# Patient Record
Sex: FEMALE | Race: BLACK OR AFRICAN AMERICAN | Employment: STUDENT | URBAN - METROPOLITAN AREA
[De-identification: names, ages, dates, MRNs, and addresses within clinical notes are randomized per-mention and may not be internally consistent; named-entity substitution may affect disease eponyms.]

---

## 2018-12-06 ENCOUNTER — MEDICAL CORRESPONDENCE (OUTPATIENT)
Dept: HEALTH INFORMATION MANAGEMENT | Facility: CLINIC | Age: 23
End: 2018-12-06

## 2018-12-10 ENCOUNTER — TRANSFERRED RECORDS (OUTPATIENT)
Dept: HEALTH INFORMATION MANAGEMENT | Facility: CLINIC | Age: 23
End: 2018-12-10

## 2018-12-18 ENCOUNTER — TELEPHONE (OUTPATIENT)
Dept: RHEUMATOLOGY | Facility: CLINIC | Age: 23
End: 2018-12-18

## 2019-01-03 NOTE — TELEPHONE ENCOUNTER
[facility] Call Center    Phone Message    May a detailed message be left on voicemail: yes    Reason for Call: Other: Milka [place], [phone] , Pts PCP is calling to follow up on a referral for the Pt, she has lupus and needs to be seen ASAP, Milka  states she has been waiting since 12/06 to get a response back, please yue the Primary care doctor at [phone], as well as the Pt to discuss      Action Taken: Message routed to:  [facility] ([facility]): Rheum

## 2019-01-07 NOTE — TELEPHONE ENCOUNTER
Spoke with patient who stated that she has not seen a Rheumatologist in the past and the only records needed are from [place] which have been received. Patient was offered an appointment on 2/15/2019 at 7:30 am with Dr. Hawkins and patient accepted. I let her know that I can place her on my wait list in case we get any cancellations. Patient stated that her schedule is limited due to being in school 3 days a week every other week.  Mirna Gregory CMA  1/7/2019 2:32 PM

## 2019-02-09 ENCOUNTER — NURSE TRIAGE (OUTPATIENT)
Dept: NURSING | Facility: CLINIC | Age: 24
End: 2019-02-09

## 2019-02-09 NOTE — TELEPHONE ENCOUNTER
Yvonne is taking medications that is causing bad side effects.  Today Yvonne has a rash on eye and was told due to lupus.  Yvonne is having confusion.  Today Yvonne is calling with dosing on prednisone.  FNA advised to phone pharmacist as no prescription for prednisone is listed in chart.

## 2019-02-14 ENCOUNTER — OFFICE VISIT (OUTPATIENT)
Dept: RHEUMATOLOGY | Facility: CLINIC | Age: 24
End: 2019-02-14
Attending: INTERNAL MEDICINE
Payer: COMMERCIAL

## 2019-02-14 VITALS
WEIGHT: 136.4 LBS | OXYGEN SATURATION: 96 % | SYSTOLIC BLOOD PRESSURE: 94 MMHG | HEIGHT: 66 IN | DIASTOLIC BLOOD PRESSURE: 66 MMHG | BODY MASS INDEX: 21.92 KG/M2 | HEART RATE: 99 BPM | TEMPERATURE: 98.2 F

## 2019-02-14 DIAGNOSIS — M32.19 SYSTEMIC LUPUS ERYTHEMATOSUS WITH OTHER ORGAN INVOLVEMENT, UNSPECIFIED SLE TYPE (H): ICD-10-CM

## 2019-02-14 DIAGNOSIS — M32.19 SYSTEMIC LUPUS ERYTHEMATOSUS WITH OTHER ORGAN INVOLVEMENT, UNSPECIFIED SLE TYPE (H): Primary | ICD-10-CM

## 2019-02-14 LAB
ALBUMIN SERPL-MCNC: 3.4 G/DL (ref 3.4–5)
ALP SERPL-CCNC: 71 U/L (ref 40–150)
ALT SERPL W P-5'-P-CCNC: 16 U/L (ref 0–50)
ANION GAP SERPL CALCULATED.3IONS-SCNC: 6 MMOL/L (ref 3–14)
AST SERPL W P-5'-P-CCNC: 20 U/L (ref 0–45)
BASOPHILS # BLD AUTO: 0 10E9/L (ref 0–0.2)
BASOPHILS NFR BLD AUTO: 0 %
BILIRUB SERPL-MCNC: 0.3 MG/DL (ref 0.2–1.3)
BUN SERPL-MCNC: 6 MG/DL (ref 7–30)
BURR CELLS BLD QL SMEAR: ABNORMAL
CALCIUM SERPL-MCNC: 8.3 MG/DL (ref 8.5–10.1)
CHLORIDE SERPL-SCNC: 104 MMOL/L (ref 94–109)
CO2 SERPL-SCNC: 28 MMOL/L (ref 20–32)
CREAT SERPL-MCNC: 0.42 MG/DL (ref 0.52–1.04)
DIFFERENTIAL METHOD BLD: ABNORMAL
EOSINOPHIL # BLD AUTO: 0 10E9/L (ref 0–0.7)
EOSINOPHIL NFR BLD AUTO: 0 %
ERYTHROCYTE [DISTWIDTH] IN BLOOD BY AUTOMATED COUNT: 14.6 % (ref 10–15)
GFR SERPL CREATININE-BSD FRML MDRD: >90 ML/MIN/{1.73_M2}
GLUCOSE SERPL-MCNC: 84 MG/DL (ref 70–99)
HCT VFR BLD AUTO: 36.2 % (ref 35–47)
HGB BLD-MCNC: 11.5 G/DL (ref 11.7–15.7)
IMM GRANULOCYTES # BLD: 0 10E9/L (ref 0–0.4)
IMM GRANULOCYTES NFR BLD: 0 %
LYMPHOCYTES # BLD AUTO: 0.6 10E9/L (ref 0.8–5.3)
LYMPHOCYTES NFR BLD AUTO: 28.7 %
MCH RBC QN AUTO: 27.9 PG (ref 26.5–33)
MCHC RBC AUTO-ENTMCNC: 31.8 G/DL (ref 31.5–36.5)
MCV RBC AUTO: 88 FL (ref 78–100)
MONOCYTES # BLD AUTO: 0.1 10E9/L (ref 0–1.3)
MONOCYTES NFR BLD AUTO: 6 %
NEUTROPHILS # BLD AUTO: 1.4 10E9/L (ref 1.6–8.3)
NEUTROPHILS NFR BLD AUTO: 65.3 %
NRBC # BLD AUTO: 0 10*3/UL
NRBC BLD AUTO-RTO: 0 /100
OVALOCYTES BLD QL SMEAR: SLIGHT
PLATELET # BLD AUTO: 127 10E9/L (ref 150–450)
PLATELET # BLD EST: ABNORMAL 10*3/UL
POIKILOCYTOSIS BLD QL SMEAR: ABNORMAL
POTASSIUM SERPL-SCNC: 2.8 MMOL/L (ref 3.4–5.3)
PROT SERPL-MCNC: 7.8 G/DL (ref 6.8–8.8)
RBC # BLD AUTO: 4.12 10E12/L (ref 3.8–5.2)
SODIUM SERPL-SCNC: 139 MMOL/L (ref 133–144)
WBC # BLD AUTO: 2.2 10E9/L (ref 4–11)

## 2019-02-14 PROCEDURE — 82306 VITAMIN D 25 HYDROXY: CPT | Performed by: INTERNAL MEDICINE

## 2019-02-14 PROCEDURE — 86147 CARDIOLIPIN ANTIBODY EA IG: CPT | Performed by: INTERNAL MEDICINE

## 2019-02-14 PROCEDURE — 86160 COMPLEMENT ANTIGEN: CPT | Performed by: INTERNAL MEDICINE

## 2019-02-14 PROCEDURE — 85613 RUSSELL VIPER VENOM DILUTED: CPT | Performed by: INTERNAL MEDICINE

## 2019-02-14 PROCEDURE — 82550 ASSAY OF CK (CPK): CPT | Performed by: INTERNAL MEDICINE

## 2019-02-14 PROCEDURE — 00000401 ZZHCL STATISTIC THROMBIN TIME NC: Performed by: INTERNAL MEDICINE

## 2019-02-14 PROCEDURE — 36415 COLL VENOUS BLD VENIPUNCTURE: CPT | Performed by: INTERNAL MEDICINE

## 2019-02-14 PROCEDURE — 87389 HIV-1 AG W/HIV-1&-2 AB AG IA: CPT | Performed by: INTERNAL MEDICINE

## 2019-02-14 PROCEDURE — G0463 HOSPITAL OUTPT CLINIC VISIT: HCPCS | Mod: ZF

## 2019-02-14 PROCEDURE — 87340 HEPATITIS B SURFACE AG IA: CPT | Performed by: INTERNAL MEDICINE

## 2019-02-14 PROCEDURE — 00000167 ZZHCL STATISTIC INR NC: Performed by: INTERNAL MEDICINE

## 2019-02-14 PROCEDURE — 86146 BETA-2 GLYCOPROTEIN ANTIBODY: CPT | Performed by: INTERNAL MEDICINE

## 2019-02-14 PROCEDURE — 85730 THROMBOPLASTIN TIME PARTIAL: CPT | Performed by: INTERNAL MEDICINE

## 2019-02-14 PROCEDURE — 85025 COMPLETE CBC W/AUTO DIFF WBC: CPT | Performed by: INTERNAL MEDICINE

## 2019-02-14 PROCEDURE — 86480 TB TEST CELL IMMUN MEASURE: CPT | Performed by: INTERNAL MEDICINE

## 2019-02-14 PROCEDURE — 86803 HEPATITIS C AB TEST: CPT | Performed by: INTERNAL MEDICINE

## 2019-02-14 PROCEDURE — 80053 COMPREHEN METABOLIC PANEL: CPT | Performed by: INTERNAL MEDICINE

## 2019-02-14 PROCEDURE — 82657 ENZYME CELL ACTIVITY: CPT | Performed by: INTERNAL MEDICINE

## 2019-02-14 RX ORDER — POTASSIUM CHLORIDE 1500 MG/1
40 TABLET, EXTENDED RELEASE ORAL DAILY
COMMUNITY
Start: 2019-01-26 | End: 2019-02-20

## 2019-02-14 RX ORDER — HYDROXYCHLOROQUINE SULFATE 200 MG/1
300 TABLET, FILM COATED ORAL DAILY
COMMUNITY
End: 2019-02-20

## 2019-02-14 RX ORDER — PREDNISONE 10 MG/1
10 TABLET ORAL 3 TIMES DAILY
COMMUNITY
End: 2019-02-20

## 2019-02-14 RX ORDER — LEVOTHYROXINE SODIUM 75 UG/1
75 TABLET ORAL DAILY
COMMUNITY
Start: 2019-01-26 | End: 2019-02-20

## 2019-02-14 ASSESSMENT — PAIN SCALES - GENERAL: PAINLEVEL: NO PAIN (0)

## 2019-02-14 ASSESSMENT — MIFFLIN-ST. JEOR: SCORE: 1380.46

## 2019-02-14 NOTE — PROGRESS NOTES
Rheumatology Clinic New Patient Visit - Attending    Reason for evaluation: SLE    SUBJECTIVE:  The patient is a 22 yo woman with a hx of Graves disease s/p radioactive iodine ablation in 2013, post-ablation hypothyroidism, who was recently diagnosed with SLE in 11/2018 in a primary care setting presents to establish rheumatology care. She initially presented with raynaud's, rash over face and legs, nasal and oral ulcers, weight loss of 30 lbs, and severe fatigue. Was found to have Leukopenia, thrombocytopenia (130), sed rate 45, SHANDA 1:320, Sm>8, SSA>8, RNP>8, DS DNA 17, low C3 C4. She was started on hydroxychloroquine 300/day, prednisone 30 mg/day, and subsequently - verapamil for Raynauds. About 2 weeks after beginning medications, she developed lapses in memory - could not remember whole days, or an e-mail she had sent. Found herself driving to [place] for no clear reason, until she ran out of gas. She became concerned that these symptoms were due to new medications and stopped taking them. She started feeling more lucid soon after.    Patient denies any fevers, chills, vision changes, seizures, focal weakness or numbness.  Denies any arthralgias, morning stiffness, myalgias,  Alopecia, vitiligo, ear fullness or drainage.   No cough or dyspnea, pleuricy, no abdominal pain, nausea, diarrhea, no hematuria, no history of blood clots.       Additional pertinent labs:    RF CCP neg  11/15 hypothyroid TSH 15  Marked hypokalemia  EBV IgG high, IgM neg Parvo B19 IgG high    Family hx negative for autoimmune disease, positive for diabetes and hypertension.      REVIEW OF SYMPTOMS:  A 10 point ROS was performed with pertinent findings listed above.      HISTORY REVIEW:  No past medical history on file.  No past surgical history on file.  No family history on file.  Social History     Socioeconomic History     Marital status: Single     Spouse name: Not on file     Number of children: Not on file     Years of education:  Not on file     Highest education level: Not on file   Social Needs     Financial resource strain: Not on file     Food insecurity - worry: Not on file     Food insecurity - inability: Not on file     Transportation needs - medical: Not on file     Transportation needs - non-medical: Not on file   Occupational History     Not on file   Tobacco Use     Smoking status: Never Smoker     Smokeless tobacco: Never Used   Substance and Sexual Activity     Alcohol use: Not on file     Drug use: Not on file     Sexual activity: Not on file   Other Topics Concern     Not on file   Social History Narrative     Not on file     There is no problem list on file for this patient.    Not on File    I reviewed the Current Medications:  Current Outpatient Medications   Medication Sig Dispense Refill     hydroxychloroquine (PLAQUENIL) 200 MG tablet Take 300 mg by mouth daily        levothyroxine (SYNTHROID/LEVOTHROID) 75 MCG tablet Take 75 mcg by mouth daily        potassium chloride ER (K-DUR/KLOR-CON M) 20 MEQ CR tablet Take 40 mEq by mouth daily        predniSONE (DELTASONE) 10 MG tablet Take 10 mg by mouth 3 times daily .           I reviewed the following labs:  Last CBC:  No results found for: WBC  No results found for: RBC  No results found for: HGB  No results found for: HCT  No components found for: MCT  No results found for: MCV  No results found for: MCH  No results found for: MCHC  No results found for: RDW  No results found for: PLT    Last Basic Metabolic Panel:  No results found for: NA   No results found for: POTASSIUM  No results found for: CHLORIDE  No results found for: LASHA  No results found for: CO2  No results found for: BUN  No results found for: CR  No results found for: GLC    No results found for: CKTOTAL  No results found for: TSH  No results found for: URIC  No results found for: CRP    Liver Function Studies - No results for input(s): PROTTOTAL, ALBUMIN, BILITOTAL, ALKPHOS, AST, ALT, BILIDIRECT in the last  "88171 hours.    UA RESULTS:  No results for input(s): COLOR, APPEARANCE, URINEGLC, URINEBILI, URINEKETONE, SG, UBLD, URINEPH, PROTEIN, UROBILINOGEN, NITRITE, LEUKEST, RBCU, WBCU in the last 76834 hours.    Autoimmunity labs:  No results found for: RHF  No results found for: CCPIGG    No results found for: ANCA  No results found for: [id number]  No results found for: V1QBIHJ  No results found for: VANNA  No results found for: DNA  No results found for: RNPIGG, SMIGG, SSAIGG, SSBIGG, SCLIGG        I personally reviewed and independently visualized the following images:  1/21/19  CT head without contrast.    COMPARISON:  None available     FINDINGS:  The ventricles are in proportion to the cortical sulci consistent with patient`s stated age. The gray-white matter junction is distinct. Negative for acute intracranial hemorrhage, extra-axial fluid collection or midline shift. The basal cisterns are intact.     There is mild mucosal thickening of the ethmoid sinuses. The remainder of the visualized paranasal sinuses are clear. The bony calvarium is intact.     IMPRESSION:  Unremarkable noncontrast head CT without acute intracranial abnormality.      Vitals: Blood pressure 94/66, pulse 99, temperature 98.2  F (36.8  C), temperature source Oral, height 1.676 m (5' 6\"), weight 61.9 kg (136 lb 6.4 oz), SpO2 96 %.      PHYSICAL EXAM  Constitutional: [id number] cooperative  Eyes: nl EOM, PERRLA, vision, conjunctiva, sclera  ENT: nl external ears, nose, hearing, lips, teeth, gums, throat  No mucous membrane lesions, normal saliva pool  Neck: no mass or thyroid enlargement  Resp: lungs clear to auscultation, nl to palpation  CV: RRR, no murmurs, rubs or gallops, no edema  GI: no ABD mass or tenderness  : not tested  Lymph: no cervical, supraclavicular, inguinal or epitrochlear nodes  MS: All TMJ, neck, shoulder, elbow, wrist, MCP/PIP/DIP, spine, hip, knee, ankle, and foot MTP/IP joints were examined and  found normal. No active " synovitis or deformity. Full ROM.  Normal  strength  Skin: + faint butterfly rash and discoid-appearing rash over the right eye. no nail pitting, alopecia, nodules or lesions  Neuro: nl cranial nerves, strength, sensation.   Psych: nl judgement, orientation, memory, affect.      ASSESSMENT and PLAN:  Yvonne has classic symptoms and serologif findings of SLE. Clinically her disease is very mild, however constitutional symptoms (weight loss) and markedly abnormal serologies (hypocomplementemia, thrombocytopenia) suggest a higher risk of severe SLE. It is not clear whether her memory lapses and impulsive behavior are related to SLE vs meds. She feels it's meds and feels better now after stopping. I suspect prednisone is the most likely culprit, hence will restart plaquenil, levothyroxine and potassium supplement, but not steroids for now.    We will get labwork today to assess the status of her cytopenias, hypokalemia, hypocomplementemia, and check antiphospholipid abs. If labs persistently abnormal, I would start azathioprine and consider adding a lower dose of prednisone temporarily.    We also discussed sunscreen, diet, and a dermatology referral for management of facial rashes.  I gave Yvonne a note for school; if neurologic symptoms recur, would consider MRI of the brain.    RTC: in 4 weeks.      I discussed the findings and recommendations with the patient.  I communicated the assessment and plan to the referring physician.  Time spent: 90 minutes, face time with patient: 45 minutes.      Yue Wiley MD, MSc  Rheumatology Attending Physician  [facility] [phone]

## 2019-02-14 NOTE — LETTER
To whom it may concern:    Yvonne Barron is under my care for systemic lupus erythematosus (SLE) that was diagnosed in 11/2018. This disease is highly unpredictable and can affect the brain. Common brain symptoms include extreme fatigue, forgetfulness, headaches and psychosis. These symptoms are particularly difficult to manage and predict early in the disease course. It is very brave of Yvonne to stay in school during this difficult time. I request that academic accomodations be made to support her in any way possible.     Please don't hesitate to contact me with any questions.    Sincerely,        Yue Wiley MD, MSc  Rheumatology Attending Physician  Pgr [phone]

## 2019-02-14 NOTE — PATIENT INSTRUCTIONS
- Restart hydroxychloroquine 1.5 tabs (300 mg) every day  - Restart levothyroxine 75 mg/day  - Labs today. Depending on lab results we will discuss starting an immunosuppressive medicine called azathioprine. Please read about it and ask any questions.  - We will also discuss continuing a potassium supplement and starting a vitamin D supplement if these tests come back low.

## 2019-02-14 NOTE — NURSING NOTE
"Chief Complaint   Patient presents with     Consult     positive SHANDA     BP 94/66   Pulse 99   Temp 98.2  F (36.8  C) (Oral)   Ht 1.676 m (5' 6\")   Wt 61.9 kg (136 lb 6.4 oz)   SpO2 96%   BMI 22.02 kg/m    Patti Miguel MA    "

## 2019-02-14 NOTE — LETTER
2/14/2019     RE: Yvonne Barron  [address]  [facility] [address]     Dear Colleague,    Thank you for referring your patient, Yvonne Barron, to the [facility] RHEUMATOLOGY at [facility]. Please see a copy of my visit note below.    Rheumatology Clinic New Patient Visit - Attending    Reason for evaluation: SLE    SUBJECTIVE:  The patient is a 22 yo woman with a hx of Graves disease s/p radioactive iodine ablation in 2013, post-ablation hypothyroidism, who was recently diagnosed with SLE in 11/2018 in a primary care setting presents to establish rheumatology care. She initially presented with raynaud's, rash over face and legs, nasal and oral ulcers, weight loss of 30 lbs, and severe fatigue. Was found to have Leukopenia, thrombocytopenia (130), sed rate 45, SHANDA 1:320, Sm>8, SSA>8, RNP>8, DS DNA 17, low C3 C4. She was started on hydroxychloroquine 300/day, prednisone 30 mg/day, and subsequently - verapamil for Raynauds. About 2 weeks after beginning medications, she developed lapses in memory - could not remember whole days, or an e-mail she had sent. Found herself driving to [place] for no clear reason, until she ran out of gas. She became concerned that these symptoms were due to new medications and stopped taking them. She started feeling more lucid soon after.    Patient denies any fevers, chills, vision changes, seizures, focal weakness or numbness.  Denies any arthralgias, morning stiffness, myalgias,  Alopecia, vitiligo, ear fullness or drainage.   No cough or dyspnea, pleuricy, no abdominal pain, nausea, diarrhea, no hematuria, no history of blood clots.       Additional pertinent labs:    RF CCP neg  11/15 hypothyroid TSH 15  Marked hypokalemia  EBV IgG high, IgM neg Parvo B19 IgG high    Family hx negative for autoimmune disease, positive for diabetes and hypertension.      REVIEW OF SYMPTOMS:  A 10 point ROS was performed with pertinent findings listed  above.      HISTORY REVIEW:  No past medical history on file.  No past surgical history on file.  No family history on file.  Social History     Socioeconomic History     Marital status: Single     Spouse name: Not on file     Number of children: Not on file     Years of education: Not on file     Highest education level: Not on file   Social Needs     Financial resource strain: Not on file     Food insecurity - worry: Not on file     Food insecurity - inability: Not on file     Transportation needs - medical: Not on file     Transportation needs - non-medical: Not on file   Occupational History     Not on file   Tobacco Use     Smoking status: Never Smoker     Smokeless tobacco: Never Used   Substance and Sexual Activity     Alcohol use: Not on file     Drug use: Not on file     Sexual activity: Not on file   Other Topics Concern     Not on file   Social History Narrative     Not on file     There is no problem list on file for this patient.    Not on File    I reviewed the Current Medications:  Current Outpatient Medications   Medication Sig Dispense Refill     hydroxychloroquine (PLAQUENIL) 200 MG tablet Take 300 mg by mouth daily        levothyroxine (SYNTHROID/LEVOTHROID) 75 MCG tablet Take 75 mcg by mouth daily        potassium chloride ER (K-DUR/KLOR-CON M) 20 MEQ CR tablet Take 40 mEq by mouth daily        predniSONE (DELTASONE) 10 MG tablet Take 10 mg by mouth 3 times daily .           I reviewed the following labs:  Last CBC:  No results found for: WBC  No results found for: RBC  No results found for: HGB  No results found for: HCT  No components found for: MCT  No results found for: MCV  No results found for: MCH  No results found for: MCHC  No results found for: RDW  No results found for: PLT    Last Basic Metabolic Panel:  No results found for: NA   No results found for: POTASSIUM  No results found for: CHLORIDE  No results found for: LASHA  No results found for: CO2  No results found for: BUN  No results  "found for: CR  No results found for: GLC    No results found for: CKTOTAL  No results found for: TSH  No results found for: URIC  No results found for: CRP    Liver Function Studies - No results for input(s): PROTTOTAL, ALBUMIN, BILITOTAL, ALKPHOS, AST, ALT, BILIDIRECT in the last 79168 hours.    UA RESULTS:  No results for input(s): COLOR, APPEARANCE, URINEGLC, URINEBILI, URINEKETONE, SG, UBLD, URINEPH, PROTEIN, UROBILINOGEN, NITRITE, LEUKEST, RBCU, WBCU in the last 32653 hours.    Autoimmunity labs:  No results found for: RHF  No results found for: CCPIGG    No results found for: ANCA  No results found for: [id number]  No results found for: G9KCALO  No results found for: VANNA  No results found for: DNA  No results found for: RNPIGG, SMIGG, SSAIGG, SSBIGG, SCLIGG        I personally reviewed and independently visualized the following images:  1/21/19  CT head without contrast.    COMPARISON:  None available     FINDINGS:  The ventricles are in proportion to the cortical sulci consistent with patient`s stated age. The gray-white matter junction is distinct. Negative for acute intracranial hemorrhage, extra-axial fluid collection or midline shift. The basal cisterns are intact.     There is mild mucosal thickening of the ethmoid sinuses. The remainder of the visualized paranasal sinuses are clear. The bony calvarium is intact.     IMPRESSION:  Unremarkable noncontrast head CT without acute intracranial abnormality.      Vitals: Blood pressure 94/66, pulse 99, temperature 98.2  F (36.8  C), temperature source Oral, height 1.676 m (5' 6\"), weight 61.9 kg (136 lb 6.4 oz), SpO2 96 %.      PHYSICAL EXAM  Constitutional: WD-WN-WG cooperative  Eyes: nl EOM, PERRLA, vision, conjunctiva, sclera  ENT: nl external ears, nose, hearing, lips, teeth, gums, throat  No mucous membrane lesions, normal saliva pool  Neck: no mass or thyroid enlargement  Resp: lungs clear to auscultation, nl to palpation  CV: RRR, no murmurs, rubs or " gallops, no edema  GI: no ABD mass or tenderness  : not tested  Lymph: no cervical, supraclavicular, inguinal or epitrochlear nodes  MS: All TMJ, neck, shoulder, elbow, wrist, MCP/PIP/DIP, spine, hip, knee, ankle, and foot MTP/IP joints were examined and  found normal. No active synovitis or deformity. Full ROM.  Normal  strength  Skin: + faint butterfly rash and discoid-appearing rash over the right eye. no nail pitting, alopecia, nodules or lesions  Neuro: nl cranial nerves, strength, sensation.   Psych: nl judgement, orientation, memory, affect.      ASSESSMENT and PLAN:  Yvonne has classic symptoms and serologif findings of SLE. Clinically her disease is very mild, however constitutional symptoms (weight loss) and markedly abnormal serologies (hypocomplementemia, thrombocytopenia) suggest a higher risk of severe SLE. It is not clear whether her memory lapses and impulsive behavior are related to SLE vs meds. She feels it's meds and feels better now after stopping. I suspect prednisone is the most likely culprit, hence will restart plaquenil, levothyroxine and potassium supplement, but not steroids for now.    We will get labwork today to assess the status of her cytopenias, hypokalemia, hypocomplementemia, and check antiphospholipid abs. If labs persistently abnormal, I would start azathioprine and consider adding a lower dose of prednisone temporarily.    We also discussed sunscreen, diet, and a dermatology referral for management of facial rashes.  I gave Yvonne a note for school; if neurologic symptoms recur, would consider MRI of the brain.    RTC: in 4 weeks.    I discussed the findings and recommendations with the patient.  I communicated the assessment and plan to the referring physician.  Time spent: 90 minutes, face time with patient: 45 minutes.      Yue Wiley MD, MSc  Rheumatology Attending Physician  pgr [phone]

## 2019-02-14 NOTE — LETTER
2/14/2019    RE: Yvonne SERRANO Anderson  [address]  [facility] [address]     Rheumatology Clinic New Patient Visit - Attending    Reason for evaluation: SLE    SUBJECTIVE:  The patient is a 24 yo woman with a hx of Graves disease s/p radioactive iodine ablation in 2013, post-ablation hypothyroidism, who was recently diagnosed with SLE in 11/2018 in a primary care setting presents to establish rheumatology care. She initially presented with raynaud's, rash over face and legs, nasal and oral ulcers, weight loss of 30 lbs, and severe fatigue. Was found to have Leukopenia, thrombocytopenia (130), sed rate 45, SHANDA 1:320, Sm>8, SSA>8, RNP>8, DS DNA 17, low C3 C4. She was started on hydroxychloroquine 300/day, prednisone 30 mg/day, and subsequently - verapamil for Raynauds. About 2 weeks after beginning medications, she developed lapses in memory - could not remember whole days, or an e-mail she had sent. Found herself driving to [place] for no clear reason, until she ran out of gas. She became concerned that these symptoms were due to new medications and stopped taking them. She started feeling more lucid soon after.    Patient denies any fevers, chills, vision changes, seizures, focal weakness or numbness.  Denies any arthralgias, morning stiffness, myalgias,  Alopecia, vitiligo, ear fullness or drainage.   No cough or dyspnea, pleuricy, no abdominal pain, nausea, diarrhea, no hematuria, no history of blood clots.       Additional pertinent labs:    RF CCP neg  11/15 hypothyroid TSH 15  Marked hypokalemia  EBV IgG high, IgM neg Parvo B19 IgG high    Family hx negative for autoimmune disease, positive for diabetes and hypertension.      REVIEW OF SYMPTOMS:  A 10 point ROS was performed with pertinent findings listed above.      HISTORY REVIEW:  No past medical history on file.  No past surgical history on file.  No family history on file.  Social History     Socioeconomic History     Marital status: Single     Spouse  name: Not on file     Number of children: Not on file     Years of education: Not on file     Highest education level: Not on file   Social Needs     Financial resource strain: Not on file     Food insecurity - worry: Not on file     Food insecurity - inability: Not on file     Transportation needs - medical: Not on file     Transportation needs - non-medical: Not on file   Occupational History     Not on file   Tobacco Use     Smoking status: Never Smoker     Smokeless tobacco: Never Used   Substance and Sexual Activity     Alcohol use: Not on file     Drug use: Not on file     Sexual activity: Not on file   Other Topics Concern     Not on file   Social History Narrative     Not on file     There is no problem list on file for this patient.    Not on File    I reviewed the Current Medications:  Current Outpatient Medications   Medication Sig Dispense Refill     hydroxychloroquine (PLAQUENIL) 200 MG tablet Take 300 mg by mouth daily        levothyroxine (SYNTHROID/LEVOTHROID) 75 MCG tablet Take 75 mcg by mouth daily        potassium chloride ER (K-DUR/KLOR-CON M) 20 MEQ CR tablet Take 40 mEq by mouth daily        predniSONE (DELTASONE) 10 MG tablet Take 10 mg by mouth 3 times daily .           I reviewed the following labs:  Last CBC:  No results found for: WBC  No results found for: RBC  No results found for: HGB  No results found for: HCT  No components found for: MCT  No results found for: MCV  No results found for: MCH  No results found for: MCHC  No results found for: RDW  No results found for: PLT    Last Basic Metabolic Panel:  No results found for: NA   No results found for: POTASSIUM  No results found for: CHLORIDE  No results found for: LASHA  No results found for: CO2  No results found for: BUN  No results found for: CR  No results found for: GLC    No results found for: CKTOTAL  No results found for: TSH  No results found for: URIC  No results found for: CRP    Liver Function Studies - No results for  "input(s): PROTTOTAL, ALBUMIN, BILITOTAL, ALKPHOS, AST, ALT, BILIDIRECT in the last 90886 hours.    UA RESULTS:  No results for input(s): COLOR, APPEARANCE, URINEGLC, URINEBILI, URINEKETONE, SG, UBLD, URINEPH, PROTEIN, UROBILINOGEN, NITRITE, LEUKEST, RBCU, WBCU in the last 99452 hours.    Autoimmunity labs:  No results found for: RHF  No results found for: CCPIGG    No results found for: ANCA  No results found for: X3ODOEA  No results found for: W9ZCAQD  No results found for: VANNA  No results found for: DNA  No results found for: RNPIGG, SMIGG, SSAIGG, SSBIGG, SCLIGG        I personally reviewed and independently visualized the following images:  1/21/19  CT head without contrast.    COMPARISON:  None available     FINDINGS:  The ventricles are in proportion to the cortical sulci consistent with patient`s stated age. The gray-white matter junction is distinct. Negative for acute intracranial hemorrhage, extra-axial fluid collection or midline shift. The basal cisterns are intact.     There is mild mucosal thickening of the ethmoid sinuses. The remainder of the visualized paranasal sinuses are clear. The bony calvarium is intact.     IMPRESSION:  Unremarkable noncontrast head CT without acute intracranial abnormality.      Vitals: Blood pressure 94/66, pulse 99, temperature 98.2  F (36.8  C), temperature source Oral, height 1.676 m (5' 6\"), weight 61.9 kg (136 lb 6.4 oz), SpO2 96 %.      PHYSICAL EXAM  Constitutional: WD-WN-WG cooperative  Eyes: nl EOM, PERRLA, vision, conjunctiva, sclera  ENT: nl external ears, nose, hearing, lips, teeth, gums, throat  No mucous membrane lesions, normal saliva pool  Neck: no mass or thyroid enlargement  Resp: lungs clear to auscultation, nl to palpation  CV: RRR, no murmurs, rubs or gallops, no edema  GI: no ABD mass or tenderness  : not tested  Lymph: no cervical, supraclavicular, inguinal or epitrochlear nodes  MS: All TMJ, neck, shoulder, elbow, wrist, MCP/PIP/DIP, spine, hip, " knee, ankle, and foot MTP/IP joints were examined and  found normal. No active synovitis or deformity. Full ROM.  Normal  strength  Skin: + faint butterfly rash and discoid-appearing rash over the right eye. no nail pitting, alopecia, nodules or lesions  Neuro: nl cranial nerves, strength, sensation.   Psych: nl judgement, orientation, memory, affect.      ASSESSMENT and PLAN:  Yvonne has classic symptoms and serologif findings of SLE. Clinically her disease is very mild, however constitutional symptoms (weight loss) and markedly abnormal serologies (hypocomplementemia, thrombocytopenia) suggest a higher risk of severe SLE. It is not clear whether her memory lapses and impulsive behavior are related to SLE vs meds. She feels it's meds and feels better now after stopping. I suspect prednisone is the most likely culprit, hence will restart plaquenil, levothyroxine and potassium supplement, but not steroids for now.    We will get labwork today to assess the status of her cytopenias, hypokalemia, hypocomplementemia, and check antiphospholipid abs. If labs persistently abnormal, I would start azathioprine and consider adding a lower dose of prednisone temporarily.    We also discussed sunscreen, diet, and a dermatology referral for management of facial rashes.  I gave Yvonne a note for school; if neurologic symptoms recur, would consider MRI of the brain.    RTC: in 4 weeks.      I discussed the findings and recommendations with the patient.  I communicated the assessment and plan to the referring physician.  Time spent: 90 minutes, face time with patient: 45 minutes.      Yue Wiley MD, MSc  Rheumatology Attending Physician  pgr [phone]

## 2019-02-15 ENCOUNTER — TELEPHONE (OUTPATIENT)
Dept: RHEUMATOLOGY | Facility: CLINIC | Age: 24
End: 2019-02-15

## 2019-02-15 DIAGNOSIS — M32.9 SYSTEMIC LUPUS ERYTHEMATOSUS, UNSPECIFIED SLE TYPE, UNSPECIFIED ORGAN INVOLVEMENT STATUS (H): Primary | ICD-10-CM

## 2019-02-15 DIAGNOSIS — M32.19 SYSTEMIC LUPUS ERYTHEMATOSUS WITH OTHER ORGAN INVOLVEMENT, UNSPECIFIED SLE TYPE (H): ICD-10-CM

## 2019-02-15 LAB
B2 GLYCOPROT1 IGG SERPL IA-ACNC: 1.4 U/ML
B2 GLYCOPROT1 IGM SERPL IA-ACNC: <0.9 U/ML
C3 SERPL-MCNC: 29 MG/DL (ref 76–169)
C4 SERPL-MCNC: 4 MG/DL (ref 15–50)
CARDIOLIPIN ANTIBODY IGG: <1.6 GPL-U/ML (ref 0–19.9)
CARDIOLIPIN ANTIBODY IGM: 0.2 MPL-U/ML (ref 0–19.9)
CK SERPL-CCNC: 30 U/L (ref 30–225)
DEPRECATED CALCIDIOL+CALCIFEROL SERPL-MC: 22 UG/L (ref 20–75)
HBV SURFACE AG SERPL QL IA: NONREACTIVE
HCV AB SERPL QL IA: NONREACTIVE
HIV 1+2 AB+HIV1 P24 AG SERPL QL IA: NONREACTIVE

## 2019-02-15 NOTE — TELEPHONE ENCOUNTER
Called and spoke with pt, she will resume potassium at last prescribed dose, will connect with PCP for further refills.    Manisha Michael RN  Rheumatology Clinic

## 2019-02-15 NOTE — TELEPHONE ENCOUNTER
Called and spoke with pt, she will see me on 2/28 at 1 pm in clinic for a follow up nurse visit.  She will have labs the same day.  She has requested medication go to [facility].      Discussed Azathioprine, will start with 50 mg or 1 tab daily x 7 days, then will increase to 50 mg BID, 1 tab twice a day.  If she has any side effects she will call me, such as diarrhea, or n/v.    She will restart prednisone at 20 mg daily in the am.  She will call if there is any confusion with taking.      Pt had all questions answered.    Manisha Michael RN  Rheumatology Clinic

## 2019-02-15 NOTE — TELEPHONE ENCOUNTER
Social Work Intervention  [facility]    Data/Intervention:    Patient Name:  Yvonne Barron  /Age:  1995 (23 year old)    Visit Type: telephone  Referral Source: [facility] Michael in Rheumatology Clinic   Reason for Referral:  Insurance questions     Collaborated With:    - Patient     Patient Concerns/Issues:   Recieved request from GI[facility]PPE Michael to meet with pt as she had insurance questions. Attempted to meet with pt in clinic, but pt had already left by the time SW got to lobby. Called pt to follow up; introduced self and role of SW, reason for call. Pt had general questions about how her insurance worked. Pt reported that she has a student BCBS plan and also was recently approved for MA. Could not locate pt in [facility] as having MA, but encouraged pt to call billing office with updated insurance info once she has it. Also explained that if she ever gets a bill and has questions or thinks it's incorrect, she can always call the billing number located on the bill to follow-up. Pt expressed understanding and thanked SW for the call. Provided SW contact info should other needs arise.    Intervention/Education/Resources Provided:  - Education on insurance    Assessment/Plan:  Pt was pleasant and receptive to SW. No SW needs identified, pt more so interested in learning how her insurance works.     Provided patient/family with contact information and availability.    MOISES Bernardo, [facility]  Clinical   [facility]   Ph. [phone]  [email or web address]     NO LETTER

## 2019-02-15 NOTE — TELEPHONE ENCOUNTER
----- Message from Yue Wiley MD sent at 2/14/2019  7:53 PM CST -----  Please let Yvonne know to resume potassium, hers is still very low.

## 2019-02-15 NOTE — TELEPHONE ENCOUNTER
----- Message from Yue Wiley MD sent at 2/15/2019 11:02 AM CST -----  Hey,    Not sure if you were able to meet Yvonne yesterday, but she needs close vigilant care. I asked her to set up mychart yesterday and she did not.    Her labs are now back, complement levels very low. I want her to:   - resume her potassium supplement  - resume prednisone at a lower dose (20 mg/day, which hopefully won't cause her confusion)  - start azathioprine 50 mg daily for 1 week, then 50 mg BID.  - return for nurse visit with med review and azathioprine safety labs in 2 weeks (CBC, ALT, AST)

## 2019-02-17 LAB
GAMMA INTERFERON BACKGROUND BLD IA-ACNC: 0.07 IU/ML
M TB IFN-G BLD-IMP: NEGATIVE
M TB IFN-G CD4+ BCKGRND COR BLD-ACNC: 2.65 IU/ML
MITOGEN IGNF BCKGRD COR BLD-ACNC: 0 IU/ML
MITOGEN IGNF BCKGRD COR BLD-ACNC: 0.01 IU/ML

## 2019-02-18 LAB
LA PPP-IMP: NEGATIVE
TPMT BLD-CCNC: 25.1 U/ML (ref 24–44)

## 2019-02-20 ENCOUNTER — HOSPITAL ENCOUNTER (EMERGENCY)
Facility: CLINIC | Age: 24
Discharge: HOME | End: 2019-02-20
Attending: EMERGENCY MEDICINE | Admitting: EMERGENCY MEDICINE
Payer: COMMERCIAL

## 2019-02-20 VITALS
OXYGEN SATURATION: 100 % | SYSTOLIC BLOOD PRESSURE: 104 MMHG | TEMPERATURE: 100.1 F | RESPIRATION RATE: 18 BRPM | WEIGHT: 131 LBS | DIASTOLIC BLOOD PRESSURE: 57 MMHG | HEART RATE: 109 BPM | BODY MASS INDEX: 21.14 KG/M2

## 2019-02-20 DIAGNOSIS — L93.0 LUPUS ERYTHEMATOSUS, UNSPECIFIED FORM: ICD-10-CM

## 2019-02-20 PROCEDURE — 99283 EMERGENCY DEPT VISIT LOW MDM: CPT | Performed by: EMERGENCY MEDICINE

## 2019-02-20 PROCEDURE — 99284 EMERGENCY DEPT VISIT MOD MDM: CPT | Mod: Z6 | Performed by: EMERGENCY MEDICINE

## 2019-02-20 RX ORDER — AZATHIOPRINE 50 MG/1
TABLET ORAL
Qty: 30 TABLET | Refills: 0 | Status: SHIPPED | OUTPATIENT
Start: 2019-02-20 | End: 2020-01-23

## 2019-02-20 RX ORDER — PREDNISONE 20 MG/1
TABLET ORAL
Qty: 30 TABLET | Refills: 1 | Status: SHIPPED | OUTPATIENT
Start: 2019-02-20 | End: 2019-02-27

## 2019-02-20 RX ORDER — POTASSIUM CHLORIDE 1500 MG/1
20 TABLET, EXTENDED RELEASE ORAL DAILY
Qty: 10 TABLET | Refills: 0 | Status: SHIPPED | OUTPATIENT
Start: 2019-02-20 | End: 2019-03-02

## 2019-02-20 RX ORDER — HYDROXYCHLOROQUINE SULFATE 200 MG/1
TABLET, FILM COATED ORAL
Qty: 30 TABLET | Refills: 1 | Status: SHIPPED | OUTPATIENT
Start: 2019-02-20 | End: 2020-01-23

## 2019-02-20 RX ORDER — LEVOTHYROXINE SODIUM 100 UG/1
100 TABLET ORAL DAILY
Qty: 30 TABLET | Refills: 1 | Status: SHIPPED | OUTPATIENT
Start: 2019-02-20 | End: 2020-01-23

## 2019-02-20 SDOH — HEALTH STABILITY: MENTAL HEALTH: HOW OFTEN DO YOU HAVE A DRINK CONTAINING ALCOHOL?: NEVER

## 2019-02-20 NOTE — ED AVS SNAPSHOT
[facility], Emergency Department  [address]  [place] [address]  Phone:  [phone]  Fax:  [phone]                                    Yvonne Barron   MRN: [id number]    Department:  [facility], Emergency Department   Date of Visit:  2/20/2019           After Visit Summary Signature Page    I have received my discharge instructions, and my questions have been answered. I have discussed any challenges I see with this plan with the nurse or doctor.    ..........................................................................................................................................  Patient/Patient Representative Signature      ..........................................................................................................................................  Patient Representative Print Name and Relationship to Patient    ..................................................               ................................................  Date                                   Time    ..........................................................................................................................................  Reviewed by Signature/Title    ...................................................              ..............................................  Date                                               Time          22EPIC Rev 08/18

## 2019-02-21 DIAGNOSIS — M32.9 SYSTEMIC LUPUS ERYTHEMATOSUS, UNSPECIFIED SLE TYPE, UNSPECIFIED ORGAN INVOLVEMENT STATUS (H): Primary | ICD-10-CM

## 2019-02-21 RX ORDER — AZATHIOPRINE 50 MG/1
50 TABLET ORAL 2 TIMES DAILY
Qty: 180 TABLET | Refills: 0 | Status: SHIPPED | OUTPATIENT
Start: 2019-02-22 | End: 2020-01-23

## 2019-02-21 RX ORDER — PREDNISONE 20 MG/1
20 TABLET ORAL DAILY
Qty: 60 TABLET | Refills: 0 | Status: SHIPPED | OUTPATIENT
Start: 2019-02-21 | End: 2020-01-23

## 2019-02-21 RX ORDER — PREDNISONE 10 MG/1
20 TABLET ORAL DAILY
Qty: 30 TABLET | Refills: 0 | Status: SHIPPED | OUTPATIENT
Start: 2019-02-21 | End: 2020-01-23

## 2019-02-21 RX ORDER — AZATHIOPRINE 50 MG/1
50 TABLET ORAL DAILY
Qty: 7 TABLET | Refills: 0 | Status: SHIPPED | OUTPATIENT
Start: 2019-02-21 | End: 2020-01-23

## 2019-02-22 NOTE — ED PROVIDER NOTES
History     Chief Complaint   Patient presents with     Dysphagia     Difficutly swallowing since awakening      Facial Swelling     Facial swelling noted upon arrival to ED, no shortness of breath or known allergies, pt reporst history of Lupus      Hemorrhoids     Pt reports 4 hemorrhoids that have bleed with wiping, since arrival to the ED increased bleeding     HPI  Yvonne Barron is a 23 year old female with recently diagnosed SLE who presents with a rash and facial swelling, dysphagia and sore throat and rectal bleeding.  She has noticed some blood on her tissue paper and some bumps around her anus.  She has had no vomiting or hematemesis.  She has not been taking any of her SLE meds for over 2 weeks.  She has no chest pain or shortness of breath.  She states that her current facial rash and swelling are consistent with her lupus.  No headaches or fevers.       PAST MEDICAL HISTORY:   Past Medical History:   Diagnosis Date     Hypothyroidism      Lupus (systemic lupus erythematosus) (H)        PAST SURGICAL HISTORY: History reviewed. No pertinent surgical history.    FAMILY HISTORY: History reviewed. No pertinent family history.    SOCIAL HISTORY:   Social History     Tobacco Use     Smoking status: Never Smoker     Smokeless tobacco: Never Used   Substance Use Topics     Alcohol use: No     Frequency: Never       Discharge Medication List as of 2/20/2019  6:15 PM      START taking these medications    Details   azaTHIOprine (IMURAN) 50 MG tablet Take 1 tablet po Qday for 7 days then take 1 pill PO BID, Disp-30 tablet, R-0, Local Print      potassium chloride ER (K-TAB) 20 MEQ CR tablet Take 1 tablet (20 mEq) by mouth daily for 10 days, Disp-10 tablet, R-0, Local Print         CONTINUE these medications which have CHANGED    Details   hydroxychloroquine (PLAQUENIL) 200 MG tablet Take 300mg PO Qday, Disp-30 tablet, R-1, Local Print      levothyroxine (SYNTHROID/LEVOTHROID) 100 MCG tablet Take 1 tablet (100  mcg) by mouth daily, Disp-30 tablet, R-1, Local Print      predniSONE (DELTASONE) 20 MG tablet Take one tablet PO daily., Disp-30 tablet, R-1, Local PrintPlease include the quantity of tablets and (strength) per dose in sig.           STOP taking these medications       potassium chloride ER (K-DUR/KLOR-CON M) 20 MEQ CR tablet Comments:   Reason for Stopping:                Not on File    I have reviewed the Medications, Allergies, Past Medical and Surgical History, and Social History in the Epic system.    Review of Systems   All other systems reviewed and are negative.      Physical Exam   BP: 104/57  Pulse: 109  Temp: 100.1  F (37.8  C)  Resp: 18  Weight: 59.4 kg (131 lb)  SpO2: 100 %      Physical Exam   Constitutional: She is oriented to person, place, and time. No distress.   HENT:   Head: Normocephalic and atraumatic.   Eyes: Conjunctivae are normal. Pupils are equal, round, and reactive to light.   Neck: Normal range of motion. Neck supple.   Cardiovascular: Normal rate and intact distal pulses.   Pulmonary/Chest: Effort normal. No respiratory distress. She has no wheezes. She has no rales.   Abdominal: Soft. She exhibits no distension. There is no tenderness. There is no rebound and no guarding.   Musculoskeletal: Normal range of motion.   Neurological: She is alert and oriented to person, place, and time.   Skin: Skin is warm and dry. She is not diaphoretic.   Discoid lesions with scaling and swelling to forehead and face,  Perianal shallow ulcerations, no discharge, no bleeding   Psychiatric: She has a normal mood and affect. Her behavior is normal. Thought content normal.   Nursing note and vitals reviewed.      ED Course        Procedures             Critical Care time:  none             Labs Ordered and Resulted from Time of ED Arrival Up to the Time of Departure from the ED - No data to display         Assessments & Plan (with Medical Decision Making)   1.  Lupus flare    22 yo F with lupus who  presents with a lupus flare.  She is having dermatologic manifestations including a discoid facial rash and perianal ulcerations. She feels well and declines labs.  She will follow up with her PCP later this week.  Her medications were refilled including Imuran, Plaquenil, prednisone and Potassium and she will follow up with her PCP next week.       I have reviewed the nursing notes.    I have reviewed the findings, diagnosis, plan and need for follow up with the patient.       Medication List      Started    azaTHIOprine 50 MG tablet  Commonly known as:  IMURAN  Take 1 tablet po Qday for 7 days then take 1 pill PO BID     potassium chloride ER 20 MEQ CR tablet  Commonly known as:  K-TAB  20 mEq, Oral, DAILY        Modified    hydroxychloroquine 200 MG tablet  Commonly known as:  PLAQUENIL  Take 300mg PO Qday  What changed:      how much to take    how to take this    when to take this    additional instructions     levothyroxine 100 MCG tablet  Commonly known as:  SYNTHROID/LEVOTHROID  100 mcg, Oral, DAILY  What changed:      medication strength    how much to take     predniSONE 20 MG tablet  Commonly known as:  DELTASONE  Take one tablet PO daily  What changed:      medication strength    how much to take    how to take this    when to take this    additional instructions        Discontinued    potassium chloride ER 20 MEQ CR tablet  Commonly known as:  K-DUR/KLOR-CON M            Final diagnoses:   Lupus erythematosus, unspecified form       2/20/2019   [facility], [place], EMERGENCY DEPARTMENT     Keegan Brady MD  02/22/19 [id number]

## 2019-02-24 ENCOUNTER — HOSPITAL ENCOUNTER (EMERGENCY)
Facility: CLINIC | Age: 24
Discharge: HOME | End: 2019-02-24
Attending: EMERGENCY MEDICINE | Admitting: EMERGENCY MEDICINE
Payer: COMMERCIAL

## 2019-02-24 VITALS
SYSTOLIC BLOOD PRESSURE: 100 MMHG | DIASTOLIC BLOOD PRESSURE: 61 MMHG | OXYGEN SATURATION: 98 % | RESPIRATION RATE: 16 BRPM | TEMPERATURE: 97.5 F | HEART RATE: 83 BPM

## 2019-02-24 DIAGNOSIS — F41.1 ANXIETY REACTION: ICD-10-CM

## 2019-02-24 PROCEDURE — 99282 EMERGENCY DEPT VISIT SF MDM: CPT

## 2019-02-24 ASSESSMENT — ENCOUNTER SYMPTOMS
ARTHRALGIAS: 1
SHORTNESS OF BREATH: 1
NERVOUS/ANXIOUS: 1
NAUSEA: 1

## 2019-02-24 NOTE — ED PROVIDER NOTES
History     Chief Complaint:  Shortness of breath     HPI   Yvonne Barron is a 23 year old female with a history of lupus and anxiety who presents via EMS with shortness of breath.  The patient reports she became upset when her [employer]  brought her and her cousin to the wrong location.  She started to feel very short of breath and nauseous and then began to panic, similar to panic attacks she has had in the past.  The [employer]  ended up kicking them out of the car and they were out in the cold for approximately and hour before EMS arrived.  By the time of arrival in the ED, she is already feeling calm and feels her symptoms before were from anxiety.  She is currently dealing with a lupus flare which causes increased joint pain.  Her current Prednisone dose is 20 mg daily.  Her only other complaint is that her feet are cold.    Allergies:  No known drug allergies.     Medications:    Azathioprine  Prednisone  Hydroxychloroquine  Levothyroxine   Potassium chloride    Past Medical History:    Hypothyroidism  Systemic lupus erythematosus   Anxiety     Past Surgical History:    History reviewed. No pertinent past surgical history.     Family History:    History reviewed. No pertinent family history.     Social History:  Presents to the ED with her cousin  Tobacco Use: No previous or current tobacco use.   Alcohol Use: No alcohol use.   PCP: [facility]     Review of Systems   Respiratory: Positive for shortness of breath.    Gastrointestinal: Positive for nausea.   Musculoskeletal: Positive for arthralgias.        Positive for coldness of her feet.   Psychiatric/Behavioral: The patient is nervous/anxious.    All other systems reviewed and are negative.    Physical Exam   First Vitals:  BP: (!) 89/59  Pulse: 82  Heart Rate: 82  Temp: 97.5  F (36.4  C)  Resp: 16  SpO2: 98 %    Physical Exam  Nursing note and vitals reviewed.    Constitutional:  Appears well-developed and well-nourished, comfortable.     HENT:    Nose normal.  No discharge.      Oropharynx is clear and moist.  Eyes:    Conjunctivae are normal without injection. No lid droop.     Pupils are equal, round, and reactive to light.   Cardiovascular:  Normal rate, regular rhythm with normal S1 and S2.      Normal heart sounds and peripheral pulses 2+ and equal.       No murmur or mariel.  Pulmonary:  Effort normal and breath sounds clear to auscultation bilaterally   No respiratory distress.  No stridor.     No wheezes. No rales.     Neurological:   Alert and oriented.      Exhibits good muscle tone. Coordination normal.      GCS eye subscore is 4. GCS verbal subscore is 5.      GCS motor subscore is 6.   Skin:    Skin is warm and dry. No rash noted. No diaphoresis.      No erythema. No pallor.  No lesions.  Psychiatric:   Behavior is normal. Flat affect.     Judgment and thought content normal.      Emergency Department Course     Emergency Department Course:  The patient arrived in the emergency department via EMS.   Nursing notes and vitals reviewed.  I performed an exam of the patient as documented above.     Repeat BP: 93/67    Findings and plan explained to the patient. Patient discharged home with instructions regarding supportive care, medications, and reasons to return. The importance of close follow-up was reviewed.     Impression & Plan      Medical Decision Making:  Patient comes in after having what she thinks was a panic attack.  It is completely resolved.  She was upset over some things that happened today.  She has had these before.  She runs a low blood pressure around 100 normally.  The initial one was 89 and now is 97.  She was quite cold when she came in.  She was warmed up and wanted to go home at this point.  I feels reasonable and she can follow-up in the clinic this week if she has further anxiety issues.    Diagnosis:    ICD-10-CM    1. Anxiety reaction F41.1      Disposition:  Discharged to home.     Patient instructions:  Stay  warm today, push fluids and rest.  Follow-up in the clinic this week with your doctor if you have continued anxiety.  Also regarding your lupus flare, follow-up with your rheumatologist    Discharge Medications:  None      I, Isabel Arceo, am serving as a scribe on 2/24/2019 at 12:48 PM to personally document services performed by Dr. Mirza based on my observations and the provider's statements to me.    Isabel Arceo  2/24/2019    EMERGENCY DEPARTMENT       Jennifer Mirza MD  02/25/19 0859

## 2019-02-24 NOTE — ED AVS SNAPSHOT
Emergency Department  [address] [place] [address]  Phone:  [phone]  Fax:  [phone]                                    Yvonne Barron   MRN: [id number]    Department:   Emergency Department   Date of Visit:  2/24/2019           After Visit Summary Signature Page    I have received my discharge instructions, and my questions have been answered. I have discussed any challenges I see with this plan with the nurse or doctor.    ..........................................................................................................................................  Patient/Patient Representative Signature      ..........................................................................................................................................  Patient Representative Print Name and Relationship to Patient    ..................................................               ................................................  Date                                   Time    ..........................................................................................................................................  Reviewed by Signature/Title    ...................................................              ..............................................  Date                                               Time          22EPIC Rev 08/18

## 2019-02-24 NOTE — DISCHARGE INSTRUCTIONS
Stay warm today, push fluids and rest.  Follow-up in the clinic this week with your doctor if you have continued anxiety.  Also regarding your lupus flare, follow-up with your rheumatologist.

## 2019-02-24 NOTE — ED NOTES
Bed: ED22  Expected date: 2/24/19  Expected time: 12:03 PM  Means of arrival: Ambulance  Comments:  [age]f SOB, anxiety ETA 1209

## 2019-02-28 ENCOUNTER — TELEPHONE (OUTPATIENT)
Dept: RHEUMATOLOGY | Facility: CLINIC | Age: 24
End: 2019-02-28

## 2019-02-28 NOTE — TELEPHONE ENCOUNTER
Form Request Documentation    Name of Form: [facility] Request for Medical Opinion    Date Received: 2/26/2019    Mode Received: patient dropped off     Provider: Saurabh    Date Needed By: Patient stated that she would like to  the form. I explained to patient that Dr. Wiley is not in clinic until 2/28/2019 to review the form. Patient verbalized understanding.    Date Given to Provider: Provider in clinic 2/28/2019.     Mirna Gregory CMA  2/28/2019 9:39 AM

## 2019-02-28 NOTE — TELEPHONE ENCOUNTER
Dr. Wiley reviewed the form and wanted to know why the patient needed this filled out. I called the patient who stated that she lost her job due to her lupus. She is attempting to apply for food stamps, but, is in need of Dr. Wiley to attest to her symptoms of lupus. I let patient know that I will pass this information on to Dr. Wiley.   Mirna Gregory CMA  2/28/2019 2:10 PM

## 2019-03-01 NOTE — TELEPHONE ENCOUNTER
Form Request Documentation    Provider Agreed to complete form? yes    Date Returned to support Staff: 3/1/2019     Date patient notified: 3/1/2019     Disposition of Form: Patient will  the form at 12:00 pm today, 3/1/2019.    Mirna Gregory CMA  3/1/2019 8:53 AM

## 2019-03-09 ENCOUNTER — HEALTH MAINTENANCE LETTER (OUTPATIENT)
Age: 24
End: 2019-03-09

## 2019-03-27 ENCOUNTER — TELEPHONE (OUTPATIENT)
Dept: RHEUMATOLOGY | Facility: CLINIC | Age: 24
End: 2019-03-27

## 2019-03-27 NOTE — TELEPHONE ENCOUNTER
[facility] Call Center    Phone Message    May a detailed message be left on voicemail: yes    Reason for Call: Medication Question or concern regarding medication   Prescription Clarification  Name of Medication: prednisone  Prescribing Provider: Dr Yue Wiley   Pharmacy: [facility]   What on the order needs clarification? Pt has been on medication since ~December and reports some bloating. She was never told if/when she should discontinue use. Please reach out to pt to discuss          Action Taken: Message routed to:  [facility] ([facility]): rheum

## 2019-03-29 NOTE — TELEPHONE ENCOUNTER
"Pt is now in [place], she had to move home as it was too hard to live with out support. She now has out of state insurance and is having a hard time getting in to see a provider locally in [place].      Pt did have a fever last night, did improve after taking tylenol.  Otherwise feeling fine just bloated. Pt no longer has imuran, she ran out of medication.     She is taking prednisone at 20 mg a day, is taking HCQ 1.5 tablets daily and synthroid.  She is feeling very cushingoid, cheeks are very \"deformed\"  Per pt.     Pt is working on getting insurance in [place], but currently has no where to get labs and is not able to see a provider.    She is wondering if she can taper prednisone.  She would really like to get off it if possible.    Manisha Michael RN  Rheumatology Clinic    "

## 2019-04-01 NOTE — TELEPHONE ENCOUNTER
Left message requesting that pt return call to clinic.    Manisha Michael RN  Rheumatology Clinic

## 2019-04-01 NOTE — TELEPHONE ENCOUNTER
Discussed with Dr. Diaz.  She is willing to start decreasing pt's prednisone, will start with decreasing to 15 mg a day, but would like to get pt back on Azathioprine.  Will need to make sure that pt is ok to get labs 2-3 weeks after starting Azathioprine.      Manisha Michael RN  Rheumatology Clinic

## 2019-04-02 NOTE — TELEPHONE ENCOUNTER
Pt was admitted to [facility] in [place], over the weekend.  Pt went to ER on Friday as she was running a fever of 104, Was diagnosed with Sepsis, doctor would like her to wean off prednisone, she is currently on 30 mg a day, and he would like to take her off plaquenil, as he says it is an immunosuppressant and with her sepsis she should not be on it.  Have requested that she ask Dr. Anton call us so that we may discuss with him.  Have discussed with Dr. Wiley, she is more than happy to talk with him, I provided my number to pt, so she can ask him to call and I will connect him with Dr. Wiley.      Manisha Michael RN  Rheumatology Clinic

## 2019-04-06 NOTE — TELEPHONE ENCOUNTER
Receieved call from Dr. Rolando Peterson, from [facility] requesting a return call from Dr. Wiley to discuss pt.  Message was left after business hours at 1740 on Friday 4/5/19.  Phone number to call: [phone].    Sent to provider.      Manisha Michael RN  Rheumatology Clinic

## 2019-04-16 NOTE — TELEPHONE ENCOUNTER
Called and spoke with pt, she was readmitted to the hospital due to low WBC and remains there now.  Pt is getting a bone marrow biopsy.  Face is swollen.  Pt has been admitted to [facility] where they have Rheumatology care.  Have let her know that if Rheumatology has any questions for Dr Wiley they can certainly call here for any information.    Let pt know that since she is now under the care of rheumatology in [place], we will stop following with her.  She is thankful for care and treatment.    Manisha Michael RN  Rheumatology Clinic

## 2020-01-22 ENCOUNTER — TELEPHONE (OUTPATIENT)
Dept: RHEUMATOLOGY | Facility: CLINIC | Age: 25
End: 2020-01-22

## 2020-01-22 NOTE — TELEPHONE ENCOUNTER
Left message for pt reminding them of upcoming appointment.  Instructed pt to bring updated medications list.  Mari Pina, CMA

## 2020-01-23 ENCOUNTER — OFFICE VISIT (OUTPATIENT)
Dept: RHEUMATOLOGY | Facility: CLINIC | Age: 25
End: 2020-01-23
Attending: INTERNAL MEDICINE
Payer: COMMERCIAL

## 2020-01-23 VITALS
OXYGEN SATURATION: 100 % | BODY MASS INDEX: 19.69 KG/M2 | HEART RATE: 99 BPM | SYSTOLIC BLOOD PRESSURE: 99 MMHG | WEIGHT: 122 LBS | DIASTOLIC BLOOD PRESSURE: 64 MMHG

## 2020-01-23 DIAGNOSIS — R63.4 WEIGHT LOSS: ICD-10-CM

## 2020-01-23 DIAGNOSIS — E55.9 VITAMIN D DEFICIENCY: ICD-10-CM

## 2020-01-23 DIAGNOSIS — T69.1XXA CHILBLAIN LUPUS ERYTHEMATOSUS, INITIAL ENCOUNTER: ICD-10-CM

## 2020-01-23 DIAGNOSIS — I73.01 RAYNAUD'S PHENOMENON WITH GANGRENE (H): ICD-10-CM

## 2020-01-23 DIAGNOSIS — M32.9 SYSTEMIC LUPUS ERYTHEMATOSUS, UNSPECIFIED SLE TYPE, UNSPECIFIED ORGAN INVOLVEMENT STATUS (H): Primary | ICD-10-CM

## 2020-01-23 PROCEDURE — G0463 HOSPITAL OUTPT CLINIC VISIT: HCPCS | Mod: ZF

## 2020-01-23 RX ORDER — MYCOPHENOLATE MOFETIL 500 MG/1
1000 TABLET ORAL
COMMUNITY
Start: 2020-01-16 | End: 2020-01-23

## 2020-01-23 RX ORDER — AMLODIPINE BESYLATE 2.5 MG/1
2.5 TABLET ORAL
COMMUNITY
Start: 2019-12-06 | End: 2020-01-23

## 2020-01-23 RX ORDER — CLOBETASOL PROPIONATE 0.5 MG/G
OINTMENT TOPICAL 2 TIMES DAILY
Qty: 30 G | Refills: 0 | Status: SHIPPED | OUTPATIENT
Start: 2020-01-23 | End: 2020-06-25

## 2020-01-23 RX ORDER — HYDROXYCHLOROQUINE SULFATE 200 MG/1
200 TABLET, FILM COATED ORAL 2 TIMES DAILY
Qty: 60 TABLET | Refills: 3 | Status: SHIPPED | OUTPATIENT
Start: 2020-01-23 | End: 2020-06-12

## 2020-01-23 RX ORDER — MYCOPHENOLATE MOFETIL 500 MG/1
1500 TABLET ORAL 2 TIMES DAILY
Qty: 180 TABLET | Refills: 3 | Status: SHIPPED | OUTPATIENT
Start: 2020-01-23 | End: 2020-06-12

## 2020-01-23 RX ORDER — HYDROQUINONE 40 MG/G
CREAM TOPICAL
COMMUNITY
Start: 2019-08-09 | End: 2020-11-13

## 2020-01-23 RX ORDER — SILDENAFIL CITRATE 20 MG/1
20 TABLET ORAL 3 TIMES DAILY
Qty: 90 TABLET | Refills: 3 | Status: SHIPPED | OUTPATIENT
Start: 2020-01-23 | End: 2020-02-29

## 2020-01-23 RX ORDER — LEVOTHYROXINE SODIUM 75 UG/1
75 TABLET ORAL DAILY
COMMUNITY

## 2020-01-23 ASSESSMENT — PAIN SCALES - GENERAL: PAINLEVEL: MODERATE PAIN (5)

## 2020-01-23 NOTE — PATIENT INSTRUCTIONS
Increase cellcept to 3 tabs of 500 mg twice a day    Use the clobetasol ointment over your toes    Stop amlodipine as it is not working    Start revatio for Raynaud's    Refer to nutrition    Feb 13 at [facility] with both Dr. Quinonez and myself at 11:0 am (add on for me)

## 2020-01-23 NOTE — LETTER
1/23/2020       RE: Yvonne Barron  [address]  [place] [address]     Dear Colleague,    Thank you for referring your patient, Yvonne Barron, to the [facility] RHEUMATOLOGY at [facility]. Please see a copy of my visit note below.    Rheumatology Clinic New Patient Visit - Attending    Last seen: 2/14/2019    DOS: 1/23/2020    Reason for evaluation: SLE    SUBJECTIVE:  The patient is a 24 yo woman with a hx of Graves disease s/p radioactive iodine ablation in 2013, post-ablation hypothyroidism, who was recently diagnosed with SLE in 11/2018 in a primary care setting presents to establish rheumatology care. She initially presented with raynaud's, rash over face and legs, nasal and oral ulcers, weight loss of 30 lbs, and severe fatigue. Was found to have Leukopenia, thrombocytopenia (130), sed rate 45, SHANDA 1:320, Sm>8, SSA>8, RNP>8, DS DNA 17, low C3 C4. She was started on hydroxychloroquine 300/day, prednisone 30 mg/day, and subsequently - verapamil for Raynauds. About 2 weeks after beginning medications, she developed lapses in memory - could not remember whole days, or an e-mail she had sent. Found herself driving to [place] for no clear reason, until she ran out of gas. She became concerned that these symptoms were due to new medications and stopped taking them. She started feeling more lucid soon after.    Patient denies any fevers, chills, vision changes, seizures, focal weakness or numbness.  Denies any arthralgias, morning stiffness, myalgias,  Alopecia, vitiligo, ear fullness or drainage.   No cough or dyspnea, pleuricy, no abdominal pain, nausea, diarrhea, no hematuria, no history of blood clots.         Today 1/23/2020: Last seen by Dr. Wiley in 2/2019, then [place] [facility] to re-establish here as it's closer to her school.    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    Her hair is growing back. Had oral/nasal ulcers before not recently.    No fatigue.    Her feet hurt, chronic, worse over past month. Pain gets worse towards the end of the day after walking, gets worse with driving.    No CP, SOB or cough.    Sometimes has mild nausea. Lost weight with Dx of lupus, but gained some back, still has loss of appetite.    No headaches, seizures.    Has raynaud's, fingers turn red, white, blue. It causes numbness, tingling.    Reports having sores on the bottom of her feet. Has it since Nov 2019 with drop in T.      Currently off prednsione.    On  mg bid, had eye exam in 8/2019.    Off AZA, took it only for one month.    On cellcept 500 mg bid about a yr.      Started norvasc 2.5 mg qd in 12/2019 and it has not helped with raynaud's.    Additional pertinent labs:    RF CCP neg  11/15 hypothyroid TSH 15  Marked hypokalemia  EBV IgG high, IgM neg Parvo B19 IgG high    Family hx negative for autoimmune disease, positive for diabetes and hypertension.      REVIEW OF SYMPTOMS:  A comprehensive ROS was done. Positives are per HPI.        HISTORY REVIEW:  Past Medical History:   Diagnosis Date     Hypothyroidism      Lupus (systemic lupus erythematosus) (H)      No past surgical history on file.  No family history on file.  Social History     Socioeconomic History     Marital status: Single     Spouse name: Not on file     Number of children: Not on file     Years of education: Not on file     Highest education level: Not on file   Occupational History     Not on file   Social Needs     Financial resource strain: Not on file     Food insecurity:     Worry: Not on file     Inability: Not on file     Transportation needs:     Medical: Not on file     Non-medical: Not on file   Tobacco Use     Smoking status: Never Smoker     Smokeless tobacco: Never Used   Substance and Sexual Activity     Alcohol use: No     Frequency: Never     Drug use: No     Sexual activity: Never   Lifestyle      Physical activity:     Days per week: Not on file     Minutes per session: Not on file     Stress: Not on file   Relationships     Social connections:     Talks on phone: Not on file     Gets together: Not on file     Attends [employer] service: Not on file     Active member of club or organization: Not on file     Attends meetings of clubs or organizations: Not on file     Relationship status: Not on file     Intimate partner violence:     Fear of current or ex partner: Not on file     Emotionally abused: Not on file     Physically abused: Not on file     Forced sexual activity: Not on file   Other Topics Concern     Not on file   Social History Narrative     Not on file     There is no problem list on file for this patient.    Not on File    I reviewed the Current Medications:  Current Outpatient Medications   Medication Sig Dispense Refill     azaTHIOprine (IMURAN) 50 MG tablet Take 1 tablet (50 mg) by mouth daily for 7 days Labs every 8-12 weeks. 7 tablet 0     azaTHIOprine (IMURAN) 50 MG tablet Take 1 tablet (50 mg) by mouth 2 times daily Labs every 8-12 weeks. 180 tablet 0     azaTHIOprine (IMURAN) 50 MG tablet Take 1 tablet po Qday for 7 days then take 1 pill PO BID 30 tablet 0     hydroxychloroquine (PLAQUENIL) 200 MG tablet Take 300mg PO Qday 30 tablet 1     levothyroxine (SYNTHROID/LEVOTHROID) 100 MCG tablet Take 1 tablet (100 mcg) by mouth daily 30 tablet 1     predniSONE (DELTASONE) 10 MG tablet Take 20 mg by mouth daily. 30 tablet 0     predniSONE (DELTASONE) 20 MG tablet Take 20 mg by mouth daily. 60 tablet 0         I personally reviewed and independently visualized the following images:  1/21/19  CT head without contrast.    COMPARISON:  None available     FINDINGS:  The ventricles are in proportion to the cortical sulci consistent with patient`s stated age. The gray-white matter junction is distinct. Negative for acute intracranial hemorrhage, extra-axial fluid collection or midline shift. The  basal cisterns are intact.     There is mild mucosal thickening of the ethmoid sinuses. The remainder of the visualized paranasal sinuses are clear. The bony calvarium is intact.     IMPRESSION:  Unremarkable noncontrast head CT without acute intracranial abnormality.      Vitals: Blood pressure 99/64, pulse 99, weight 55.3 kg (122 lb), SpO2 100 %.      PHYSICAL EXAM  Constitutional: ill looking, NAD  Eyes: nl EOM, PERRLA, vision, conjunctiva, sclera  ENT: nl external ears, nose, hearing, lips, teeth, gums, throat  No mucous membrane lesions, normal saliva pool  Neck: no mass or thyroid enlargement  Resp: lungs clear to auscultation, nl to palpation  CV: RRR, no murmurs, rubs or gallops, no edema  GI: no ABD tenderness  : not tested  Lymph: no cervical, supraclavicular nodes  MS: no joint tenderness or active synovitis  Skin: purple discoloration of the toes with petechiae over toes  Neuro: nl cranial nerves, strength  Psych: nl  affect.      ASSESSMENT and PLAN:  SLE with weight loss, ongoing arthralgia, active lupus serology and what seems to be new chill julia lesions over toes. Labs in 12/2019 done at [facility] show leukopenia, borderline pos anti-DNA and low C3/C4. Discussed Dx, Tx plan, labs.    Today plan of care:      Increase cellcept to 3 tabs of 500 mg twice a day, was informed that it is teratogenic. Risks were discussed. Labs in 4 wk then q3mo.     Use the clobetasol ointment over your toes     Stop amlodipine as it is not working     Start revatio for Raynaud's; risks were discussed     Refer to nutrition     Feb 13 at [facility] derm with both Dr. Quinonez and myself at 11:0 am (add on for me)      Orders Placed This Encounter   Procedures     Vitamin D Deficiency     NUTRITION REFERRAL          Ezio Irby MD

## 2020-01-23 NOTE — PROGRESS NOTES
Rheumatology Clinic New Patient Visit - Attending    Last seen: 2/14/2019    DOS: 1/23/2020    Reason for evaluation: SLE    SUBJECTIVE:  The patient is a 22 yo woman with a hx of Graves disease s/p radioactive iodine ablation in 2013, post-ablation hypothyroidism, who was recently diagnosed with SLE in 11/2018 in a primary care setting presents to establish rheumatology care. She initially presented with raynaud's, rash over face and legs, nasal and oral ulcers, weight loss of 30 lbs, and severe fatigue. Was found to have Leukopenia, thrombocytopenia (130), sed rate 45, SHANDA 1:320, Sm>8, SSA>8, RNP>8, DS DNA 17, low C3 C4. She was started on hydroxychloroquine 300/day, prednisone 30 mg/day, and subsequently - verapamil for Raynauds. About 2 weeks after beginning medications, she developed lapses in memory - could not remember whole days, or an e-mail she had sent. Found herself driving to [place] for no clear reason, until she ran out of gas. She became concerned that these symptoms were due to new medications and stopped taking them. She started feeling more lucid soon after.    Patient denies any fevers, chills, vision changes, seizures, focal weakness or numbness.  Denies any arthralgias, morning stiffness, myalgias,  Alopecia, vitiligo, ear fullness or drainage.   No cough or dyspnea, pleuricy, no abdominal pain, nausea, diarrhea, no hematuria, no history of blood clots.         Today 1/23/2020: Last seen by Dr. Wiley in 2/2019, then [place] [facility] [place] here as it's closer to her school.    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    Her hair is growing back. Had oral/nasal ulcers before not recently.    No fatigue.    Her feet hurt, chronic, worse over past month. Pain gets worse towards the end of the day after walking, gets worse with driving.    No CP, SOB or cough.    Sometimes has mild nausea. Lost weight  with Dx of lupus, but gained some back, still has loss of appetite.    No headaches, seizures.    Has raynaud's, fingers turn red, white, blue. It causes numbness, tingling.    Reports having sores on the bottom of her feet. Has it since Nov 2019 with drop in T.      Currently off prednsione.    On  mg bid, had eye exam in 8/2019.    Off AZA, took it only for one month.    On cellcept 500 mg bid about a yr.      Started norvasc 2.5 mg qd in 12/2019 and it has not helped with raynaud's.    Additional pertinent labs:    RF CCP neg  11/15 hypothyroid TSH 15  Marked hypokalemia  EBV IgG high, IgM neg Parvo B19 IgG high    Family hx negative for autoimmune disease, positive for diabetes and hypertension.      REVIEW OF SYMPTOMS:  A comprehensive ROS was done. Positives are per HPI.        HISTORY REVIEW:  Past Medical History:   Diagnosis Date     Hypothyroidism      Lupus (systemic lupus erythematosus) (H)      No past surgical history on file.  No family history on file.  Social History     Socioeconomic History     Marital status: Single     Spouse name: Not on file     Number of children: Not on file     Years of education: Not on file     Highest education level: Not on file   Occupational History     Not on file   Social Needs     Financial resource strain: Not on file     Food insecurity:     Worry: Not on file     Inability: Not on file     Transportation needs:     Medical: Not on file     Non-medical: Not on file   Tobacco Use     Smoking status: Never Smoker     Smokeless tobacco: Never Used   Substance and Sexual Activity     Alcohol use: No     Frequency: Never     Drug use: No     Sexual activity: Never   Lifestyle     Physical activity:     Days per week: Not on file     Minutes per session: Not on file     Stress: Not on file   Relationships     Social connections:     Talks on phone: Not on file     Gets together: Not on file     Attends Protestant service: Not on file     Active member of club or  organization: Not on file     Attends meetings of clubs or organizations: Not on file     Relationship status: Not on file     Intimate partner violence:     Fear of current or ex partner: Not on file     Emotionally abused: Not on file     Physically abused: Not on file     Forced sexual activity: Not on file   Other Topics Concern     Not on file   Social History Narrative     Not on file     There is no problem list on file for this patient.    Not on File    I reviewed the Current Medications:  Current Outpatient Medications   Medication Sig Dispense Refill     azaTHIOprine (IMURAN) 50 MG tablet Take 1 tablet (50 mg) by mouth daily for 7 days Labs every 8-12 weeks. 7 tablet 0     azaTHIOprine (IMURAN) 50 MG tablet Take 1 tablet (50 mg) by mouth 2 times daily Labs every 8-12 weeks. 180 tablet 0     azaTHIOprine (IMURAN) 50 MG tablet Take 1 tablet po Qday for 7 days then take 1 pill PO BID 30 tablet 0     hydroxychloroquine (PLAQUENIL) 200 MG tablet Take 300mg PO Qday 30 tablet 1     levothyroxine (SYNTHROID/LEVOTHROID) 100 MCG tablet Take 1 tablet (100 mcg) by mouth daily 30 tablet 1     predniSONE (DELTASONE) 10 MG tablet Take 20 mg by mouth daily. 30 tablet 0     predniSONE (DELTASONE) 20 MG tablet Take 20 mg by mouth daily. 60 tablet 0         I personally reviewed and independently visualized the following images:  1/21/19  CT head without contrast.    COMPARISON:  None available     FINDINGS:  The ventricles are in proportion to the cortical sulci consistent with patient`s stated age. The gray-white matter junction is distinct. Negative for acute intracranial hemorrhage, extra-axial fluid collection or midline shift. The basal cisterns are intact.     There is mild mucosal thickening of the ethmoid sinuses. The remainder of the visualized paranasal sinuses are clear. The bony calvarium is intact.     IMPRESSION:  Unremarkable noncontrast head CT without acute intracranial abnormality.      Vitals: Blood  pressure 99/64, pulse 99, weight 55.3 kg (122 lb), SpO2 100 %.      PHYSICAL EXAM  Constitutional: ill looking, NAD  Eyes: nl EOM, PERRLA, vision, conjunctiva, sclera  ENT: nl external ears, nose, hearing, lips, teeth, gums, throat  No mucous membrane lesions, normal saliva pool  Neck: no mass or thyroid enlargement  Resp: lungs clear to auscultation, nl to palpation  CV: RRR, no murmurs, rubs or gallops, no edema  GI: no ABD tenderness  : not tested  Lymph: no cervical, supraclavicular nodes  MS: no joint tenderness or active synovitis  Skin: purple discoloration of the toes with petechiae over toes  Neuro: nl cranial nerves, strength  Psych: nl  affect.      ASSESSMENT and PLAN:  SLE with weight loss, ongoing arthralgia, active lupus serology and what seems to be new chill julia lesions over toes. Labs in 12/2019 done at [facility] [place] show leukopenia, borderline pos anti-DNA and low C3/C4. Discussed Dx, Tx plan, labs.    Today plan of care:      Increase cellcept to 3 tabs of 500 mg twice a day, was informed that it is teratogenic. Risks were discussed. Labs in 4 wk then q3mo.     Use the clobetasol ointment over your toes     Stop amlodipine as it is not working     Start revatio for Raynaud's; risks were discussed     Refer to nutrition     Feb 13 at [facility] derm with both Dr. Quinonez and myself at 11:0 am (add on for me)      Orders Placed This Encounter   Procedures     Vitamin D Deficiency     NUTRITION REFERRAL

## 2020-01-23 NOTE — LETTER
1/23/2020      RE: Yvonne Barron  [address]  [facility] [address]       Rheumatology Clinic New Patient Visit - Attending    Last seen: 2/14/2019    DOS: 1/23/2020    Reason for evaluation: SLE    SUBJECTIVE:  The patient is a 24 yo woman with a hx of Graves disease s/p radioactive iodine ablation in 2013, post-ablation hypothyroidism, who was recently diagnosed with SLE in 11/2018 in a primary care setting presents to establish rheumatology care. She initially presented with raynaud's, rash over face and legs, nasal and oral ulcers, weight loss of 30 lbs, and severe fatigue. Was found to have Leukopenia, thrombocytopenia (130), sed rate 45, SHANDA 1:320, Sm>8, SSA>8, RNP>8, DS DNA 17, low C3 C4. She was started on hydroxychloroquine 300/day, prednisone 30 mg/day, and subsequently - verapamil for Raynauds. About 2 weeks after beginning medications, she developed lapses in memory - could not remember whole days, or an e-mail she had sent. Found herself driving to [place] for no clear reason, until she ran out of gas. She became concerned that these symptoms were due to new medications and stopped taking them. She started feeling more lucid soon after.    Patient denies any fevers, chills, vision changes, seizures, focal weakness or numbness.  Denies any arthralgias, morning stiffness, myalgias,  Alopecia, vitiligo, ear fullness or drainage.   No cough or dyspnea, pleuricy, no abdominal pain, nausea, diarrhea, no hematuria, no history of blood clots.         Today 1/23/2020: Last seen by Dr. Wiley in 2/2019, then transferred care to [facility], now wants to re-establish here as it's closer to her school.    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    Her hair is growing back. Had oral/nasal ulcers before not recently.    No fatigue.    Her feet hurt, chronic, worse over past month. Pain gets worse towards the end of the day after walking, gets  worse with driving.    No CP, SOB or cough.    Sometimes has mild nausea. Lost weight with Dx of lupus, but gained some back, still has loss of appetite.    No headaches, seizures.    Has raynaud's, fingers turn red, white, blue. It causes numbness, tingling.    Reports having sores on the bottom of her feet. Has it since Nov 2019 with drop in T.      Currently off prednsione.    On  mg bid, had eye exam in 8/2019.    Off AZA, took it only for one month.    On cellcept 500 mg bid about a yr.      Started norvasc 2.5 mg qd in 12/2019 and it has not helped with raynaud's.    Additional pertinent labs:    RF CCP neg  11/15 hypothyroid TSH 15  Marked hypokalemia  EBV IgG high, IgM neg Parvo B19 IgG high    Family hx negative for autoimmune disease, positive for diabetes and hypertension.      REVIEW OF SYMPTOMS:  A comprehensive ROS was done. Positives are per HPI.        HISTORY REVIEW:  Past Medical History:   Diagnosis Date     Hypothyroidism      Lupus (systemic lupus erythematosus) (H)      No past surgical history on file.  No family history on file.  Social History     Socioeconomic History     Marital status: Single     Spouse name: Not on file     Number of children: Not on file     Years of education: Not on file     Highest education level: Not on file   Occupational History     Not on file   Social Needs     Financial resource strain: Not on file     Food insecurity:     Worry: Not on file     Inability: Not on file     Transportation needs:     Medical: Not on file     Non-medical: Not on file   Tobacco Use     Smoking status: Never Smoker     Smokeless tobacco: Never Used   Substance and Sexual Activity     Alcohol use: No     Frequency: Never     Drug use: No     Sexual activity: Never   Lifestyle     Physical activity:     Days per week: Not on file     Minutes per session: Not on file     Stress: Not on file   Relationships     Social connections:     Talks on phone: Not on file     Gets  together: Not on file     Attends Oriental orthodox service: Not on file     Active member of club or organization: Not on file     Attends meetings of clubs or organizations: Not on file     Relationship status: Not on file     Intimate partner violence:     Fear of current or ex partner: Not on file     Emotionally abused: Not on file     Physically abused: Not on file     Forced sexual activity: Not on file   Other Topics Concern     Not on file   Social History Narrative     Not on file     There is no problem list on file for this patient.    Not on File    I reviewed the Current Medications:  Current Outpatient Medications   Medication Sig Dispense Refill     azaTHIOprine (IMURAN) 50 MG tablet Take 1 tablet (50 mg) by mouth daily for 7 days Labs every 8-12 weeks. 7 tablet 0     azaTHIOprine (IMURAN) 50 MG tablet Take 1 tablet (50 mg) by mouth 2 times daily Labs every 8-12 weeks. 180 tablet 0     azaTHIOprine (IMURAN) 50 MG tablet Take 1 tablet po Qday for 7 days then take 1 pill PO BID 30 tablet 0     hydroxychloroquine (PLAQUENIL) 200 MG tablet Take 300mg PO Qday 30 tablet 1     levothyroxine (SYNTHROID/LEVOTHROID) 100 MCG tablet Take 1 tablet (100 mcg) by mouth daily 30 tablet 1     predniSONE (DELTASONE) 10 MG tablet Take 20 mg by mouth daily. 30 tablet 0     predniSONE (DELTASONE) 20 MG tablet Take 20 mg by mouth daily. 60 tablet 0         I personally reviewed and independently visualized the following images:  1/21/19  CT head without contrast.    COMPARISON:  None available     FINDINGS:  The ventricles are in proportion to the cortical sulci consistent with patient`s stated age. The gray-white matter junction is distinct. Negative for acute intracranial hemorrhage, extra-axial fluid collection or midline shift. The basal cisterns are intact.     There is mild mucosal thickening of the ethmoid sinuses. The remainder of the visualized paranasal sinuses are clear. The bony calvarium is intact.      IMPRESSION:  Unremarkable noncontrast head CT without acute intracranial abnormality.      Vitals: Blood pressure 99/64, pulse 99, weight 55.3 kg (122 lb), SpO2 100 %.      PHYSICAL EXAM  Constitutional: ill looking, NAD  Eyes: nl EOM, PERRLA, vision, conjunctiva, sclera  ENT: nl external ears, nose, hearing, lips, teeth, gums, throat  No mucous membrane lesions, normal saliva pool  Neck: no mass or thyroid enlargement  Resp: lungs clear to auscultation, nl to palpation  CV: RRR, no murmurs, rubs or gallops, no edema  GI: no ABD tenderness  : not tested  Lymph: no cervical, supraclavicular nodes  MS: no joint tenderness or active synovitis  Skin: purple discoloration of the toes with petechiae over toes  Neuro: nl cranial nerves, strength  Psych: nl  affect.      ASSESSMENT and PLAN:  SLE with weight loss, ongoing arthralgia, active lupus serology and what seems to be new chill julia lesions over toes. Labs in 12/2019 done at [facility] show leukopenia, borderline pos anti-DNA and low C3/C4. Discussed Dx, Tx plan, labs.    Today plan of care:      Increase cellcept to 3 tabs of 500 mg twice a day, was informed that it is teratogenic. Risks were discussed. Labs in 4 wk then q3mo.     Use the clobetasol ointment over your toes     Stop amlodipine as it is not working     Start revatio for Raynaud's; risks were discussed     Refer to nutrition     Feb 13 at [facility] with both Dr. Quinonez and myself at 11:0 am (add on for me)      Orders Placed This Encounter   Procedures     Vitamin D Deficiency     NUTRITION REFERRAL         Ezio Irby MD

## 2020-01-23 NOTE — NURSING NOTE
Chief Complaint   Patient presents with     Consult     Lupus     Blood pressure 99/64, pulse 99, weight 55.3 kg (122 lb), SpO2 100 %.    Pablo Wetzel/BRYANNA  January 23, 2020 10:24 AM

## 2020-01-23 NOTE — TELEPHONE ENCOUNTER
[facility] Call Center    Phone Message    May a detailed message be left on voicemail: yes    Reason for Call: Other: Patient called to schedule a 2/13/20 appt with Dr. Irby and Dr. Quinonez together, as per Dr. Irby; however, Dr. Irby doesn't have anything available on 2/13/20.  Please follow up with patient.  Thank you!     Action Taken: Message routed to:  [facility] ([facility]): [facility]

## 2020-01-24 ENCOUNTER — TELEPHONE (OUTPATIENT)
Dept: RHEUMATOLOGY | Facility: CLINIC | Age: 25
End: 2020-01-24

## 2020-01-24 NOTE — TELEPHONE ENCOUNTER
Pt requested lab orders be placed and a refill on her Vitamin D 5000 international unit(s).  It does appear that she has not had a vitamin D done in a while.  Will discuss with Dr. Irby.    Clinic Coordinator did call and schedule follow up appts.    Manisha Michael RN  Rheumatology Clinic

## 2020-01-24 NOTE — TELEPHONE ENCOUNTER
Prior Authorization Retail Medication Request    Medication/Dose: Sildenafil 20 mg 3 times daily   ICD code (if different than what is on RX):  I73.01  Previously Tried and Failed:    Rationale:  Requires for disease process - Raynaud's     Insurance Name:  [employer] [facility]*  Insurance ID:  [id number]       Pharmacy Information (if different than what is on RX)  Name:  [facility] - [place], [place] - [address] [facility]  Phone:  [phone]    ERICK HighN RN   Rheumatology Clinic Nurse   [facility]

## 2020-01-30 NOTE — TELEPHONE ENCOUNTER
Central Prior Authorization Team   [phone]    PA Initiation    Medication: sildenafil (REVATIO) 20 MG tablet   Insurance Company: [employer] - Phone [phone] Fax [phone]  Pharmacy Filling the Rx: [facility] - [place], [place] - [address] [place] [address]  Filling Pharmacy Phone: [phone]  Filling Pharmacy Fax: [phone]  Start Date: 1/30/2020

## 2020-02-03 NOTE — TELEPHONE ENCOUNTER
Prior Authorization Approval    Authorization Effective Date: 1/30/2020  Authorization Expiration Date: 1/30/2021  Medication: sildenafil (REVATIO) 20 MG tablet-PA APPROVED   Approved Dose/Quantity:  Reference #:     Insurance Company: [employer] - Phone [phone] Fax [phone]  Expected CoPay: $1      CoPay Card Available:      Foundation Assistance Needed:    Which Pharmacy is filling the prescription (Not needed for infusion/clinic administered): [facility] - [place], [place] - [address] [place] [address]  Pharmacy Notified: Yes  Patient Notified: Yes    PA Approved with Effective Dates: 1/30/2020-1/30/2021. Spoke to Roberto and received PA  Approval dates and was told that insurance do not provide PA Approval Letter and information is given by phone only.

## 2020-02-04 DIAGNOSIS — E55.9 VITAMIN D DEFICIENCY: ICD-10-CM

## 2020-02-04 DIAGNOSIS — M32.9 SYSTEMIC LUPUS ERYTHEMATOSUS, UNSPECIFIED SLE TYPE, UNSPECIFIED ORGAN INVOLVEMENT STATUS (H): ICD-10-CM

## 2020-02-04 LAB
ALBUMIN SERPL-MCNC: 3.6 G/DL (ref 3.4–5)
ALBUMIN UR-MCNC: NEGATIVE MG/DL
ALT SERPL W P-5'-P-CCNC: 11 U/L (ref 0–50)
APPEARANCE UR: CLEAR
AST SERPL W P-5'-P-CCNC: 15 U/L (ref 0–45)
BACTERIA #/AREA URNS HPF: ABNORMAL /HPF
BASOPHILS # BLD AUTO: 0 10E9/L (ref 0–0.2)
BASOPHILS NFR BLD AUTO: 0 %
BILIRUB UR QL STRIP: NEGATIVE
COLOR UR AUTO: YELLOW
CREAT SERPL-MCNC: 0.43 MG/DL (ref 0.52–1.04)
CREAT UR-MCNC: 150 MG/DL
CRP SERPL-MCNC: <2.9 MG/L (ref 0–8)
DIFFERENTIAL METHOD BLD: ABNORMAL
EOSINOPHIL # BLD AUTO: 0 10E9/L (ref 0–0.7)
EOSINOPHIL NFR BLD AUTO: 0 %
ERYTHROCYTE [DISTWIDTH] IN BLOOD BY AUTOMATED COUNT: 14.2 % (ref 10–15)
ERYTHROCYTE [SEDIMENTATION RATE] IN BLOOD BY WESTERGREN METHOD: 48 MM/H (ref 0–20)
GFR SERPL CREATININE-BSD FRML MDRD: >90 ML/MIN/{1.73_M2}
GLUCOSE UR STRIP-MCNC: NEGATIVE MG/DL
HCT VFR BLD AUTO: 36.4 % (ref 35–47)
HGB BLD-MCNC: 11.3 G/DL (ref 11.7–15.7)
HGB UR QL STRIP: NEGATIVE
IMM GRANULOCYTES # BLD: 0 10E9/L (ref 0–0.4)
IMM GRANULOCYTES NFR BLD: 0 %
KETONES UR STRIP-MCNC: 20 MG/DL
LEUKOCYTE ESTERASE UR QL STRIP: NEGATIVE
LYMPHOCYTES # BLD AUTO: 0.4 10E9/L (ref 0.8–5.3)
LYMPHOCYTES NFR BLD AUTO: 22.5 %
MCH RBC QN AUTO: 28.3 PG (ref 26.5–33)
MCHC RBC AUTO-ENTMCNC: 31 G/DL (ref 31.5–36.5)
MCV RBC AUTO: 91 FL (ref 78–100)
MONOCYTES # BLD AUTO: 0.1 10E9/L (ref 0–1.3)
MONOCYTES NFR BLD AUTO: 7.1 %
MUCOUS THREADS #/AREA URNS LPF: PRESENT /LPF
NEUTROPHILS # BLD AUTO: 1.3 10E9/L (ref 1.6–8.3)
NEUTROPHILS NFR BLD AUTO: 70.4 %
NITRATE UR QL: NEGATIVE
NRBC # BLD AUTO: 0 10*3/UL
NRBC BLD AUTO-RTO: 0 /100
PH UR STRIP: 6 PH (ref 5–7)
PLATELET # BLD AUTO: 148 10E9/L (ref 150–450)
PROT UR-MCNC: 0.81 G/L
PROT/CREAT 24H UR: 0.54 G/G CR (ref 0–0.2)
RBC # BLD AUTO: 3.99 10E12/L (ref 3.8–5.2)
RBC #/AREA URNS AUTO: 1 /HPF (ref 0–2)
SOURCE: ABNORMAL
SP GR UR STRIP: 1.02 (ref 1–1.03)
SQUAMOUS #/AREA URNS AUTO: 1 /HPF (ref 0–1)
UROBILINOGEN UR STRIP-MCNC: 0 MG/DL (ref 0–2)
WBC # BLD AUTO: 1.8 10E9/L (ref 4–11)
WBC #/AREA URNS AUTO: 1 /HPF (ref 0–5)

## 2020-02-04 NOTE — LETTER
Patient:  Yvonne Barron  :   1995  MRN:     [id number]        Ms.Farhiya MAGGIE Barron  [address]  [facility] [address]        2020    Dear ,    We are writing to inform you of your test results. Low white count, satble lupus labs. Will repeat labs in March (ordered).    Results for orders placed or performed in visit on 20   Vitamin D Deficiency     Status: None   Result Value Ref Range    Vitamin D Deficiency screening 72 20 - 75 ug/L   UA with Microscopic reflex to Culture     Status: Abnormal   Result Value Ref Range    Color Urine Yellow     Appearance Urine Clear     Glucose Urine Negative NEG^Negative mg/dL    Bilirubin Urine Negative NEG^Negative    Ketones Urine 20 (A) NEG^Negative mg/dL    Specific Gravity Urine 1.017 1.003 - 1.035    Blood Urine Negative NEG^Negative    pH Urine 6.0 5.0 - 7.0 pH    Protein Albumin Urine Negative NEG^Negative mg/dL    Urobilinogen mg/dL 0.0 0.0 - 2.0 mg/dL    Nitrite Urine Negative NEG^Negative    Leukocyte Esterase Urine Negative NEG^Negative    Source Midstream Urine     WBC Urine 1 0 - 5 /HPF    RBC Urine 1 0 - 2 /HPF    Bacteria Urine Few (A) NEG^Negative /HPF    Squamous Epithelial /HPF Urine 1 0 - 1 /HPF    Mucous Urine Present (A) NEG^Negative /LPF   Protein  random urine with Creat Ratio     Status: Abnormal   Result Value Ref Range    Protein Random Urine 0.81 g/L    Protein Total Urine g/gr Creatinine 0.54 (H) 0 - 0.2 g/g Cr   Complement C4     Status: Abnormal   Result Value Ref Range    Complement C4 7 (L) 13 - 39 mg/dL   Complement C3     Status: Abnormal   Result Value Ref Range    Complement C3 73 (L) 81 - 157 mg/dL   DNA double stranded antibodies     Status: Abnormal   Result Value Ref Range    DNA-ds 32 (H) <10 IU/mL   Erythrocyte sedimentation rate auto     Status: Abnormal   Result Value Ref Range    Sed Rate 48 (H) 0 - 20 mm/h   CRP inflammation     Status: None   Result Value Ref Range    CRP Inflammation  <2.9 0.0 - 8.0 mg/L   Creatinine     Status: Abnormal   Result Value Ref Range    Creatinine 0.43 (L) 0.52 - 1.04 mg/dL    GFR Estimate >90 >60 mL/min/[1.73_m2]    GFR Estimate If Black >90 >60 mL/min/[1.73_m2]   CBC with platelets differential     Status: Abnormal   Result Value Ref Range    WBC 1.8 (L) 4.0 - 11.0 10e9/L    RBC Count 3.99 3.8 - 5.2 10e12/L    Hemoglobin 11.3 (L) 11.7 - 15.7 g/dL    Hematocrit 36.4 35.0 - 47.0 %    MCV 91 78 - 100 fl    MCH 28.3 26.5 - 33.0 pg    MCHC 31.0 (L) 31.5 - 36.5 g/dL    RDW 14.2 10.0 - 15.0 %    Platelet Count 148 (L) 150 - 450 10e9/L    Diff Method Automated Method     % Neutrophils 70.4 %    % Lymphocytes 22.5 %    % Monocytes 7.1 %    % Eosinophils 0.0 %    % Basophils 0.0 %    % Immature Granulocytes 0.0 %    Nucleated RBCs 0 0 /100    Absolute Neutrophil 1.3 (L) 1.6 - 8.3 10e9/L    Absolute Lymphocytes 0.4 (L) 0.8 - 5.3 10e9/L    Absolute Monocytes 0.1 0.0 - 1.3 10e9/L    Absolute Eosinophils 0.0 0.0 - 0.7 10e9/L    Absolute Basophils 0.0 0.0 - 0.2 10e9/L    Abs Immature Granulocytes 0.0 0 - 0.4 10e9/L    Absolute Nucleated RBC 0.0    AST     Status: None   Result Value Ref Range    AST 15 0 - 45 U/L   ALT     Status: None   Result Value Ref Range    ALT 11 0 - 50 U/L   Albumin level     Status: None   Result Value Ref Range    Albumin 3.6 3.4 - 5.0 g/dL   Creatinine urine calculation only     Status: None   Result Value Ref Range    Creatinine Urine 150 mg/dL       Ezio Irby MD

## 2020-02-05 LAB
C3 SERPL-MCNC: 73 MG/DL (ref 81–157)
C4 SERPL-MCNC: 7 MG/DL (ref 13–39)
DEPRECATED CALCIDIOL+CALCIFEROL SERPL-MC: 72 UG/L (ref 20–75)
DSDNA AB SER-ACNC: 32 IU/ML

## 2020-02-11 ENCOUNTER — MYC REFILL (OUTPATIENT)
Dept: RHEUMATOLOGY | Facility: CLINIC | Age: 25
End: 2020-02-11

## 2020-02-11 DIAGNOSIS — I73.01 RAYNAUD'S PHENOMENON WITH GANGRENE (H): ICD-10-CM

## 2020-02-11 DIAGNOSIS — M32.9 SYSTEMIC LUPUS ERYTHEMATOSUS, UNSPECIFIED SLE TYPE, UNSPECIFIED ORGAN INVOLVEMENT STATUS (H): ICD-10-CM

## 2020-02-11 RX ORDER — MYCOPHENOLATE MOFETIL 500 MG/1
1500 TABLET ORAL 2 TIMES DAILY
Qty: 180 TABLET | Refills: 3 | Status: CANCELLED | OUTPATIENT
Start: 2020-02-11

## 2020-02-11 RX ORDER — SILDENAFIL CITRATE 20 MG/1
20 TABLET ORAL 3 TIMES DAILY
Qty: 90 TABLET | Refills: 3 | Status: CANCELLED | OUTPATIENT
Start: 2020-02-11

## 2020-02-13 ENCOUNTER — OFFICE VISIT (OUTPATIENT)
Dept: RHEUMATOLOGY | Facility: CLINIC | Age: 25
End: 2020-02-13
Attending: INTERNAL MEDICINE
Payer: COMMERCIAL

## 2020-02-13 ENCOUNTER — OFFICE VISIT (OUTPATIENT)
Dept: DERMATOLOGY | Facility: CLINIC | Age: 25
End: 2020-02-13
Attending: DERMATOLOGY
Payer: COMMERCIAL

## 2020-02-13 ENCOUNTER — TELEPHONE (OUTPATIENT)
Dept: DERMATOLOGY | Facility: CLINIC | Age: 25
End: 2020-02-13

## 2020-02-13 ENCOUNTER — ALLIED HEALTH/NURSE VISIT (OUTPATIENT)
Dept: TRANSPLANT | Facility: CLINIC | Age: 25
End: 2020-02-13
Attending: INTERNAL MEDICINE
Payer: COMMERCIAL

## 2020-02-13 VITALS
BODY MASS INDEX: 19.16 KG/M2 | DIASTOLIC BLOOD PRESSURE: 71 MMHG | HEART RATE: 108 BPM | SYSTOLIC BLOOD PRESSURE: 104 MMHG | WEIGHT: 118.7 LBS

## 2020-02-13 VITALS
HEART RATE: 108 BPM | DIASTOLIC BLOOD PRESSURE: 71 MMHG | WEIGHT: 118.7 LBS | SYSTOLIC BLOOD PRESSURE: 104 MMHG | BODY MASS INDEX: 19.16 KG/M2

## 2020-02-13 DIAGNOSIS — T69.1XXA CHILBLAIN LUPUS ERYTHEMATOSUS, INITIAL ENCOUNTER: Primary | ICD-10-CM

## 2020-02-13 DIAGNOSIS — M79.604 PAIN IN BOTH LOWER EXTREMITIES: Primary | ICD-10-CM

## 2020-02-13 DIAGNOSIS — M32.19 SYSTEMIC LUPUS ERYTHEMATOSUS WITH OTHER ORGAN INVOLVEMENT, UNSPECIFIED SLE TYPE (H): ICD-10-CM

## 2020-02-13 DIAGNOSIS — I73.00 RAYNAUD'S PHENOMENON WITHOUT GANGRENE: ICD-10-CM

## 2020-02-13 DIAGNOSIS — M32.9 SYSTEMIC LUPUS ERYTHEMATOSUS (H): Primary | ICD-10-CM

## 2020-02-13 DIAGNOSIS — M79.605 PAIN IN BOTH LOWER EXTREMITIES: Primary | ICD-10-CM

## 2020-02-13 PROCEDURE — G0463 HOSPITAL OUTPT CLINIC VISIT: HCPCS | Mod: ZF

## 2020-02-13 PROCEDURE — 97802 MEDICAL NUTRITION INDIV IN: CPT | Mod: ZF | Performed by: DIETITIAN, REGISTERED

## 2020-02-13 RX ORDER — LIDOCAINE 5% 5 G/100G
1 CREAM TOPICAL 4 TIMES DAILY PRN
Qty: 30 G | Refills: 3 | Status: SHIPPED | OUTPATIENT
Start: 2020-02-13 | End: 2020-06-25

## 2020-02-13 RX ORDER — AMLODIPINE BESYLATE 5 MG/1
5 TABLET ORAL DAILY
Qty: 90 TABLET | Refills: 1 | Status: SHIPPED | OUTPATIENT
Start: 2020-02-13 | End: 2020-02-13

## 2020-02-13 RX ORDER — CLOBETASOL PROPIONATE 0.5 MG/G
CREAM TOPICAL 2 TIMES DAILY
Qty: 45 G | Refills: 0 | Status: SHIPPED | OUTPATIENT
Start: 2020-02-13 | End: 2020-06-25

## 2020-02-13 RX ORDER — AMLODIPINE BESYLATE 5 MG/1
5 TABLET ORAL DAILY
Qty: 90 TABLET | Refills: 1 | Status: SHIPPED | OUTPATIENT
Start: 2020-02-13 | End: 2020-07-27

## 2020-02-13 ASSESSMENT — PAIN SCALES - GENERAL
PAINLEVEL: MODERATE PAIN (4)
PAINLEVEL: MODERATE PAIN (4)

## 2020-02-13 NOTE — PROGRESS NOTES
Rheumatology Clinic F/U Visit    Last seen: 1/23/2020    DOS: 2/13/2020    Reason for evaluation: SLE    SUBJECTIVE:  The patient is a 24 yo woman with a hx of Graves disease s/p radioactive iodine ablation in 2013, post-ablation hypothyroidism, who was recently diagnosed with SLE in 11/2018 in a primary care setting presents to establish rheumatology care. She initially presented with raynaud's, rash over face and legs, nasal and oral ulcers, weight loss of 30 lbs, and severe fatigue. Was found to have Leukopenia, thrombocytopenia (130), sed rate 45, SHANDA 1:320, Sm>8, SSA>8, RNP>8, DS DNA 17, low C3 C4. She was started on hydroxychloroquine 300/day, prednisone 30 mg/day, and subsequently - verapamil for Raynauds. About 2 weeks after beginning medications, she developed lapses in memory - could not remember whole days, or an e-mail she had sent. Found herself driving to [place] for no clear reason, until she ran out of gas. She became concerned that these symptoms were due to new medications and stopped taking them. She started feeling more lucid soon after.    Patient denies any fevers, chills, vision changes, seizures, focal weakness or numbness.  Denies any arthralgias, morning stiffness, myalgias,  Alopecia, vitiligo, ear fullness or drainage.   No cough or dyspnea, pleuricy, no abdominal pain, nausea, diarrhea, no hematuria, no history of blood clots.         Today 1/23/2020: Last seen by Dr. Wiley in 2/2019, then transferred care to [facility] [place], now wants to re-establish here as it's closer to her school.    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    Her hair is growing back. Had oral/nasal ulcers before not recently.    No fatigue.    Her feet hurt, chronic, worse over past month. Pain gets worse towards the end of the day after walking, gets worse with driving.    No CP, SOB or cough.    Sometimes has mild nausea. Lost weight with Dx of lupus, but  gained some back, still has loss of appetite.    No headaches, seizures.    Has raynaud's, fingers turn red, white, blue. It causes numbness, tingling.    Reports having sores on the bottom of her feet. Has it since Nov 2019 with drop in T.      Currently off prednsione.    On  mg bid, had eye exam in 8/2019.    Off AZA, took it only for one month.    On cellcept about a yr.      Started norvasc 2.5 mg qd in 12/2019 and it has not helped with raynaud's.    Today 2/13/2020: At initial visit with me, cellcept was increased to 1.5 gr bid, revatio was added for feet raynaud's/chilblain. She is being seen by DR. Quinonez today for chill julia lesions over toes, also has upcoming appointment with dietitian for weight loss. Her biggest complaint is B/L leg myalgia.    Additional pertinent labs:    RF CCP neg  11/15 hypothyroid TSH 15  Marked hypokalemia  EBV IgG high, IgM neg Parvo B19 IgG high    Family hx negative for autoimmune disease, positive for diabetes and hypertension.      REVIEW OF SYMPTOMS:  A comprehensive ROS was done. Positives are per HPI.        HISTORY REVIEW:  Past Medical History:   Diagnosis Date     Hypothyroidism      Lupus (systemic lupus erythematosus) (H)      No past surgical history on file.  No family history on file.  Social History     Socioeconomic History     Marital status: Single     Spouse name: Not on file     Number of children: Not on file     Years of education: Not on file     Highest education level: Not on file   Occupational History     Not on file   Social Needs     Financial resource strain: Not on file     Food insecurity:     Worry: Not on file     Inability: Not on file     Transportation needs:     Medical: Not on file     Non-medical: Not on file   Tobacco Use     Smoking status: Never Smoker     Smokeless tobacco: Never Used   Substance and Sexual Activity     Alcohol use: No     Frequency: Never     Drug use: No     Sexual activity: Never   Lifestyle     Physical  activity:     Days per week: Not on file     Minutes per session: Not on file     Stress: Not on file   Relationships     Social connections:     Talks on phone: Not on file     Gets together: Not on file     Attends Zoroastrian service: Not on file     Active member of club or organization: Not on file     Attends meetings of clubs or organizations: Not on file     Relationship status: Not on file     Intimate partner violence:     Fear of current or ex partner: Not on file     Emotionally abused: Not on file     Physically abused: Not on file     Forced sexual activity: Not on file   Other Topics Concern     Not on file   Social History Narrative     Not on file     There is no problem list on file for this patient.    No Known Allergies    I reviewed the Current Medications:  Current Outpatient Medications   Medication Sig Dispense Refill     clobetasol (TEMOVATE) 0.05 % external ointment Apply topically 2 times daily To use over toe ulcers 30 g 0     hydroquinone (CRISTA) 4 % external cream Apply to face BID for 3 months then take 3 month break.       hydroxychloroquine (PLAQUENIL) 200 MG tablet Take 1 tablet (200 mg) by mouth 2 times daily 60 tablet 3     levothyroxine (SYNTHROID/LEVOTHROID) 75 MCG tablet Take 75 mcg by mouth daily       mycophenolate (GENERIC EQUIVALENT) 500 MG tablet Take 3 tablets (1,500 mg) by mouth 2 times daily 180 tablet 3     sildenafil (REVATIO) 20 MG tablet Take 1 tablet (20 mg) by mouth 3 times daily 90 tablet 3     tiZANidine (ZANAFLEX) 4 MG tablet Take 1 tablet (4 mg) by mouth 3 times daily as needed for muscle spasms 60 tablet 3     amLODIPine (NORVASC) 5 MG tablet Take 1 tablet (5 mg) by mouth daily 90 tablet 1     clobetasol (TEMOVATE) 0.05 % external cream Apply topically 2 times daily 45 g 0     Lidocaine 5 % CREA Externally apply 1 Application topically 4 times daily as needed (for pain) 30 g 3         I personally reviewed and independently visualized the following  images:  1/21/19  CT head without contrast.    COMPARISON:  None available     FINDINGS:  The ventricles are in proportion to the cortical sulci consistent with patient`s stated age. The gray-white matter junction is distinct. Negative for acute intracranial hemorrhage, extra-axial fluid collection or midline shift. The basal cisterns are intact.     There is mild mucosal thickening of the ethmoid sinuses. The remainder of the visualized paranasal sinuses are clear. The bony calvarium is intact.     IMPRESSION:  Unremarkable noncontrast head CT without acute intracranial abnormality.      Vitals: Blood pressure 104/71, pulse 108, weight 53.8 kg (118 lb 11.2 oz).      PHYSICAL EXAM  Constitutional: ill looking, NAD  Eyes: nl EOM, PERRLA, vision, conjunctiva, sclera  ENT: nl external ears, nose, hearing, lips, teeth, gums, throat  No mucous membrane lesions, normal saliva pool  Neck: no mass or thyroid enlargement  Resp: lungs clear to auscultation, nl to palpation  CV: RRR, no murmurs, rubs or gallops, no edema  GI: no ABD tenderness  : not tested  Lymph: no cervical, supraclavicular nodes  MS: no joint tenderness or active synovitis  Skin: purple discoloration of the toes with petechiae over toes  Neuro: nl cranial nerves, strength  Psych: nl  affect.      ASSESSMENT and PLAN:  SLE with weight loss, ongoing arthralgia, active lupus serology and what seems to be new chill julia lesions over toes. [place] [facility] [place], borderline pos anti-DNA and low C3/C4. Discussed Dx, Tx plan, labs.    Today plan of care:      Cotinue cellcept 3 tabs of 500 mg twice a day, was informed that it is teratogenic. Risks were discussed. Labs in 4 wk.then [id number]     Continue revatio for Raynaud's; risks were discussed    F/U with dietition    Will see Dr. uQinonez today     RTC 4wk rheum-derm

## 2020-02-13 NOTE — NURSING NOTE
Chief Complaint   Patient presents with     Consult     lupus     /71   Pulse 108   Wt 53.8 kg (118 lb 11.2 oz)   BMI 19.16 kg/m      Vitals taken from rheumatology visit.  Patti Miguel CMA  2/13/2020 11:17 AM

## 2020-02-13 NOTE — TELEPHONE ENCOUNTER
[facility] Call Center    Phone Message    May a detailed message be left on voicemail: yes     Reason for Call: Other: Pt requesting to schedule with both providers in 4 weeks. Please advise.     Action Taken: Message routed to:  [facility] ([facility]    Travel Screening: Not Applicable

## 2020-02-13 NOTE — LETTER
2/13/2020       RE: Yvonne Barron  [address]  [facility] [address]     Dear Colleague,    Thank you for referring your patient, Yvonne Barron, to the [facility] RHEUMATOLOGY at [facility]. Please see a copy of my visit note below.    Rheumatology Clinic F/U Visit    Last seen: 1/23/2020    DOS: 2/13/2020    Reason for evaluation: SLE    SUBJECTIVE:  The patient is a 22 yo woman with a hx of Graves disease s/p radioactive iodine ablation in 2013, post-ablation hypothyroidism, who was recently diagnosed with SLE in 11/2018 in a primary care setting presents to establish rheumatology care. She initially presented with raynaud's, rash over face and legs, nasal and oral ulcers, weight loss of 30 lbs, and severe fatigue. Was found to have Leukopenia, thrombocytopenia (130), sed rate 45, SHANDA 1:320, Sm>8, SSA>8, RNP>8, DS DNA 17, low C3 C4. She was started on hydroxychloroquine 300/day, prednisone 30 mg/day, and subsequently - verapamil for Raynauds. About 2 weeks after beginning medications, she developed lapses in memory - could not remember whole days, or an e-mail she had sent. Found herself driving to [place] for no clear reason, until she ran out of gas. She became concerned that these symptoms were due to new medications and stopped taking them. She started feeling more lucid soon after.    Patient denies any fevers, chills, vision changes, seizures, focal weakness or numbness.  Denies any arthralgias, morning stiffness, myalgias,  Alopecia, vitiligo, ear fullness or drainage.   No cough or dyspnea, pleuricy, no abdominal pain, nausea, diarrhea, no hematuria, no history of blood clots.         Today 1/23/2020: Last seen by Dr. Wiley in 2/2019, then transferred care to [facility], now wants to re-establish here as it's closer to her school.    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    Her hair is growing  back. Had oral/nasal ulcers before not recently.    No fatigue.    Her feet hurt, chronic, worse over past month. Pain gets worse towards the end of the day after walking, gets worse with driving.    No CP, SOB or cough.    Sometimes has mild nausea. Lost weight with Dx of lupus, but gained some back, still has loss of appetite.    No headaches, seizures.    Has raynaud's, fingers turn red, white, blue. It causes numbness, tingling.    Reports having sores on the bottom of her feet. Has it since Nov 2019 with drop in T.      Currently off prednsione.    On  mg bid, had eye exam in 8/2019.    Off AZA, took it only for one month.    On cellcept about a yr.      Started norvasc 2.5 mg qd in 12/2019 and it has not helped with raynaud's.    Today 2/13/2020: At initial visit with me, cellcept was increased to 1.5 gr bid, revatio was added for feet raynaud's/chilblain. She is being seen by DR. Quinonez today for chill julia lesions over toes, also has upcoming appointment with dietitian for weight loss. Her biggest complaint is B/L leg myalgia.    Additional pertinent labs:    RF CCP neg  11/15 hypothyroid TSH 15  Marked hypokalemia  EBV IgG high, IgM neg Parvo B19 IgG high    Family hx negative for autoimmune disease, positive for diabetes and hypertension.      REVIEW OF SYMPTOMS:  A comprehensive ROS was done. Positives are per HPI.        HISTORY REVIEW:  Past Medical History:   Diagnosis Date     Hypothyroidism      Lupus (systemic lupus erythematosus) (H)      No past surgical history on file.  No family history on file.  Social History     Socioeconomic History     Marital status: Single     Spouse name: Not on file     Number of children: Not on file     Years of education: Not on file     Highest education level: Not on file   Occupational History     Not on file   Social Needs     Financial resource strain: Not on file     Food insecurity:     Worry: Not on file     Inability: Not on file      Transportation needs:     Medical: Not on file     Non-medical: Not on file   Tobacco Use     Smoking status: Never Smoker     Smokeless tobacco: Never Used   Substance and Sexual Activity     Alcohol use: No     Frequency: Never     Drug use: No     Sexual activity: Never   Lifestyle     Physical activity:     Days per week: Not on file     Minutes per session: Not on file     Stress: Not on file   Relationships     Social connections:     Talks on phone: Not on file     Gets together: Not on file     Attends Hinduism service: Not on file     Active member of club or organization: Not on file     Attends meetings of clubs or organizations: Not on file     Relationship status: Not on file     Intimate partner violence:     Fear of current or ex partner: Not on file     Emotionally abused: Not on file     Physically abused: Not on file     Forced sexual activity: Not on file   Other Topics Concern     Not on file   Social History Narrative     Not on file     There is no problem list on file for this patient.    No Known Allergies    I reviewed the Current Medications:  Current Outpatient Medications   Medication Sig Dispense Refill     clobetasol (TEMOVATE) 0.05 % external ointment Apply topically 2 times daily To use over toe ulcers 30 g 0     hydroquinone (CRISTA) 4 % external cream Apply to face BID for 3 months then take 3 month break.       hydroxychloroquine (PLAQUENIL) 200 MG tablet Take 1 tablet (200 mg) by mouth 2 times daily 60 tablet 3     levothyroxine (SYNTHROID/LEVOTHROID) 75 MCG tablet Take 75 mcg by mouth daily       mycophenolate (GENERIC EQUIVALENT) 500 MG tablet Take 3 tablets (1,500 mg) by mouth 2 times daily 180 tablet 3     sildenafil (REVATIO) 20 MG tablet Take 1 tablet (20 mg) by mouth 3 times daily 90 tablet 3     tiZANidine (ZANAFLEX) 4 MG tablet Take 1 tablet (4 mg) by mouth 3 times daily as needed for muscle spasms 60 tablet 3     amLODIPine (NORVASC) 5 MG tablet Take 1 tablet (5 mg) by  mouth daily 90 tablet 1     clobetasol (TEMOVATE) 0.05 % external cream Apply topically 2 times daily 45 g 0     Lidocaine 5 % CREA Externally apply 1 Application topically 4 times daily as needed (for pain) 30 g 3         I personally reviewed and independently visualized the following images:  1/21/19  CT head without contrast.    COMPARISON:  None available     FINDINGS:  The ventricles are in proportion to the cortical sulci consistent with patient`s stated age. The gray-white matter junction is distinct. Negative for acute intracranial hemorrhage, extra-axial fluid collection or midline shift. The basal cisterns are intact.     There is mild mucosal thickening of the ethmoid sinuses. The remainder of the visualized paranasal sinuses are clear. The bony calvarium is intact.     IMPRESSION:  Unremarkable noncontrast head CT without acute intracranial abnormality.      Vitals: Blood pressure 104/71, pulse 108, weight 53.8 kg (118 lb 11.2 oz).      PHYSICAL EXAM  Constitutional: ill looking, NAD  Eyes: nl EOM, PERRLA, vision, conjunctiva, sclera  ENT: nl external ears, nose, hearing, lips, teeth, gums, throat  No mucous membrane lesions, normal saliva pool  Neck: no mass or thyroid enlargement  Resp: lungs clear to auscultation, nl to palpation  CV: RRR, no murmurs, rubs or gallops, no edema  GI: no ABD tenderness  : not tested  Lymph: no cervical, supraclavicular nodes  MS: no joint tenderness or active synovitis  Skin: purple discoloration of the toes with petechiae over toes  Neuro: nl cranial nerves, strength  Psych: nl  affect.      ASSESSMENT and PLAN:  SLE with weight loss, ongoing arthralgia, active lupus serology and what seems to be new chill julia lesions over toes. Labs in 12/2019 done at [facility] show leukopenia, borderline pos anti-DNA and low C3/C4. Discussed Dx, Tx plan, labs.    Today plan of care:      Cotinue cellcept 3 tabs of 500 mg twice a day, was informed that it is teratogenic. Risks were  discussed. Labs in 4 wk.then [id number]     Continue revatio for Raynaud's; risks were discussed    F/U with dietition    Will see Dr. Quinonez today     RTC 4wk rheum-derm        Again, thank you for allowing me to participate in the care of your patient.      Sincerely,    Ezio Irby MD

## 2020-02-13 NOTE — PROGRESS NOTES
"Outpatient MNT     Time Spent: 15 minutes  Visit Type: Initial  Referring Physician: Dr. Irby  Reason for RD Visit: Unintentional wt loss d/t loss of appetite and early satiety   Pt presented: alone    Medical dx associated with RD referral  Lupus     Nutrition Assessment  Appetite: Pt reports having a poor appetite and early satiety. 2-3 days per week she does not eat anything and on the days she does eats something, she \"can't even force herself to eat a full meat sandwich\". Pt avoids snacking as she \"tries to save her appetite for mealtime\".    Vitamins, Supplements, Pertinent Meds: Iron, Vit D3  Herbal Medicines/Supplements: None    Diet Recall  1 daily meal Chicken sandwich on whole wheat bread with lettuce, cucumber, tomatoes, and coombs   Snacks Rarely, chips   Beverages Water, 16 oz Gatorade daily, coffee    Alcohol None    Dining out 1 meal per week      Physical Activity  No designated exercise time but walks around the [facility] [employer] to get to her classes.     Anthropometrics  Height:   66 in   BMI:    19.2 kg/m2    Weight Status:Normal BMI   Weight:  119 lbs (54 kg)      Wt Readings from Last 10 Encounters:   02/13/20 53.8 kg (118 lb 11.2 oz)   02/13/20 53.8 kg (118 lb 11.2 oz)   01/23/20 55.3 kg (122 lb)   02/20/19 59.4 kg (131 lb)   02/14/19 61.9 kg (136 lb 6.4 oz)         IBW (lb): 130#  % IBW: 92%    Wt Hx: Pt has unintentionally lost 18# (13%) over the last year.     Dosing BW: 119 lbs/54 kg       Malnutrition  % Intake: </= 50% for >/= 5 days (severe malnutrition)  % Weight Loss: Up to 20% in 1 year (non-severe malnutrition)  Subcutaneous Fat Loss: None noted  Muscle Loss: None noted   Fluid Accumulation/Edema: None noted  Malnutrition Diagnosis: Severe malnutrition in the context of chronic illness.     Estimated Nutrition Needs  Energy  2698-7702+ kcal/day     (30-35+ kcal/kg for desired weight gain)     Protein  54-65 g/day     (1-1.2 g/kg for desired weight gain)         Fluid  1 ml/kcal or per " "MD     Nutrition Diagnosis  Unintended weight loss related to poor appetite and early satiety as evidenced by pt losing 18# (13%) over the last year.    Nutrition Intervention  1) Nutrition Education:  - Reviewed and provided the following handout: \"Suggestions for Increasing Calories and Protein\"  - Encouraged the pt to consume small, frequent meals to increase her protein/energy intake  - Provided pt with a list of protein drinks/bars/powders and encouraged her to consume BID to TID supplements    2) Interprofessional collaboration - RD worked with clinic  to determine if any protein supplements would be covered by insurance. Unfortunately none are.     Patient Understanding: Pt verbalized understanding of education provided.  Expected Compliance: Good   Follow-Up Plans: PRN     Nutrition Goals  1. Weight gain vs weight maintenance   2. Patient to consume % of estimated energy and protein needs between TID meals, nutrition supplements, and snacks.      Hazel Velarde RD, [facility] [phone]    "

## 2020-02-13 NOTE — PATIENT INSTRUCTIONS
Heated gloves (e.g. skyfox heated gloves with electrical re-chargeable batteries)   Start norvasc 5mg daily and continue sildenafil 20mg three times a day

## 2020-02-13 NOTE — LETTER
2/13/2020      RE: Yvonne Barron  [address]  [facility] [address]       Rheumatology Clinic F/U Visit    Last seen: 1/23/2020    DOS: 2/13/2020    Reason for evaluation: SLE    SUBJECTIVE:  The patient is a 24 yo woman with a hx of Graves disease s/p radioactive iodine ablation in 2013, post-ablation hypothyroidism, who was recently diagnosed with SLE in 11/2018 in a primary care setting presents to establish rheumatology care. She initially presented with raynaud's, rash over face and legs, nasal and oral ulcers, weight loss of 30 lbs, and severe fatigue. Was found to have Leukopenia, thrombocytopenia (130), sed rate 45, SHANDA 1:320, Sm>8, SSA>8, RNP>8, DS DNA 17, low C3 C4. She was started on hydroxychloroquine 300/day, prednisone 30 mg/day, and subsequently - verapamil for Raynauds. About 2 weeks after beginning medications, she developed lapses in memory - could not remember whole days, or an e-mail she had sent. Found herself driving to [place] for no clear reason, until she ran out of gas. She became concerned that these symptoms were due to new medications and stopped taking them. She started feeling more lucid soon after.    Patient denies any fevers, chills, vision changes, seizures, focal weakness or numbness.  Denies any arthralgias, morning stiffness, myalgias,  Alopecia, vitiligo, ear fullness or drainage.   No cough or dyspnea, pleuricy, no abdominal pain, nausea, diarrhea, no hematuria, no history of blood clots.         Today 1/23/2020: Last seen by Dr. Wiley in 2/2019, then transferred care to [facility], now wants to re-establish here as it's closer to her school.    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    Her hair is growing back. Had oral/nasal ulcers before not recently.    No fatigue.    Her feet hurt, chronic, worse over past month. Pain gets worse towards the end of the day after walking, gets worse with  driving.    No CP, SOB or cough.    Sometimes has mild nausea. Lost weight with Dx of lupus, but gained some back, still has loss of appetite.    No headaches, seizures.    Has raynaud's, fingers turn red, white, blue. It causes numbness, tingling.    Reports having sores on the bottom of her feet. Has it since Nov 2019 with drop in T.      Currently off prednsione.    On  mg bid, had eye exam in 8/2019.    Off AZA, took it only for one month.    On cellcept about a yr.      Started norvasc 2.5 mg qd in 12/2019 and it has not helped with raynaud's.    Today 2/13/2020: At initial visit with me, cellcept was increased to 1.5 gr bid, revatio was added for feet raynaud's/chilblain. She is being seen by DR. Quinonez today for chill julia lesions over toes, also has upcoming appointment with dietitian for weight loss. Her biggest complaint is B/L leg myalgia.    Additional pertinent labs:    RF CCP neg  11/15 hypothyroid TSH 15  Marked hypokalemia  EBV IgG high, IgM neg Parvo B19 IgG high    Family hx negative for autoimmune disease, positive for diabetes and hypertension.      REVIEW OF SYMPTOMS:  A comprehensive ROS was done. Positives are per HPI.        HISTORY REVIEW:  Past Medical History:   Diagnosis Date     Hypothyroidism      Lupus (systemic lupus erythematosus) (H)      No past surgical history on file.  No family history on file.  Social History     Socioeconomic History     Marital status: Single     Spouse name: Not on file     Number of children: Not on file     Years of education: Not on file     Highest education level: Not on file   Occupational History     Not on file   Social Needs     Financial resource strain: Not on file     Food insecurity:     Worry: Not on file     Inability: Not on file     Transportation needs:     Medical: Not on file     Non-medical: Not on file   Tobacco Use     Smoking status: Never Smoker     Smokeless tobacco: Never Used   Substance and Sexual Activity     Alcohol  use: No     Frequency: Never     Drug use: No     Sexual activity: Never   Lifestyle     Physical activity:     Days per week: Not on file     Minutes per session: Not on file     Stress: Not on file   Relationships     Social connections:     Talks on phone: Not on file     Gets together: Not on file     Attends Mormonism service: Not on file     Active member of club or organization: Not on file     Attends meetings of clubs or organizations: Not on file     Relationship status: Not on file     Intimate partner violence:     Fear of current or ex partner: Not on file     Emotionally abused: Not on file     Physically abused: Not on file     Forced sexual activity: Not on file   Other Topics Concern     Not on file   Social History Narrative     Not on file     There is no problem list on file for this patient.    No Known Allergies    I reviewed the Current Medications:  Current Outpatient Medications   Medication Sig Dispense Refill     clobetasol (TEMOVATE) 0.05 % external ointment Apply topically 2 times daily To use over toe ulcers 30 g 0     hydroquinone (CRISTA) 4 % external cream Apply to face BID for 3 months then take 3 month break.       hydroxychloroquine (PLAQUENIL) 200 MG tablet Take 1 tablet (200 mg) by mouth 2 times daily 60 tablet 3     levothyroxine (SYNTHROID/LEVOTHROID) 75 MCG tablet Take 75 mcg by mouth daily       mycophenolate (GENERIC EQUIVALENT) 500 MG tablet Take 3 tablets (1,500 mg) by mouth 2 times daily 180 tablet 3     sildenafil (REVATIO) 20 MG tablet Take 1 tablet (20 mg) by mouth 3 times daily 90 tablet 3     tiZANidine (ZANAFLEX) 4 MG tablet Take 1 tablet (4 mg) by mouth 3 times daily as needed for muscle spasms 60 tablet 3     amLODIPine (NORVASC) 5 MG tablet Take 1 tablet (5 mg) by mouth daily 90 tablet 1     clobetasol (TEMOVATE) 0.05 % external cream Apply topically 2 times daily 45 g 0     Lidocaine 5 % CREA Externally apply 1 Application topically 4 times daily as needed  (for pain) 30 g 3         I personally reviewed and independently visualized the following images:  1/21/19  CT head without contrast.    COMPARISON:  None available     FINDINGS:  The ventricles are in proportion to the cortical sulci consistent with patient`s stated age. The gray-white matter junction is distinct. Negative for acute intracranial hemorrhage, extra-axial fluid collection or midline shift. The basal cisterns are intact.     There is mild mucosal thickening of the ethmoid sinuses. The remainder of the visualized paranasal sinuses are clear. The bony calvarium is intact.     IMPRESSION:  Unremarkable noncontrast head CT without acute intracranial abnormality.      Vitals: Blood pressure 104/71, pulse 108, weight 53.8 kg (118 lb 11.2 oz).      PHYSICAL EXAM  Constitutional: ill looking, NAD  Eyes: nl EOM, PERRLA, vision, conjunctiva, sclera  ENT: nl external ears, nose, hearing, lips, teeth, gums, throat  No mucous membrane lesions, normal saliva pool  Neck: no mass or thyroid enlargement  Resp: lungs clear to auscultation, nl to palpation  CV: RRR, no murmurs, rubs or gallops, no edema  GI: no ABD tenderness  : not tested  Lymph: no cervical, supraclavicular nodes  MS: no joint tenderness or active synovitis  Skin: purple discoloration of the toes with petechiae over toes  Neuro: nl cranial nerves, strength  Psych: nl  affect.      ASSESSMENT and PLAN:  SLE with weight loss, ongoing arthralgia, active lupus serology and what seems to be new chill ujlia lesions over toes. Labs in 12/2019 done at [facility] show leukopenia, borderline pos anti-DNA and low C3/C4. Discussed Dx, Tx plan, labs.    Today plan of care:      Cotinue cellcept 3 tabs of 500 mg twice a day, was informed that it is teratogenic. Risks were discussed. Labs in 4 wk.then [id number]     Continue revatio for Raynaud's; risks were discussed    F/U with dietition    Will see Dr. Quinonez today     RTC 4wk rheum-derm        Masoudtojustyna Irby,  MD

## 2020-02-13 NOTE — NURSING NOTE
Chief Complaint   Patient presents with     RECHECK     2 weeks follow up     Blood pressure 104/71, pulse 108, weight 53.8 kg (118 lb 11.2 oz).    Pablo Wetzel/BRYANNA  February 13, 2020 10:59 AM

## 2020-02-13 NOTE — LETTER
"2020      RE: Yvonne Barron  [address]  [facility] [address]       [facility]  Dermatology-Rheumatology Clinic  Chico Quinonez MD  2020     Name: Yvonne Barron  MRN: [id number]  Age: 24 year old  : 1995  Referring provider: Ezio Irby    Dermatology Problem List:   1) Chilblains Lupus Erythematosus  2) Systemic lupus erythematosus  3) Graves disease s/p radioactive iodine ablation in , post-ablation hypothyroidism      HPI:   Yvonne Barron is a 24 year old female with a hx of Graves disease s/p radioactive iodine ablation in , post-ablation hypothyroidism, who was recently diagnosed with SLE in 2018 in a primary care setting who presents for evaluation of skin changes on her hands and feet.    She reports that her hands and feet have painful Raynaud color changes to white in the cold weather and in cold water. Twenty mg of prednisone has not been helpful in alleviating the pain in the past. She had facial swelling as a side effect. She is on 30mg TID of sildenafil daily. She has lived in [place] in the past, and has lived in [place] for two years. She is able to break her Raynaud's attacks in 20 minutes. She has been using Clobetasol on her fingers. She has been on Cellcept with a recent dose increase. She wants to avoid prednisone. She has pain with stepping on her feet even in the house when its not cold.     She reports a recent sensation of chest pressure (\"like a book on her chest\") when she is just sitting. She did have some occurrence of shortness of breath. This has since resolved. She will meet with a dietitian to discuss her nutrition.     She additionally has pain in her legs for which she had tried gabapentin but it did not help significantly. This pain affects her sleep.     Review of Systems:   Pertinent items are noted in HPI or as below, remainder of complete ROS is negative.      Active Medications:     Current Outpatient Medications: "      amLODIPine (NORVASC) 5 MG tablet, Take 1 tablet (5 mg) by mouth daily, Disp: 90 tablet, Rfl: 1     clobetasol (TEMOVATE) 0.05 % external cream, Apply topically 2 times daily, Disp: 45 g, Rfl: 0     Lidocaine 5 % CREA, Externally apply 1 Application topically 4 times daily as needed (for pain), Disp: 30 g, Rfl: 3     clobetasol (TEMOVATE) 0.05 % external ointment, Apply topically 2 times daily To use over toe ulcers, Disp: 30 g, Rfl: 0     hydroquinone (CRISTA) 4 % external cream, Apply to face BID for 3 months then take 3 month break., Disp: , Rfl:      hydroxychloroquine (PLAQUENIL) 200 MG tablet, Take 1 tablet (200 mg) by mouth 2 times daily, Disp: 60 tablet, Rfl: 3     levothyroxine (SYNTHROID/LEVOTHROID) 75 MCG tablet, Take 75 mcg by mouth daily, Disp: , Rfl:      mycophenolate (GENERIC EQUIVALENT) 500 MG tablet, Take 3 tablets (1,500 mg) by mouth 2 times daily, Disp: 180 tablet, Rfl: 3     sildenafil (REVATIO) 20 MG tablet, Take 1 tablet (20 mg) by mouth 3 times daily, Disp: 90 tablet, Rfl: 3     tiZANidine (ZANAFLEX) 4 MG tablet, Take 1 tablet (4 mg) by mouth 3 times daily as needed for muscle spasms, Disp: 60 tablet, Rfl: 3      Allergies:   Review of patient's allergies indicates no known allergies.        Past Medical History:  Hypothyroidism  Lupus     Past Surgical History:  The patient has no pertinent surgical history.     Family History:   The patient has no pertinent family history.        Social History:   Denies tobacco use. Denies alcohol use.       Physical Exam:   /71   Pulse 108   Wt 53.8 kg (118 lb 11.2 oz)   BMI 19.16 kg/m      Wt Readings from Last 4 Encounters:   02/13/20 53.8 kg (118 lb 11.2 oz)   02/13/20 53.8 kg (118 lb 11.2 oz)   01/23/20 55.3 kg (122 lb)   02/20/19 59.4 kg (131 lb)     General: Well-appearing female in no distress. Alert and oriented.   Skin: Focused exam of the reticular hands and feet  - Distal fingers and toes with reticular purpuric plaques and  scattered small ulcerations.    Laboratory:   RHEUM RESULTS Latest Ref Rng & Units 2/14/2019 2/4/2020   COMPLEMENT C3 81 - 157 mg/dL 29(L) 73(L)   COMPLEMENT C4 13 - 39 mg/dL 4(L) 7(L)   DNA-DS <10 IU/mL - 32(H)   SED RATE 0 - 20 mm/h - 48(H)   CRP, INFLAMMATION 0.0 - 8.0 mg/L - <2.9   CK TOTAL 30 - 225 U/L 30 -   AST 0 - 45 U/L 20 15   ALT 0 - 50 U/L 16 11   ALBUMIN 3.4 - 5.0 g/dL 3.4 3.6   WBC 4.0 - 11.0 10e9/L 2.2(L) 1.8(L)   RBC 3.8 - 5.2 10e12/L 4.12 3.99   HGB 11.7 - 15.7 g/dL 11.5(L) 11.3(L)   HCT 35.0 - 47.0 % 36.2 36.4   MCV 78 - 100 fl 88 91   MCHC 31.5 - 36.5 g/dL 31.8 31.0(L)   RDW 10.0 - 15.0 % 14.6 14.2    - 450 10e9/L 127(L) 148(L)   CREATININE 0.52 - 1.04 mg/dL 0.42(L) 0.43(L)   GFR ESTIMATE, IF BLACK >60 mL/min/[1.73:m2] >90 >90   GFR ESTIMATE >60 mL/min/[1.73:m2] >90 >90   HEPATITIS C ANTIBODY NR:Nonreactive Nonreactive -     DNA-ds   Date Value Ref Range Status   02/04/2020 32 (H) <10 IU/mL Final     Comment:     Positive   ,  ,   Beta 2 Glycoprotein 1 Antibody IgG   Date Value Ref Range Status   02/14/2019 1.4 <7 U/mL Final     Comment:     Negative     Beta 2 Glycoprotein 1 Antibody IgM   Date Value Ref Range Status   02/14/2019 <0.9 <7 U/mL Final     Comment:     Negative   ,   Cardiolipin Antibody IgG   Date Value Ref Range Status   02/14/2019 <1.6 0.0 - 19.9 GPL-U/mL Final     Comment:     Negative     Cardiolipin Antibody IgM   Date Value Ref Range Status   02/14/2019 0.2 0.0 - 19.9 MPL-U/mL Final     Comment:     Negative   ,  ,  ,  ,   Hep B Surface Agn   Date Value Ref Range Status   02/14/2019 Nonreactive NR^Nonreactive Final   ,  ,  ,  ,   Quantiferon-TB Gold Plus Result   Date Value Ref Range Status   02/14/2019 Negative NEG^Negative Final     Comment:     No interferon gamma response to M.tuberculosis antigens was detected.   Infection with M.tuberculosis is unlikely, however a single negative result   does not exclude infection. In patients at high risk for infection, a second    test should be considered  in accordance with the 2017 ATS/IDSA/CDC Clinical Practice Guidelines for   Diagnosis of Tuberculosis in Adults and Children [Edilberto FRANCE et   al.Clin.Infect.Dis. 2017 64(2):111-115].       TB1 Ag minus Nil Value   Date Value Ref Range Status   02/14/2019 0.01 IU/mL Final     TB2 Ag minus Nil Value   Date Value Ref Range Status   02/14/2019 0.00 IU/mL Final     Mitogen minus Nil Result   Date Value Ref Range Status   02/14/2019 2.65 IU/mL Final     Nil Result   Date Value Ref Range Status   02/14/2019 0.07 IU/mL Final     Assessment and Plan:   # Chilblain lupus erythematosus, initial encounter  # Raynaud's phenomenon without gangrene  Patient relates increased Raynaud's color changes associated with pain in her hands and feet. She is currently taking 30mg of sildenafil daily. She finds this insufficient. Her Raynaud's attacks last for less than 20 minutes. Chilnblains lesions present. We also discussed the use of even more aggressive hand warming, including the use of electric gloves.      We will add amlodipine 5mg daily to her current regimen of 30mg TID sildenafil. This would be a better option than restarting prednisone, as she found prednisone ineffective in the past at 20mg and she prefers not to restart prednisone. Will switch clobetasol 0.05% cream BID. We switched from gel to cream since she finds the gel sticky and inconvenient so she is less compliant with use of the topical during the day.     Patient relates foot pain with walking, particularly in her toes. For her foot pain, she will begin lidocaine 5 percent cream.     We will also recheck lupus anticoagulant:  Lupus anticoagulant panel, IgG and IgM, as well as beta 2 glycoprotein antibodies.     Orders:  - Lupus Anticoagulant Panel  - Cardiolipin Gisela IgG and IgM  - Beta 2 Glycoprotein Antibodies IGG IGM  - clobetasol (TEMOVATE) 0.05 % external cream  Dispense: 45 g; Refill: 0  - amLODIPine (NORVASC) 5 MG tablet   Dispense: 90 tablet; Refill: 1  - Lidocaine 5 % CREAM  Dispense: 30 g; Refill: 3    Follow-up: One month with Dr. Irby.      Scribe Disclosure:  I, Barby Wilder, am serving as a scribe to document services personally performed by Chico Quinonez MD at this visit, based upon the provider's statements to me. All documentation has been reviewed by the aforementioned provider prior to being entered into the official medical record.     Provider Disclosure:   The documentation recorded by the scribe accurately reflects the services I personally performed and the decisions made by me.    Chico Quinonez MD, FAAD    Departments of Internal Medicine and Dermatology  [facility]  [phone]        Chico Quinonez MD

## 2020-02-13 NOTE — LETTER
2/13/2020       RE: Yvonne Barron  [address]  [facility] [address]     Dear Colleague,    Thank you for referring your patient, Yvonne Barron, to the [facility] RHEUMATOLOGY at [facility]. Please see a copy of my visit note below.    Rheumatology Clinic F/U Visit    Last seen: 1/23/2020    DOS: 2/13/2020    Reason for evaluation: SLE    SUBJECTIVE:  The patient is a 24 yo woman with a hx of Graves disease s/p radioactive iodine ablation in 2013, post-ablation hypothyroidism, who was recently diagnosed with SLE in 11/2018 in a primary care setting presents to establish rheumatology care. She initially presented with raynaud's, rash over face and legs, nasal and oral ulcers, weight loss of 30 lbs, and severe fatigue. Was found to have Leukopenia, thrombocytopenia (130), sed rate 45, SHANDA 1:320, Sm>8, SSA>8, RNP>8, DS DNA 17, low C3 C4. She was started on hydroxychloroquine 300/day, prednisone 30 mg/day, and subsequently - verapamil for Raynauds. About 2 weeks after beginning medications, she developed lapses in memory - could not remember whole days, or an e-mail she had sent. Found herself driving to [place] for no clear reason, until she ran out of gas. She became concerned that these symptoms were due to new medications and stopped taking them. She started feeling more lucid soon after.    Patient denies any fevers, chills, vision changes, seizures, focal weakness or numbness.  Denies any arthralgias, morning stiffness, myalgias,  Alopecia, vitiligo, ear fullness or drainage.   No cough or dyspnea, pleuricy, no abdominal pain, nausea, diarrhea, no hematuria, no history of blood clots.         Today 1/23/2020: Last seen by Dr. Wiley in 2/2019, then [place] [facility]establish here as it's closer to her school.    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    Her hair is growing  back. Had oral/nasal ulcers before not recently.    No fatigue.    Her feet hurt, chronic, worse over past month. Pain gets worse towards the end of the day after walking, gets worse with driving.    No CP, SOB or cough.    Sometimes has mild nausea. Lost weight with Dx of lupus, but gained some back, still has loss of appetite.    No headaches, seizures.    Has raynaud's, fingers turn red, white, blue. It causes numbness, tingling.    Reports having sores on the bottom of her feet. Has it since Nov 2019 with drop in T.      Currently off prednsione.    On  mg bid, had eye exam in 8/2019.    Off AZA, took it only for one month.    On cellcept about a yr.      Started norvasc 2.5 mg qd in 12/2019 and it has not helped with raynaud's.    Today 2/13/2020: At initial visit with me, cellcept was increased to 1.5 gr bid, revatio was added for feet raynaud's/chilblain. She is being seen by DR. Quinonez today for chill julia lesions over toes, also has upcoming appointment with dietitian for weight loss. Her biggest complaint is B/L leg myalgia.    Additional pertinent labs:    RF CCP neg  11/15 hypothyroid TSH 15  Marked hypokalemia  EBV IgG high, IgM neg Parvo B19 IgG high    Family hx negative for autoimmune disease, positive for diabetes and hypertension.      REVIEW OF SYMPTOMS:  A comprehensive ROS was done. Positives are per HPI.        HISTORY REVIEW:  Past Medical History:   Diagnosis Date     Hypothyroidism      Lupus (systemic lupus erythematosus) (H)      No past surgical history on file.  No family history on file.  Social History     Socioeconomic History     Marital status: Single     Spouse name: Not on file     Number of children: Not on file     Years of education: Not on file     Highest education level: Not on file   Occupational History     Not on file   Social Needs     Financial resource strain: Not on file     Food insecurity:     Worry: Not on file     Inability: Not on file      Transportation needs:     Medical: Not on file     Non-medical: Not on file   Tobacco Use     Smoking status: Never Smoker     Smokeless tobacco: Never Used   Substance and Sexual Activity     Alcohol use: No     Frequency: Never     Drug use: No     Sexual activity: Never   Lifestyle     Physical activity:     Days per week: Not on file     Minutes per session: Not on file     Stress: Not on file   Relationships     Social connections:     Talks on phone: Not on file     Gets together: Not on file     Attends Jain service: Not on file     Active member of club or organization: Not on file     Attends meetings of clubs or organizations: Not on file     Relationship status: Not on file     Intimate partner violence:     Fear of current or ex partner: Not on file     Emotionally abused: Not on file     Physically abused: Not on file     Forced sexual activity: Not on file   Other Topics Concern     Not on file   Social History Narrative     Not on file     There is no problem list on file for this patient.    No Known Allergies    I reviewed the Current Medications:  Current Outpatient Medications   Medication Sig Dispense Refill     clobetasol (TEMOVATE) 0.05 % external ointment Apply topically 2 times daily To use over toe ulcers 30 g 0     hydroquinone (CRISTA) 4 % external cream Apply to face BID for 3 months then take 3 month break.       hydroxychloroquine (PLAQUENIL) 200 MG tablet Take 1 tablet (200 mg) by mouth 2 times daily 60 tablet 3     levothyroxine (SYNTHROID/LEVOTHROID) 75 MCG tablet Take 75 mcg by mouth daily       mycophenolate (GENERIC EQUIVALENT) 500 MG tablet Take 3 tablets (1,500 mg) by mouth 2 times daily 180 tablet 3     sildenafil (REVATIO) 20 MG tablet Take 1 tablet (20 mg) by mouth 3 times daily 90 tablet 3     tiZANidine (ZANAFLEX) 4 MG tablet Take 1 tablet (4 mg) by mouth 3 times daily as needed for muscle spasms 60 tablet 3     amLODIPine (NORVASC) 5 MG tablet Take 1 tablet (5 mg) by  mouth daily 90 tablet 1     clobetasol (TEMOVATE) 0.05 % external cream Apply topically 2 times daily 45 g 0     Lidocaine 5 % CREA Externally apply 1 Application topically 4 times daily as needed (for pain) 30 g 3         I personally reviewed and independently visualized the following images:  1/21/19  CT head without contrast.    COMPARISON:  None available     FINDINGS:  The ventricles are in proportion to the cortical sulci consistent with patient`s stated age. The gray-white matter junction is distinct. Negative for acute intracranial hemorrhage, extra-axial fluid collection or midline shift. The basal cisterns are intact.     There is mild mucosal thickening of the ethmoid sinuses. The remainder of the visualized paranasal sinuses are clear. The bony calvarium is intact.     IMPRESSION:  Unremarkable noncontrast head CT without acute intracranial abnormality.      Vitals: Blood pressure 104/71, pulse 108, weight 53.8 kg (118 lb 11.2 oz).      PHYSICAL EXAM  Constitutional: ill looking, NAD  Eyes: nl EOM, PERRLA, vision, conjunctiva, sclera  ENT: nl external ears, nose, hearing, lips, teeth, gums, throat  No mucous membrane lesions, normal saliva pool  Neck: no mass or thyroid enlargement  Resp: lungs clear to auscultation, nl to palpation  CV: RRR, no murmurs, rubs or gallops, no edema  GI: no ABD tenderness  : not tested  Lymph: no cervical, supraclavicular nodes  MS: no joint tenderness or active synovitis  Skin: purple discoloration of the toes with petechiae over toes  Neuro: nl cranial nerves, strength  Psych: nl  affect.      ASSESSMENT and PLAN:  SLE with weight loss, ongoing arthralgia, active lupus serology and what seems to be new chill julia lesions over toes. [place] [facility] [place], borderline pos anti-DNA and low C3/C4. Discussed Dx, Tx plan, labs.    Today plan of care:    Cotinue cellcept 3 tabs of 500 mg twice a day, was informed that it is teratogenic. Risks were  discussed. Labs in 4 wk.then q0umpycy     Continue revatio for Raynaud's; risks were discussed    F/U with dietition    Will see Dr. Quinonez today     RTC 4wk rheum-derm    Ezio Irby MD

## 2020-02-13 NOTE — LETTER
"2020       RE: Yvonne Barron  [address]  [facility] [address]     Dear Colleague,    Thank you for referring your patient, Yvonne Barron, to the [facility] RHEUMATOLOGY at [facility]. Please see a copy of my visit note below.    [facility]  Dermatology-Rheumatology Clinic  Chico Quinonez MD  2020     Name: Yvonne Barron  MRN: [id number]  Age: 24 year old  : 1995  Referring provider: Ezio Irby    Dermatology Problem List:   1) Chilblains Lupus Erythematosus  2) Systemic lupus erythematosus  3) Graves disease s/p radioactive iodine ablation in , post-ablation hypothyroidism      HPI:   Yvonne Barron is a 24 year old female with a hx of Graves disease s/p radioactive iodine ablation in , post-ablation hypothyroidism, who was recently diagnosed with SLE in 2018 in a primary care setting who presents for evaluation of skin changes on her hands and feet.    She reports that her hands and feet have painful Raynaud color changes to white in the cold weather and in cold water. Twenty mg of prednisone has not been helpful in alleviating the pain in the past. She had facial swelling as a side effect. She is on 30mg TID of sildenafil daily. She has lived in [place] in the past, and has lived in [place] for two years. She is able to break her Raynaud's attacks in 20 minutes. She has been using Clobetasol on her fingers. She has been on Cellcept with a recent dose increase. She wants to avoid prednisone. She has pain with stepping on her feet even in the house when its not cold.     She reports a recent sensation of chest pressure (\"like a book on her chest\") when she is just sitting. She did have some occurrence of shortness of breath. This has since resolved. She will meet with a dietitian to discuss her nutrition.     She additionally has pain in her legs for which she had tried gabapentin but it did not help significantly. " This pain affects her sleep.     Review of Systems:   Pertinent items are noted in HPI or as below, remainder of complete ROS is negative.      Active Medications:     Current Outpatient Medications:      amLODIPine (NORVASC) 5 MG tablet, Take 1 tablet (5 mg) by mouth daily, Disp: 90 tablet, Rfl: 1     clobetasol (TEMOVATE) 0.05 % external cream, Apply topically 2 times daily, Disp: 45 g, Rfl: 0     Lidocaine 5 % CREA, Externally apply 1 Application topically 4 times daily as needed (for pain), Disp: 30 g, Rfl: 3     clobetasol (TEMOVATE) 0.05 % external ointment, Apply topically 2 times daily To use over toe ulcers, Disp: 30 g, Rfl: 0     hydroquinone (CRISTA) 4 % external cream, Apply to face BID for 3 months then take 3 month break., Disp: , Rfl:      hydroxychloroquine (PLAQUENIL) 200 MG tablet, Take 1 tablet (200 mg) by mouth 2 times daily, Disp: 60 tablet, Rfl: 3     levothyroxine (SYNTHROID/LEVOTHROID) 75 MCG tablet, Take 75 mcg by mouth daily, Disp: , Rfl:      mycophenolate (GENERIC EQUIVALENT) 500 MG tablet, Take 3 tablets (1,500 mg) by mouth 2 times daily, Disp: 180 tablet, Rfl: 3     sildenafil (REVATIO) 20 MG tablet, Take 1 tablet (20 mg) by mouth 3 times daily, Disp: 90 tablet, Rfl: 3     tiZANidine (ZANAFLEX) 4 MG tablet, Take 1 tablet (4 mg) by mouth 3 times daily as needed for muscle spasms, Disp: 60 tablet, Rfl: 3      Allergies:   Review of patient's allergies indicates no known allergies.        Past Medical History:  Hypothyroidism  Lupus     Past Surgical History:  The patient has no pertinent surgical history.     Family History:   The patient has no pertinent family history.        Social History:   Denies tobacco use. Denies alcohol use.       Physical Exam:   /71   Pulse 108   Wt 53.8 kg (118 lb 11.2 oz)   BMI 19.16 kg/m      Wt Readings from Last 4 Encounters:   02/13/20 53.8 kg (118 lb 11.2 oz)   02/13/20 53.8 kg (118 lb 11.2 oz)   01/23/20 55.3 kg (122 lb)   02/20/19 59.4 kg  (131 lb)     General: Well-appearing female in no distress. Alert and oriented.   Skin: Focused exam of the reticular hands and feet  - Distal fingers and toes with reticular purpuric plaques and scattered small ulcerations.    Laboratory:   RHEUM RESULTS Latest Ref Rng & Units 2/14/2019 2/4/2020   COMPLEMENT C3 81 - 157 mg/dL 29(L) 73(L)   COMPLEMENT C4 13 - 39 mg/dL 4(L) 7(L)   DNA-DS <10 IU/mL - 32(H)   SED RATE 0 - 20 mm/h - 48(H)   CRP, INFLAMMATION 0.0 - 8.0 mg/L - <2.9   CK TOTAL 30 - 225 U/L 30 -   AST 0 - 45 U/L 20 15   ALT 0 - 50 U/L 16 11   ALBUMIN 3.4 - 5.0 g/dL 3.4 3.6   WBC 4.0 - 11.0 10e9/L 2.2(L) 1.8(L)   RBC 3.8 - 5.2 10e12/L 4.12 3.99   HGB 11.7 - 15.7 g/dL 11.5(L) 11.3(L)   HCT 35.0 - 47.0 % 36.2 36.4   MCV 78 - 100 fl 88 91   MCHC 31.5 - 36.5 g/dL 31.8 31.0(L)   RDW 10.0 - 15.0 % 14.6 14.2    - 450 10e9/L 127(L) 148(L)   CREATININE 0.52 - 1.04 mg/dL 0.42(L) 0.43(L)   GFR ESTIMATE, IF BLACK >60 mL/min/[1.73:m2] >90 >90   GFR ESTIMATE >60 mL/min/[1.73:m2] >90 >90   HEPATITIS C ANTIBODY NR:Nonreactive Nonreactive -     DNA-ds   Date Value Ref Range Status   02/04/2020 32 (H) <10 IU/mL Final     Comment:     Positive   ,  ,   Beta 2 Glycoprotein 1 Antibody IgG   Date Value Ref Range Status   02/14/2019 1.4 <7 U/mL Final     Comment:     Negative     Beta 2 Glycoprotein 1 Antibody IgM   Date Value Ref Range Status   02/14/2019 <0.9 <7 U/mL Final     Comment:     Negative   ,   Cardiolipin Antibody IgG   Date Value Ref Range Status   02/14/2019 <1.6 0.0 - 19.9 GPL-U/mL Final     Comment:     Negative     Cardiolipin Antibody IgM   Date Value Ref Range Status   02/14/2019 0.2 0.0 - 19.9 MPL-U/mL Final     Comment:     Negative   ,  ,  ,  ,   Hep B Surface Agn   Date Value Ref Range Status   02/14/2019 Nonreactive NR^Nonreactive Final   ,  ,  ,  ,   Quantiferon-TB Gold Plus Result   Date Value Ref Range Status   02/14/2019 Negative NEG^Negative Final     Comment:     No interferon gamma response  to M.tuberculosis antigens was detected.   Infection with M.tuberculosis is unlikely, however a single negative result   does not exclude infection. In patients at high risk for infection, a second   test should be considered  in accordance with the 2017 ATS/IDSA/CDC Clinical Practice Guidelines for   Diagnosis of Tuberculosis in Adults and Children [Edilberto [place] et   al.Clin.Infect.Dis. 2017 64(2):111-115].       TB1 Ag minus Nil Value   Date Value Ref Range Status   02/14/2019 0.01 IU/mL Final     TB2 Ag minus Nil Value   Date Value Ref Range Status   02/14/2019 0.00 IU/mL Final     Mitogen minus Nil Result   Date Value Ref Range Status   02/14/2019 2.65 IU/mL Final     Nil Result   Date Value Ref Range Status   02/14/2019 0.07 IU/mL Final     Assessment and Plan:   # Chilblain lupus erythematosus, initial encounter  # Raynaud's phenomenon without gangrene  Patient relates increased Raynaud's color changes associated with pain in her hands and feet. She is currently taking 30mg of sildenafil daily. She finds this insufficient. Her Raynaud's attacks last for less than 20 minutes. Chilnblains lesions present. We also discussed the use of even more aggressive hand warming, including the use of electric gloves.      We will add amlodipine 5mg daily to her current regimen of 30mg TID sildenafil. This would be a better option than restarting prednisone, as she found prednisone ineffective in the past at 20mg and she prefers not to restart prednisone. Will switch clobetasol 0.05% cream BID. We switched from gel to cream since she finds the gel sticky and inconvenient so she is less compliant with use of the topical during the day.     Patient relates foot pain with walking, particularly in her toes. For her foot pain, she will begin lidocaine 5 percent cream.     We will also recheck lupus anticoagulant:  Lupus anticoagulant panel, IgG and IgM, as well as beta 2 glycoprotein antibodies.     Orders:  - Lupus Anticoagulant  Panel  - Cardiolipin Gisela IgG and IgM  - Beta 2 Glycoprotein Antibodies IGG IGM  - clobetasol (TEMOVATE) 0.05 % external cream  Dispense: 45 g; Refill: 0  - amLODIPine (NORVASC) 5 MG tablet  Dispense: 90 tablet; Refill: 1  - Lidocaine 5 % CREAM  Dispense: 30 g; Refill: 3    Follow-up: One month with Dr. Irby.      Scribe Disclosure:  I, Barby Wilder, am serving as a scribe to document services personally performed by Chico Quinonez MD at this visit, based upon the provider's statements to me. All documentation has been reviewed by the aforementioned provider prior to being entered into the official medical record.     Provider Disclosure:   The documentation recorded by the scribe accurately reflects the services I personally performed and the decisions made by me.    Chico Quinonez MD, FAAD    Departments of Internal Medicine and Dermatology  [facility]  [phone]

## 2020-02-13 NOTE — PROGRESS NOTES
"[facility]  Dermatology-Rheumatology Clinic  Chico Quinonez MD  2020     Name: Yvonne Barron  MRN: [id number]  Age: 24 year old  : 1995  Referring provider: Ezio Irby    Dermatology Problem List:   1) Chilblains Lupus Erythematosus  2) Systemic lupus erythematosus  3) Graves disease s/p radioactive iodine ablation in , post-ablation hypothyroidism      HPI:   Yvonne Barron is a 24 year old female with a hx of Graves disease s/p radioactive iodine ablation in , post-ablation hypothyroidism, who was recently diagnosed with SLE in 2018 in a primary care setting who presents for evaluation of skin changes on her hands and feet.    She reports that her hands and feet have painful Raynaud color changes to white in the cold weather and in cold water. Twenty mg of prednisone has not been helpful in alleviating the pain in the past. She had facial swelling as a side effect. She is on 30mg TID of sildenafil daily. She has lived in [place] in the past, and has lived in [place] for two years. She is able to break her Raynaud's attacks in 20 minutes. She has been using Clobetasol on her fingers. She has been on Cellcept with a recent dose increase. She wants to avoid prednisone. She has pain with stepping on her feet even in the house when its not cold.     She reports a recent sensation of chest pressure (\"like a book on her chest\") when she is just sitting. She did have some occurrence of shortness of breath. This has since resolved. She will meet with a dietitian to discuss her nutrition.     She additionally has pain in her legs for which she had tried gabapentin but it did not help significantly. This pain affects her sleep.     Review of Systems:   Pertinent items are noted in HPI or as below, remainder of complete ROS is negative.      Active Medications:     Current Outpatient Medications:      amLODIPine (NORVASC) 5 MG tablet, Take 1 tablet (5 mg) by mouth daily, Disp: 90 " tablet, Rfl: 1     clobetasol (TEMOVATE) 0.05 % external cream, Apply topically 2 times daily, Disp: 45 g, Rfl: 0     Lidocaine 5 % CREA, Externally apply 1 Application topically 4 times daily as needed (for pain), Disp: 30 g, Rfl: 3     clobetasol (TEMOVATE) 0.05 % external ointment, Apply topically 2 times daily To use over toe ulcers, Disp: 30 g, Rfl: 0     hydroquinone (CRISTA) 4 % external cream, Apply to face BID for 3 months then take 3 month break., Disp: , Rfl:      hydroxychloroquine (PLAQUENIL) 200 MG tablet, Take 1 tablet (200 mg) by mouth 2 times daily, Disp: 60 tablet, Rfl: 3     levothyroxine (SYNTHROID/LEVOTHROID) 75 MCG tablet, Take 75 mcg by mouth daily, Disp: , Rfl:      mycophenolate (GENERIC EQUIVALENT) 500 MG tablet, Take 3 tablets (1,500 mg) by mouth 2 times daily, Disp: 180 tablet, Rfl: 3     sildenafil (REVATIO) 20 MG tablet, Take 1 tablet (20 mg) by mouth 3 times daily, Disp: 90 tablet, Rfl: 3     tiZANidine (ZANAFLEX) 4 MG tablet, Take 1 tablet (4 mg) by mouth 3 times daily as needed for muscle spasms, Disp: 60 tablet, Rfl: 3      Allergies:   Review of patient's allergies indicates no known allergies.        Past Medical History:  Hypothyroidism  Lupus     Past Surgical History:  The patient has no pertinent surgical history.     Family History:   The patient has no pertinent family history.        Social History:   Denies tobacco use. Denies alcohol use.       Physical Exam:   /71   Pulse 108   Wt 53.8 kg (118 lb 11.2 oz)   BMI 19.16 kg/m     Wt Readings from Last 4 Encounters:   02/13/20 53.8 kg (118 lb 11.2 oz)   02/13/20 53.8 kg (118 lb 11.2 oz)   01/23/20 55.3 kg (122 lb)   02/20/19 59.4 kg (131 lb)     General: Well-appearing female in no distress. Alert and oriented.   Skin: Focused exam of the reticular hands and feet  - Distal fingers and toes with reticular purpuric plaques and scattered small ulcerations.    Laboratory:   RHEUM RESULTS Latest Ref Rng & Units 2/14/2019  2/4/2020   COMPLEMENT C3 81 - 157 mg/dL 29(L) 73(L)   COMPLEMENT C4 13 - 39 mg/dL 4(L) 7(L)   DNA-DS <10 IU/mL - 32(H)   SED RATE 0 - 20 mm/h - 48(H)   CRP, INFLAMMATION 0.0 - 8.0 mg/L - <2.9   CK TOTAL 30 - 225 U/L 30 -   AST 0 - 45 U/L 20 15   ALT 0 - 50 U/L 16 11   ALBUMIN 3.4 - 5.0 g/dL 3.4 3.6   WBC 4.0 - 11.0 10e9/L 2.2(L) 1.8(L)   RBC 3.8 - 5.2 10e12/L 4.12 3.99   HGB 11.7 - 15.7 g/dL 11.5(L) 11.3(L)   HCT 35.0 - 47.0 % 36.2 36.4   MCV 78 - 100 fl 88 91   MCHC 31.5 - 36.5 g/dL 31.8 31.0(L)   RDW 10.0 - 15.0 % 14.6 14.2    - 450 10e9/L 127(L) 148(L)   CREATININE 0.52 - 1.04 mg/dL 0.42(L) 0.43(L)   GFR ESTIMATE, IF BLACK >60 mL/min/[1.73:m2] >90 >90   GFR ESTIMATE >60 mL/min/[1.73:m2] >90 >90   HEPATITIS C ANTIBODY NR:Nonreactive Nonreactive -     DNA-ds   Date Value Ref Range Status   02/04/2020 32 (H) <10 IU/mL Final     Comment:     Positive   ,  ,   Beta 2 Glycoprotein 1 Antibody IgG   Date Value Ref Range Status   02/14/2019 1.4 <7 U/mL Final     Comment:     Negative     Beta 2 Glycoprotein 1 Antibody IgM   Date Value Ref Range Status   02/14/2019 <0.9 <7 U/mL Final     Comment:     Negative   ,   Cardiolipin Antibody IgG   Date Value Ref Range Status   02/14/2019 <1.6 0.0 - 19.9 GPL-U/mL Final     Comment:     Negative     Cardiolipin Antibody IgM   Date Value Ref Range Status   02/14/2019 0.2 0.0 - 19.9 MPL-U/mL Final     Comment:     Negative   ,  ,  ,  ,   Hep B Surface Agn   Date Value Ref Range Status   02/14/2019 Nonreactive NR^Nonreactive Final   ,  ,  ,  ,   Quantiferon-TB Gold Plus Result   Date Value Ref Range Status   02/14/2019 Negative NEG^Negative Final     Comment:     No interferon gamma response to M.tuberculosis antigens was detected.   Infection with M.tuberculosis is unlikely, however a single negative result   does not exclude infection. In patients at high risk for infection, a second   test should be considered  in accordance with the 2017 ATS/IDSA/CDC Clinical Practice  Guidelines for   Diagnosis of Tuberculosis in Adults and Children [Edilberto [place] et   al.Clin.Infect.Dis. 2017 64(2):111-115].       TB1 Ag minus Nil Value   Date Value Ref Range Status   02/14/2019 0.01 IU/mL Final     TB2 Ag minus Nil Value   Date Value Ref Range Status   02/14/2019 0.00 IU/mL Final     Mitogen minus Nil Result   Date Value Ref Range Status   02/14/2019 2.65 IU/mL Final     Nil Result   Date Value Ref Range Status   02/14/2019 0.07 IU/mL Final     Assessment and Plan:   # Chilblain lupus erythematosus, initial encounter  # Raynaud's phenomenon without gangrene  Patient relates increased Raynaud's color changes associated with pain in her hands and feet. She is currently taking 30mg of sildenafil daily. She finds this insufficient. Her Raynaud's attacks last for less than 20 minutes. Chilnblains lesions present. We also discussed the use of even more aggressive hand warming, including the use of electric gloves.      We will add amlodipine 5mg daily to her current regimen of 30mg TID sildenafil. This would be a better option than restarting prednisone, as she found prednisone ineffective in the past at 20mg and she prefers not to restart prednisone. Will switch clobetasol 0.05% cream BID. We switched from gel to cream since she finds the gel sticky and inconvenient so she is less compliant with use of the topical during the day.     Patient relates foot pain with walking, particularly in her toes. For her foot pain, she will begin lidocaine 5 percent cream.     We will also recheck lupus anticoagulant:  Lupus anticoagulant panel, IgG and IgM, as well as beta 2 glycoprotein antibodies.     Orders:  - Lupus Anticoagulant Panel  - Cardiolipin Gisela IgG and IgM  - Beta 2 Glycoprotein Antibodies IGG IGM  - clobetasol (TEMOVATE) 0.05 % external cream  Dispense: 45 g; Refill: 0  - amLODIPine (NORVASC) 5 MG tablet  Dispense: 90 tablet; Refill: 1  - Lidocaine 5 % CREAM  Dispense: 30 g; Refill: 3    Follow-up:  One month with Dr. Irby.      Scribe Disclosure:  I, Barby Wilder, am serving as a scribe to document services personally performed by Chico Quinonez MD at this visit, based upon the provider's statements to me. All documentation has been reviewed by the aforementioned provider prior to being entered into the official medical record.     Provider Disclosure:   The documentation recorded by the scribe accurately reflects the services I personally performed and the decisions made by me.    Chico Quinonez MD, FAAD    Departments of Internal Medicine and Dermatology  [facility]  [phone]

## 2020-02-13 NOTE — LETTER
"2020       RE: Yvonne Barron  [address]  [facility] [place] [address]     Dear Colleague,    Thank you for referring your patient, Yvonne Barron, to the [facility] RHEUMATOLOGY at [facility]. Please see a copy of my visit note below.    [facility]  Dermatology-Rheumatology Clinic  Chico Quinonez MD  2020     Name: Yvonne Barron  MRN: [id number]  Age: 24 year old  : 1995  Referring provider: Ezio Irby    Dermatology Problem List:   1) Chilblains Lupus Erythematosus  2) Systemic lupus erythematosus  3) Graves disease s/p radioactive iodine ablation in , post-ablation hypothyroidism      HPI:   Yvonne Barron is a 24 year old female with a hx of Graves disease s/p radioactive iodine ablation in , post-ablation hypothyroidism, who was recently diagnosed with SLE in 2018 in a primary care setting who presents for evaluation of skin changes on her hands and feet.    She reports that her hands and feet have painful Raynaud color changes to white in the cold weather and in cold water. Twenty mg of prednisone has not been helpful in alleviating the pain in the past. She had facial swelling as a side effect. She is on 30mg TID of sildenafil daily. She has lived in [place] in the past, and has lived in [place] for two years. She is able to break her Raynaud's attacks in 20 minutes. She has been using Clobetasol on her fingers. She has been on Cellcept with a recent dose increase. She wants to avoid prednisone. She has pain with stepping on her feet even in the house when its not cold.     She reports a recent sensation of chest pressure (\"like a book on her chest\") when she is just sitting. She did have some occurrence of shortness of breath. This has since resolved. She will meet with a dietitian to discuss her nutrition.     She additionally has pain in her legs for which she had tried gabapentin but it did not help significantly. " This pain affects her sleep.     Review of Systems:   Pertinent items are noted in HPI or as below, remainder of complete ROS is negative.      Active Medications:     Current Outpatient Medications:      amLODIPine (NORVASC) 5 MG tablet, Take 1 tablet (5 mg) by mouth daily, Disp: 90 tablet, Rfl: 1     clobetasol (TEMOVATE) 0.05 % external cream, Apply topically 2 times daily, Disp: 45 g, Rfl: 0     Lidocaine 5 % CREA, Externally apply 1 Application topically 4 times daily as needed (for pain), Disp: 30 g, Rfl: 3     clobetasol (TEMOVATE) 0.05 % external ointment, Apply topically 2 times daily To use over toe ulcers, Disp: 30 g, Rfl: 0     hydroquinone (CRISTA) 4 % external cream, Apply to face BID for 3 months then take 3 month break., Disp: , Rfl:      hydroxychloroquine (PLAQUENIL) 200 MG tablet, Take 1 tablet (200 mg) by mouth 2 times daily, Disp: 60 tablet, Rfl: 3     levothyroxine (SYNTHROID/LEVOTHROID) 75 MCG tablet, Take 75 mcg by mouth daily, Disp: , Rfl:      mycophenolate (GENERIC EQUIVALENT) 500 MG tablet, Take 3 tablets (1,500 mg) by mouth 2 times daily, Disp: 180 tablet, Rfl: 3     sildenafil (REVATIO) 20 MG tablet, Take 1 tablet (20 mg) by mouth 3 times daily, Disp: 90 tablet, Rfl: 3     tiZANidine (ZANAFLEX) 4 MG tablet, Take 1 tablet (4 mg) by mouth 3 times daily as needed for muscle spasms, Disp: 60 tablet, Rfl: 3      Allergies:   Review of patient's allergies indicates no known allergies.        Past Medical History:  Hypothyroidism  Lupus     Past Surgical History:  The patient has no pertinent surgical history.     Family History:   The patient has no pertinent family history.        Social History:   Denies tobacco use. Denies alcohol use.       Physical Exam:   /71   Pulse 108   Wt 53.8 kg (118 lb 11.2 oz)   BMI 19.16 kg/m      Wt Readings from Last 4 Encounters:   02/13/20 53.8 kg (118 lb 11.2 oz)   02/13/20 53.8 kg (118 lb 11.2 oz)   01/23/20 55.3 kg (122 lb)   02/20/19 59.4 kg  (131 lb)     General: Well-appearing female in no distress. Alert and oriented.   Skin: Focused exam of the reticular hands and feet  - Distal fingers and toes with reticular purpuric plaques and scattered small ulcerations.    Laboratory:   RHEUM RESULTS Latest Ref Rng & Units 2/14/2019 2/4/2020   COMPLEMENT C3 81 - 157 mg/dL 29(L) 73(L)   COMPLEMENT C4 13 - 39 mg/dL 4(L) 7(L)   DNA-DS <10 IU/mL - 32(H)   SED RATE 0 - 20 mm/h - 48(H)   CRP, INFLAMMATION 0.0 - 8.0 mg/L - <2.9   CK TOTAL 30 - 225 U/L 30 -   AST 0 - 45 U/L 20 15   ALT 0 - 50 U/L 16 11   ALBUMIN 3.4 - 5.0 g/dL 3.4 3.6   WBC 4.0 - 11.0 10e9/L 2.2(L) 1.8(L)   RBC 3.8 - 5.2 10e12/L 4.12 3.99   HGB 11.7 - 15.7 g/dL 11.5(L) 11.3(L)   HCT 35.0 - 47.0 % 36.2 36.4   MCV 78 - 100 fl 88 91   MCHC 31.5 - 36.5 g/dL 31.8 31.0(L)   RDW 10.0 - 15.0 % 14.6 14.2    - 450 10e9/L 127(L) 148(L)   CREATININE 0.52 - 1.04 mg/dL 0.42(L) 0.43(L)   GFR ESTIMATE, IF BLACK >60 mL/min/[1.73:m2] >90 >90   GFR ESTIMATE >60 mL/min/[1.73:m2] >90 >90   HEPATITIS C ANTIBODY NR:Nonreactive Nonreactive -     DNA-ds   Date Value Ref Range Status   02/04/2020 32 (H) <10 IU/mL Final     Comment:     Positive   ,  ,   Beta 2 Glycoprotein 1 Antibody IgG   Date Value Ref Range Status   02/14/2019 1.4 <7 U/mL Final     Comment:     Negative     Beta 2 Glycoprotein 1 Antibody IgM   Date Value Ref Range Status   02/14/2019 <0.9 <7 U/mL Final     Comment:     Negative   ,   Cardiolipin Antibody IgG   Date Value Ref Range Status   02/14/2019 <1.6 0.0 - 19.9 GPL-U/mL Final     Comment:     Negative     Cardiolipin Antibody IgM   Date Value Ref Range Status   02/14/2019 0.2 0.0 - 19.9 MPL-U/mL Final     Comment:     Negative   ,  ,  ,  ,   Hep B Surface Agn   Date Value Ref Range Status   02/14/2019 Nonreactive NR^Nonreactive Final   ,  ,  ,  ,   Quantiferon-TB Gold Plus Result   Date Value Ref Range Status   02/14/2019 Negative NEG^Negative Final     Comment:     No interferon gamma response  to M.tuberculosis antigens was detected.   Infection with M.tuberculosis is unlikely, however a single negative result   does not exclude infection. In patients at high risk for infection, a second   test should be considered  in accordance with the 2017 ATS/IDSA/CDC Clinical Practice Guidelines for   Diagnosis of Tuberculosis in Adults and Children [Edilberto FRANCE et   al.Clin.Infect.Dis. 2017 64(2):111-115].       TB1 Ag minus Nil Value   Date Value Ref Range Status   02/14/2019 0.01 IU/mL Final     TB2 Ag minus Nil Value   Date Value Ref Range Status   02/14/2019 0.00 IU/mL Final     Mitogen minus Nil Result   Date Value Ref Range Status   02/14/2019 2.65 IU/mL Final     Nil Result   Date Value Ref Range Status   02/14/2019 0.07 IU/mL Final     Assessment and Plan:   # Chilblain lupus erythematosus, initial encounter  # Raynaud's phenomenon without gangrene  Patient relates increased Raynaud's color changes associated with pain in her hands and feet. She is currently taking 30mg of sildenafil daily. She finds this insufficient. Her Raynaud's attacks last for less than 20 minutes. Chilnblains lesions present. We also discussed the use of even more aggressive hand warming, including the use of electric gloves.      We will add amlodipine 5mg daily to her current regimen of 30mg TID sildenafil. This would be a better option than restarting prednisone, as she found prednisone ineffective in the past at 20mg and she prefers not to restart prednisone. Will switch clobetasol 0.05% cream BID. We switched from gel to cream since she finds the gel sticky and inconvenient so she is less compliant with use of the topical during the day.     Patient relates foot pain with walking, particularly in her toes. For her foot pain, she will begin lidocaine 5 percent cream.     We will also recheck lupus anticoagulant:  Lupus anticoagulant panel, IgG and IgM, as well as beta 2 glycoprotein antibodies.     Orders:  - Lupus Anticoagulant  Panel  - Cardiolipin Gisela IgG and IgM  - Beta 2 Glycoprotein Antibodies IGG IGM  - clobetasol (TEMOVATE) 0.05 % external cream  Dispense: 45 g; Refill: 0  - amLODIPine (NORVASC) 5 MG tablet  Dispense: 90 tablet; Refill: 1  - Lidocaine 5 % CREAM  Dispense: 30 g; Refill: 3    Follow-up: One month with Dr. Irby.      Scribe Disclosure:  I, Barby Wilder, am serving as a scribe to document services personally performed by Chico Quinonez MD at this visit, based upon the provider's statements to me. All documentation has been reviewed by the aforementioned provider prior to being entered into the official medical record.     Provider Disclosure:   The documentation recorded by the scribe accurately reflects the services I personally performed and the decisions made by me.    Chico Quinonez MD, FAAD    Departments of Internal Medicine and Dermatology  [facility]  [phone]

## 2020-02-17 DIAGNOSIS — M32.19 SYSTEMIC LUPUS ERYTHEMATOSUS WITH OTHER ORGAN INVOLVEMENT, UNSPECIFIED SLE TYPE (H): ICD-10-CM

## 2020-02-17 NOTE — TELEPHONE ENCOUNTER
[facility] Call Center    Phone Message    May a detailed message be left on voicemail: yes     Reason for Call: Other: Patient called said she need to see Surekha in one month. Surekha's schedule is out until July, patient need to be seen per notes in one month and per pt. Please call to discuss.     Action Taken: Other: [facility] Rheumatology    Travel Screening: Not Applicable

## 2020-02-18 LAB
B2 GLYCOPROT1 IGG SERPL IA-ACNC: 2.1 U/ML
B2 GLYCOPROT1 IGM SERPL IA-ACNC: <2.9 U/ML
CARDIOLIPIN ANTIBODY IGG: <1.6 GPL-U/ML (ref 0–19.9)
CARDIOLIPIN ANTIBODY IGM: <0.2 MPL-U/ML (ref 0–19.9)

## 2020-02-19 DIAGNOSIS — M32.9 SYSTEMIC LUPUS ERYTHEMATOSUS, UNSPECIFIED SLE TYPE, UNSPECIFIED ORGAN INVOLVEMENT STATUS (H): Primary | ICD-10-CM

## 2020-02-19 LAB — LA PPP-IMP: NEGATIVE

## 2020-02-20 NOTE — TELEPHONE ENCOUNTER
Ezio Irby MD Beard, Madeline, RN   Caller: Unspecified (1 week ago,  2:32 PM)             Why don't you schedule with Dr. Quinonez only on 3/5 and  me in 3-4 months. I want her to do the whole lupus panel on 3/5. I'll see her with Dr. Quinonez.      Will work with scheduling pt. If 3/5 does not work for pt, will try 3/19.    Manisha Michael RN  Rheumatology Clinic

## 2020-02-29 ENCOUNTER — MYC REFILL (OUTPATIENT)
Dept: RHEUMATOLOGY | Facility: CLINIC | Age: 25
End: 2020-02-29

## 2020-02-29 DIAGNOSIS — I73.01 RAYNAUD'S PHENOMENON WITH GANGRENE (H): ICD-10-CM

## 2020-03-02 RX ORDER — SILDENAFIL CITRATE 20 MG/1
20 TABLET ORAL 3 TIMES DAILY
Qty: 90 TABLET | Refills: 1 | Status: SHIPPED | OUTPATIENT
Start: 2020-03-02 | End: 2020-10-20

## 2020-03-03 NOTE — PROGRESS NOTES
[facility]  Dermatology-Rheumatology Clinic  Chico Quinonez MD  2020     Name: Yvonne Barron  MRN: [id number]  Age: 24 year old  : 1995  Referring provider: Ezio Irby    Dermatology Problem List:   1) Chilblains Lupus Erythematosus  2) Systemic lupus erythematosus  3) Graves disease s/p radioactive iodine ablation in 2013, post-ablation hypothyroidism    Encounter Date: 2020   Date of Last Visit: 2020  Orders Only on 2020   Component Date Value Ref Range Status     Lupus Result 2020 Negative  NEG^Negative Final    Comment: (Note)  COMMENTS:  The INR is normal.  APTT ratio is normal.    DRVVT Screen ratio is normal.  Thrombin time is normal.  NEGATIVE TEST; A LUPUS ANTICOAGULANT WAS NOT DETECTED IN THIS  SPECIMEN WITHIN THE LIMITS OF THE TESTING REPERTOIRE.  If the clinical picture is strongly suggestive of an antiphospholipid   syndrome, recommend anticardiolipin and beta-2-glycoprotein (IgG and  IgM) antibody tests.  Carole Christian M.D.  [phone]  2020                  INR =      1.04        Reference range: 0.86-1.14         Thrombin Time=     16.8        Reference range: 13.0-19.0 sec                    APTT:           Ratio       Patient  =      1.08       1:2 Mix  =      N/A       Reference:              Negative: Less than or equal to 1.16            Positive: Greater than or equal to 1.17                             DILUTE REAGNA VIPER VENOM TEST:                 Screen Ratio =     0.77       Normal is less than 1.21            Cardiolipin Antibody IgG 2020 <1.6  0.0 - 19.9 GPL-U/mL Final    Negative     Cardiolipin Antibody IgM 2020 <0.2  0.0 - 19.9 MPL-U/mL Final    Negative     Beta 2 Glycoprotein 1 Antibody IgG 2020 2.1  <7 U/mL Final    Negative     Beta 2 Glycoprotein 1 Antibody IgM 2020 <2.9  <7 U/mL Final    Negative       HPI:   Yvonne Barron is a 24 year old female with a hx of Graves disease s/p radioactive  "iodine ablation in 2013, post-ablation hypothyroidism, who was recently diagnosed with SLE in 11/2018 in a primary care setting who presents for follow up. I last saw the patient on 02/13/2020 at which time she reported painful Raynaud's related color changes in her hands and her feet turning white in cold weather and water. She had been on Prednisone with no relief in the past, and wanted to avoid it. Her Raynaud's attacks last for less than 20 minutes at that time, and Chilblains lesions were present. Plan was to add 5 mg of daily Amlodipine to her current TID 30 mg regimen of Sildenafil. She also endorsed foot pain with walking and was given 5% lidocaine cream.     Today, the patient believes her skin is getting worse. She states that her feet have new crusting which seemed to flare up when it got cold. She is unsure if the clobetasol 0.05% cream and lidocaine are helping. She also states that her hands are also getting worse with crusting on the tip of her 3rd right finger. She states that she is warming her hands and keeping gloves on as much as possible. She denies lightheadedness or other symptoms associated with the Sildenafil.      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 She states that she was taking a lower dose of Cellcept and Prednisone at that time. 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  Per picture on patient's phone from April 12 2019, there was poorly demarcated indurated pink plaques extending from the malar checks involving the melolabial folds up around the nasal labial crease also overlying the eyelids with extensive facial edema. Crust and arrosion over the plaques as well as a similar plaque on the right forehead. She denies biopsy at that time. She has never tried IVIG or Rituximab.     Otherwise she notes pain " in the bones pain particularly at night though her fatigue is somewhat improved. She denies arthralgias.     The patient was seen by Dr. Irby of Rheumatology on 02/13/2020. Impression was of SLE with weight loss and ongoing arthralgia. Plan was to continue Cellcept 1500 mg twice daily.     Review of Systems:   Pertinent items are noted in HPI or as below, remainder of complete ROS is negative.      Active Medications:     Current Outpatient Medications:      amLODIPine (NORVASC) 5 MG tablet, Take 1 tablet (5 mg) by mouth daily, Disp: 90 tablet, Rfl: 1     clobetasol (TEMOVATE) 0.05 % external cream, Apply topically 2 times daily, Disp: 45 g, Rfl: 0     clobetasol (TEMOVATE) 0.05 % external ointment, Apply topically 2 times daily To use over toe ulcers, Disp: 30 g, Rfl: 0     gabapentin (NEURONTIN) 300 MG capsule, Take 1 capsule (300 mg) by mouth 3 times daily, Disp: 90 capsule, Rfl: 3     hydroquinone (CRISTA) 4 % external cream, Apply to hyperpigmented areas twice a day, Disp: 30 g, Rfl: 3     hydroquinone (CRISTA) 4 % external cream, Apply to face BID for 3 months then take 3 month break., Disp: , Rfl:      hydroxychloroquine (PLAQUENIL) 200 MG tablet, Take 1 tablet (200 mg) by mouth 2 times daily, Disp: 60 tablet, Rfl: 3     levothyroxine (SYNTHROID/LEVOTHROID) 75 MCG tablet, Take 75 mcg by mouth daily, Disp: , Rfl:      Lidocaine 5 % CREA, Externally apply 1 Application topically 4 times daily as needed (for pain), Disp: 30 g, Rfl: 3     mycophenolate (GENERIC EQUIVALENT) 500 MG tablet, Take 3 tablets (1,500 mg) by mouth 2 times daily, Disp: 180 tablet, Rfl: 3     sildenafil (REVATIO) 20 MG tablet, Take 1 tablet (20 mg) by mouth 3 times daily, Disp: 90 tablet, Rfl: 1     tacrolimus (PROTOPIC) 0.1 % external ointment, Apply to facial rash twice a day, Disp: 60 g, Rfl: 3     tiZANidine (ZANAFLEX) 4 MG tablet, Take 1 tablet (4 mg) by mouth 3 times daily as needed for muscle spasms, Disp: 60 tablet, Rfl: 3     "  Allergies:   Patient has no known allergies.      Past Medical History:  Hypothyroidism  Lupus     Past Surgical History:  The patient has no pertinent surgical history.      Family History:   The patient has no pertinent family history.        Social History:   Denies tobacco use. Denies alcohol use.      Physical Exam:   BP (!) 87/61   Pulse 113   Temp 98.5  F (36.9  C) (Oral)   Ht 1.676 m (5' 6\")   Wt 53.2 kg (117 lb 4.8 oz)   SpO2 98%   BMI 18.93 kg/m     Wt Readings from Last 4 Encounters:   03/05/20 53.2 kg (117 lb 4.8 oz)   02/13/20 53.8 kg (118 lb 11.2 oz)   02/13/20 53.8 kg (118 lb 11.2 oz)   01/23/20 55.3 kg (122 lb)     General: Well-appearing female in no distress. Alert and oriented.   Skin: Focused exam of the hands, feet, chest, back, scalp, and face was performed.   Findings -   Small reticular purple plaques have resolved though there are now scarred appearing 2-3 mm crusted papule on the distal fingers.   On the left foot: Increasing retiform purple plaques on the toes now with overlying crusting. Almost a vesicular appearance to these reticular plaques that I assume are opening and crusting over. Similar appearance on the right.   Pink minimally indurated 1 cm plaque on the right eyelid moderately well demarcated. There is a hint of a similar plaque medially with crust. hyperpigmented patches on the forehead.   Healed type pigmented patches on the upper chest and a few on the upper back as well.   Good hair growth on the scalp.     Laboratory:   RHEUM RESULTS Latest Ref Rng & Units 2/14/2019 2/4/2020   COMPLEMENT C3 81 - 157 mg/dL 29(L) 73(L)   COMPLEMENT C4 13 - 39 mg/dL 4(L) 7(L)   DNA-DS <10 IU/mL - 32(H)   SED RATE 0 - 20 mm/h - 48(H)   CRP, INFLAMMATION 0.0 - 8.0 mg/L - <2.9   CK TOTAL 30 - 225 U/L 30 -   AST 0 - 45 U/L 20 15   ALT 0 - 50 U/L 16 11   ALBUMIN 3.4 - 5.0 g/dL 3.4 3.6   WBC 4.0 - 11.0 10e9/L 2.2(L) 1.8(L)   RBC 3.8 - 5.2 10e12/L 4.12 3.99   HGB 11.7 - 15.7 g/dL 11.5(L) " 11.3(L)   HCT 35.0 - 47.0 % 36.2 36.4   MCV 78 - 100 fl 88 91   MCHC 31.5 - 36.5 g/dL 31.8 31.0(L)   RDW 10.0 - 15.0 % 14.6 14.2    - 450 10e9/L 127(L) 148(L)   CREATININE 0.52 - 1.04 mg/dL 0.42(L) 0.43(L)   GFR ESTIMATE, IF BLACK >60 mL/min/[1.73:m2] >90 >90   GFR ESTIMATE >60 mL/min/[1.73:m2] >90 >90   HEPATITIS C ANTIBODY NR:Nonreactive Nonreactive -     DNA-ds   Date Value Ref Range Status   02/04/2020 32 (H) <10 IU/mL Final     Comment:     Positive     Beta 2 Glycoprotein 1 Antibody IgG   Date Value Ref Range Status   02/17/2020 2.1 <7 U/mL Final     Comment:     Negative     Beta 2 Glycoprotein 1 Antibody IgM   Date Value Ref Range Status   02/17/2020 <2.9 <7 U/mL Final     Comment:     Negative   ,   Cardiolipin Antibody IgG   Date Value Ref Range Status   02/17/2020 <1.6 0.0 - 19.9 GPL-U/mL Final     Comment:     Negative     Cardiolipin Antibody IgM   Date Value Ref Range Status   02/17/2020 <0.2 0.0 - 19.9 MPL-U/mL Final     Comment:     Negative     Hep B Surface Agn   Date Value Ref Range Status   02/14/2019 Nonreactive NR^Nonreactive Final     Quantiferon-TB Gold Plus Result   Date Value Ref Range Status   02/14/2019 Negative NEG^Negative Final     Comment:     No interferon gamma response to M.tuberculosis antigens was detected.   Infection with M.tuberculosis is unlikely, however a single negative result   does not exclude infection. In patients at high risk for infection, a second   test should be considered  in accordance with the 2017 ATS/IDSA/CDC Clinical Practice Guidelines for   Diagnosis of Tuberculosis in Adults and Children [Natashan DM et   al.Clin.Infect.Dis. 2017 64(2):111-115].       TB1 Ag minus Nil Value   Date Value Ref Range Status   02/14/2019 0.01 IU/mL Final     TB2 Ag minus Nil Value   Date Value Ref Range Status   02/14/2019 0.00 IU/mL Final     Mitogen minus Nil Result   Date Value Ref Range Status   02/14/2019 2.65 IU/mL Final     Nil Result   Date Value Ref Range  Status   02/14/2019 0.07 IU/mL Final     Assessment and Plan:  #SLE   # Chilblain lupus erythematosus, initial encounter  # Raynaud's phenomenon without gangrene  The patient relates that she is actively warming her hands with moderate success though she does note opening and crusting of sores present during our previous visit.  She is currently taking 30mg of sildenafil daily as well as 5 mg amlodipine. Chilnblains lesions present. We also discussed the use of even more aggressive hand warming, including the use of electric gloves.  Her BP is 80s/60s although she has no side effects.     Maintain amlodipine 5mg daily with current regimen of 30mg TID sildenafil. We will not add further amlodipine given her hypotension today. Ok to continue clobetasol 0.05% cream BID if she would like. Apply tacrolimus cream twice daily to face.   --  Start Tacrolimus 0.1% external ointment     I explained to the patient's lack of antiphospholipid antibodies seems to point to Chilblain lupus erythematous. The deep plaques could have been discoid lupus. I offered a biopsy and repeat autoimmune labs but explained that this will not  . I explained we could also consider Benlysta, Rituximab, or IV IG. In shared decision making, we discussed holding off on these treatment plans and medication to see what changes as the weather warms. We will, however, trial a course of Gabapentin 300 mg increasing by one pill weekly to three times daily for pain control. Plan is to recheck blood work for further evaluation of autoimmune angioedema.   -- Start Gabapentin 300 mg TID    She also previously had significant angioedema. Will check C1 INH leve/function and C1q function.       #Hyperpigmentation   - DDx: Plaquenil hyperpigmentation vs post-inflammatory hyperpigmentation. Less likely melasma.    The patient reports hyperpigmentation patches on her forehead. I prescribed hydroquinone 4% cream twice daily.     Orders:   -  hydroquinone (CRISTA) 4 % external cream  Dispense: 30 g; Refill: 3   - Complement C1Q Function Assay  - C1-Esterase Inhibitor Level  - C1 esterase inhibitor functional  - tacrolimus (PROTOPIC) 0.1 % external ointment  Dispense: 60 g; Refill: 3  - gabapentin (NEURONTIN) 300 MG capsule  Dispense: 90 capsule; Refill: 3      Follow-up: Return in about 8 weeks (around 4/30/2020).     Scribe Disclosure:  I, Saida Holden, am serving as a scribe to document services personally performed by Chico Quinonez MD at this visit, based upon the provider's statements to me. All documentation has been reviewed by the aforementioned provider prior to being entered into the official medical record.     Provider Disclosure:   The documentation recorded by the scribe accurately reflects the services I personally performed and the decisions made by me.    Chico Quinonez MD, FAAD    Departments of Internal Medicine and Dermatology  [facility]  [phone]

## 2020-03-04 ENCOUNTER — TELEPHONE (OUTPATIENT)
Dept: DERMATOLOGY | Facility: CLINIC | Age: 25
End: 2020-03-04

## 2020-03-04 NOTE — TELEPHONE ENCOUNTER
Spoke to patient , called with a reminder about there upcoming appointment , also instructed to bring medications or medication list.    Mari Pina CMA

## 2020-03-05 ENCOUNTER — OFFICE VISIT (OUTPATIENT)
Dept: DERMATOLOGY | Facility: CLINIC | Age: 25
End: 2020-03-05
Attending: DERMATOLOGY
Payer: COMMERCIAL

## 2020-03-05 VITALS
TEMPERATURE: 98.5 F | OXYGEN SATURATION: 98 % | SYSTOLIC BLOOD PRESSURE: 87 MMHG | BODY MASS INDEX: 18.85 KG/M2 | HEART RATE: 113 BPM | DIASTOLIC BLOOD PRESSURE: 61 MMHG | HEIGHT: 66 IN | WEIGHT: 117.3 LBS

## 2020-03-05 DIAGNOSIS — L81.9 HYPERPIGMENTATION: ICD-10-CM

## 2020-03-05 DIAGNOSIS — Z87.898 H/O ANGIOEDEMA: Primary | ICD-10-CM

## 2020-03-05 DIAGNOSIS — R52 PAIN: ICD-10-CM

## 2020-03-05 PROCEDURE — G0463 HOSPITAL OUTPT CLINIC VISIT: HCPCS | Mod: ZF

## 2020-03-05 RX ORDER — GABAPENTIN 300 MG/1
300 CAPSULE ORAL 3 TIMES DAILY
Qty: 90 CAPSULE | Refills: 3 | Status: SHIPPED | OUTPATIENT
Start: 2020-03-05 | End: 2020-06-12

## 2020-03-05 RX ORDER — TACROLIMUS 1 MG/G
OINTMENT TOPICAL
Qty: 60 G | Refills: 3 | Status: SHIPPED | OUTPATIENT
Start: 2020-03-05 | End: 2020-12-12

## 2020-03-05 RX ORDER — HYDROQUINONE 40 MG/G
CREAM TOPICAL
Qty: 30 G | Refills: 3 | Status: SHIPPED | OUTPATIENT
Start: 2020-03-05 | End: 2020-04-28

## 2020-03-05 ASSESSMENT — PAIN SCALES - GENERAL: PAINLEVEL: MODERATE PAIN (5)

## 2020-03-05 ASSESSMENT — MIFFLIN-ST. JEOR: SCORE: 1288.82

## 2020-03-05 NOTE — LETTER
3/5/2020       RE: Yvonne Barron  [address]  [facility] [address]     Dear Colleague,    Thank you for referring your patient, Yvonne Barron, to the [facility] RHEUMATOLOGY at [facility]. Please see a copy of my visit note below.    [facility]  Dermatology-Rheumatology Clinic  Chico Quinonez MD  2020     Name: Yvonne Barron  MRN: [id number]  Age: 24 year old  : 1995  Referring provider: Ezio Irby    Dermatology Problem List:   1) Chilblains Lupus Erythematosus  2) Systemic lupus erythematosus  3) Graves disease s/p radioactive iodine ablation in 2013, post-ablation hypothyroidism    Encounter Date: 2020   Date of Last Visit: 2020  Orders Only on 2020   Component Date Value Ref Range Status     Lupus Result 2020 Negative  NEG^Negative Final    Comment: (Note)  COMMENTS:  The INR is normal.  APTT ratio is normal.    DRVVT Screen ratio is normal.  Thrombin time is normal.  NEGATIVE TEST; A LUPUS ANTICOAGULANT WAS NOT DETECTED IN THIS  SPECIMEN WITHIN THE LIMITS OF THE TESTING REPERTOIRE.  If the clinical picture is strongly suggestive of an antiphospholipid   syndrome, recommend anticardiolipin and beta-2-glycoprotein (IgG and  IgM) antibody tests.  Carole Christian M.D.  [phone]  2020                  INR =      1.04        Reference range: 0.86-1.14         Thrombin Time=     16.8        Reference range: 13.0-19.0 sec                    APTT:           Ratio       Patient  =      1.08       1:2 Mix  =      N/A       Reference:              Negative: Less than or equal to 1.16            Positive: Greater than or equal to 1.17                             DIL[facility] VENOM TEST:                 Screen Ratio =     0.77       Normal is less than 1.21            Cardiolipin Antibody IgG 2020 <1.6  0.0 - 19.9 GPL-U/mL Final    Negative     Cardiolipin Antibody IgM 2020 <0.2  0.0 - 19.9  "MPL-U/mL Final    Negative     Beta 2 Glycoprotein 1 Antibody IgG 02/17/2020 2.1  <7 U/mL Final    Negative     Beta 2 Glycoprotein 1 Antibody IgM 02/17/2020 <2.9  <7 U/mL Final    Negative       HPI:   Yvonne Barron is a 24 year old female with a hx of Graves disease s/p radioactive iodine ablation in 2013, post-ablation hypothyroidism, who was recently diagnosed with SLE in 11/2018 in a primary care setting who presents for follow up. I last saw the patient on 02/13/2020 at which time she reported painful Raynaud's related color changes in her hands and her feet turning white in cold weather and water. She had been on Prednisone with no relief in the past, and wanted to avoid it. Her Raynaud's attacks last for less than 20 minutes at that time, and Chilblains lesions were present. Plan was to add 5 mg of daily Amlodipine to her current TID 30 mg regimen of Sildenafil. She also endorsed foot pain with walking and was given 5% lidocaine cream.     Today, the patient believes her skin is getting worse. She states that her feet have new crusting which seemed to flare up when it got cold. She is unsure if the clobetasol 0.05% cream and lidocaine are helping. She also states that her hands are also getting worse with crusting on the tip of her 3rd right finger. She states that she is warming her hands and keeping gloves on as much as possible. She denies lightheadedness or other symptoms associated with the Sildenafil.      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 She states that she was taking a lower dose of Cellcept and Prednisone at that time. 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  Per picture on patient's phone from April 12 2019, there was poorly demarcated indurated pink plaques extending from the malar checks involving the " melolabial folds up around the nasal labial crease also overlying the eyelids with extensive facial edema. Crust and arrosion over the plaques as well as a similar plaque on the right forehead. She denies biopsy at that time. She has never tried IVIG or Rituximab.     Otherwise she notes pain in the bones pain particularly at night though her fatigue is somewhat improved. She denies arthralgias.     The patient was seen by Dr. Irby of Rheumatology on 02/13/2020. Impression was of SLE with weight loss and ongoing arthralgia. Plan was to continue Cellcept 1500 mg twice daily.     Review of Systems:   Pertinent items are noted in HPI or as below, remainder of complete ROS is negative.      Active Medications:     Current Outpatient Medications:      amLODIPine (NORVASC) 5 MG tablet, Take 1 tablet (5 mg) by mouth daily, Disp: 90 tablet, Rfl: 1     clobetasol (TEMOVATE) 0.05 % external cream, Apply topically 2 times daily, Disp: 45 g, Rfl: 0     clobetasol (TEMOVATE) 0.05 % external ointment, Apply topically 2 times daily To use over toe ulcers, Disp: 30 g, Rfl: 0     gabapentin (NEURONTIN) 300 MG capsule, Take 1 capsule (300 mg) by mouth 3 times daily, Disp: 90 capsule, Rfl: 3     hydroquinone (CRISTA) 4 % external cream, Apply to hyperpigmented areas twice a day, Disp: 30 g, Rfl: 3     hydroquinone (CRISTA) 4 % external cream, Apply to face BID for 3 months then take 3 month break., Disp: , Rfl:      hydroxychloroquine (PLAQUENIL) 200 MG tablet, Take 1 tablet (200 mg) by mouth 2 times daily, Disp: 60 tablet, Rfl: 3     levothyroxine (SYNTHROID/LEVOTHROID) 75 MCG tablet, Take 75 mcg by mouth daily, Disp: , Rfl:      Lidocaine 5 % CREA, Externally apply 1 Application topically 4 times daily as needed (for pain), Disp: 30 g, Rfl: 3     mycophenolate (GENERIC EQUIVALENT) 500 MG tablet, Take 3 tablets (1,500 mg) by mouth 2 times daily, Disp: 180 tablet, Rfl: 3     sildenafil (REVATIO) 20 MG tablet, Take 1 tablet (20 mg)  "by mouth 3 times daily, Disp: 90 tablet, Rfl: 1     tacrolimus (PROTOPIC) 0.1 % external ointment, Apply to facial rash twice a day, Disp: 60 g, Rfl: 3     tiZANidine (ZANAFLEX) 4 MG tablet, Take 1 tablet (4 mg) by mouth 3 times daily as needed for muscle spasms, Disp: 60 tablet, Rfl: 3      Allergies:   Patient has no known allergies.      Past Medical History:  Hypothyroidism  Lupus     Past Surgical History:  The patient has no pertinent surgical history.      Family History:   The patient has no pertinent family history.        Social History:   Denies tobacco use. Denies alcohol use.      Physical Exam:   BP (!) 87/61   Pulse 113   Temp 98.5  F (36.9  C) (Oral)   Ht 1.676 m (5' 6\")   Wt 53.2 kg (117 lb 4.8 oz)   SpO2 98%   BMI 18.93 kg/m      Wt Readings from Last 4 Encounters:   03/05/20 53.2 kg (117 lb 4.8 oz)   02/13/20 53.8 kg (118 lb 11.2 oz)   02/13/20 53.8 kg (118 lb 11.2 oz)   01/23/20 55.3 kg (122 lb)     General: Well-appearing female in no distress. Alert and oriented.   Skin: Focused exam of the hands, feet, chest, back, scalp, and face was performed.   Findings -   Small reticular purple plaques have resolved though there are now scarred appearing 2-3 mm crusted papule on the distal fingers.   On the left foot: Increasing retiform purple plaques on the toes now with overlying crusting. Almost a vesicular appearance to these reticular plaques that I assume are opening and crusting over. Similar appearance on the right.   Pink minimally indurated 1 cm plaque on the right eyelid moderately well demarcated. There is a hint of a similar plaque medially with crust. hyperpigmented patches on the forehead.   Healed type pigmented patches on the upper chest and a few on the upper back as well.   Good hair growth on the scalp.     Laboratory:   RHEUM RESULTS Latest Ref Rng & Units 2/14/2019 2/4/2020   COMPLEMENT C3 81 - 157 mg/dL 29(L) 73(L)   COMPLEMENT C4 13 - 39 mg/dL 4(L) 7(L)   DNA-DS <10 IU/mL - " 32(H)   SED RATE 0 - 20 mm/h - 48(H)   CRP, INFLAMMATION 0.0 - 8.0 mg/L - <2.9   CK TOTAL 30 - 225 U/L 30 -   AST 0 - 45 U/L 20 15   ALT 0 - 50 U/L 16 11   ALBUMIN 3.4 - 5.0 g/dL 3.4 3.6   WBC 4.0 - 11.0 10e9/L 2.2(L) 1.8(L)   RBC 3.8 - 5.2 10e12/L 4.12 3.99   HGB 11.7 - 15.7 g/dL 11.5(L) 11.3(L)   HCT 35.0 - 47.0 % 36.2 36.4   MCV 78 - 100 fl 88 91   MCHC 31.5 - 36.5 g/dL 31.8 31.0(L)   RDW 10.0 - 15.0 % 14.6 14.2    - 450 10e9/L 127(L) 148(L)   CREATININE 0.52 - 1.04 mg/dL 0.42(L) 0.43(L)   GFR ESTIMATE, IF BLACK >60 mL/min/[1.73:m2] >90 >90   GFR ESTIMATE >60 mL/min/[1.73:m2] >90 >90   HEPATITIS C ANTIBODY NR:Nonreactive Nonreactive -     DNA-ds   Date Value Ref Range Status   02/04/2020 32 (H) <10 IU/mL Final     Comment:     Positive     Beta 2 Glycoprotein 1 Antibody IgG   Date Value Ref Range Status   02/17/2020 2.1 <7 U/mL Final     Comment:     Negative     Beta 2 Glycoprotein 1 Antibody IgM   Date Value Ref Range Status   02/17/2020 <2.9 <7 U/mL Final     Comment:     Negative   ,   Cardiolipin Antibody IgG   Date Value Ref Range Status   02/17/2020 <1.6 0.0 - 19.9 GPL-U/mL Final     Comment:     Negative     Cardiolipin Antibody IgM   Date Value Ref Range Status   02/17/2020 <0.2 0.0 - 19.9 MPL-U/mL Final     Comment:     Negative     Hep B Surface Agn   Date Value Ref Range Status   02/14/2019 Nonreactive NR^Nonreactive Final     Quantiferon-TB Gold Plus Result   Date Value Ref Range Status   02/14/2019 Negative NEG^Negative Final     Comment:     No interferon gamma response to M.tuberculosis antigens was detected.   Infection with M.tuberculosis is unlikely, however a single negative result   does not exclude infection. In patients at high risk for infection, a second   test should be considered  in accordance with the 2017 ATS/IDSA/CDC Clinical Practice Guidelines for   Diagnosis of Tuberculosis in Adults and Children [Edilberto FRANCE et   al.Clin.Infect.Dis. 2017 64(2):111-115].       TB1 Ag  minus Nil Value   Date Value Ref Range Status   02/14/2019 0.01 IU/mL Final     TB2 Ag minus Nil Value   Date Value Ref Range Status   02/14/2019 0.00 IU/mL Final     Mitogen minus Nil Result   Date Value Ref Range Status   02/14/2019 2.65 IU/mL Final     Nil Result   Date Value Ref Range Status   02/14/2019 0.07 IU/mL Final     Assessment and Plan:  #SLE   # Chilblain lupus erythematosus, initial encounter  # Raynaud's phenomenon without gangrene  The patient relates that she is actively warming her hands with moderate success though she does note opening and crusting of sores present during our previous visit.  She is currently taking 30mg of sildenafil daily as well as 5 mg amlodipine. Chilnblains lesions present. We also discussed the use of even more aggressive hand warming, including the use of electric gloves.  Her BP is 80s/60s although she has no side effects.     Maintain amlodipine 5mg daily with current regimen of 30mg TID sildenafil . We will not add further amlodipine given her hypotension today. Ok to continue clobetasol 0.05% cream BID if she would like.  Apply tacrolimus cream twice daily to face.   --  Start Tacrolimus 0.1% external ointment     I explained to the patient's lack of antiphospholipid antibodies seems to point to Chilblain lupus erythematous. The deep plaques could have been discoid lupus. I offered a biopsy and repeat autoimmune labs but explained that this will not  . I explained we could also consider Benlysta, Rituximab, or IV IG. In shared decision making, we discussed holding off on these treatment plans and medication to see what changes as the weather warms. We will, however, trial a course of Gabapentin 300 mg increasing by one pill weekly to three times daily for pain control. Plan is to recheck blood work for further evaluation of autoimmune angioedema.   -- Start Gabapentin 300 mg TID    She also previously had significant angioedema. Will check C1 INH  leve/function and C1q function.       #Hyperpigmentation   - DDx: Plaquenil hyperpigmentation vs post-inflammatory hyperpigmentation. Less likely melasma.    The patient reports hyperpigmentation patches on her forehead. I prescribed hydroquinone 4% cream twice daily.     Orders:   - hydroquinone (CRISTA) 4 % external cream  Dispense: 30 g; Refill: 3   - Complement C1Q Function Assay  - C1-Esterase Inhibitor Level  - C1 esterase inhibitor functional  - tacrolimus (PROTOPIC) 0.1 % external ointment  Dispense: 60 g; Refill: 3  - gabapentin (NEURONTIN) 300 MG capsule  Dispense: 90 capsule; Refill: 3      Follow-up: Return in about 8 weeks (around 4/30/2020).     Scribe Disclosure:  I, Saida Holden, am serving as a scribe to document services personally performed by Chico Quinonez MD at this visit, based upon the provider's statements to me. All documentation has been reviewed by the aforementioned provider prior to being entered into the official medical record.     Provider Disclosure:   The documentation recorded by the scribe accurately reflects the services I personally performed and the decisions made by me.    Chico Quinonez MD, FAAD    Departments of Internal Medicine and Dermatology  [facility]  [phone]

## 2020-03-05 NOTE — PATIENT INSTRUCTIONS
Gabapentin 300mg tree times a day. Start once daily and increase by one pill every week  Protopic ointment twice a day for rash on face  Hydroquinone cream twice a day to hyperpigmented creams

## 2020-03-05 NOTE — LETTER
3/5/2020      RE: Yvonne Barron  [address]  [facility] [address]       [facility]  Dermatology-Rheumatology Clinic  Chico Quinonez MD  2020     Name: Yvonne Barron  MRN: [id number]  Age: 24 year old  : 1995  Referring provider: Ezio Irby    Dermatology Problem List:   1) Chilblains Lupus Erythematosus  2) Systemic lupus erythematosus  3) Graves disease s/p radioactive iodine ablation in 2013, post-ablation hypothyroidism    Encounter Date: 2020   Date of Last Visit: 2020  Orders Only on 2020   Component Date Value Ref Range Status     Lupus Result 2020 Negative  NEG^Negative Final    Comment: (Note)  COMMENTS:  The INR is normal.  APTT ratio is normal.    DRVVT Screen ratio is normal.  Thrombin time is normal.  NEGATIVE TEST; A LUPUS ANTICOAGULANT WAS NOT DETECTED IN THIS  SPECIMEN WITHIN THE LIMITS OF THE TESTING REPERTOIRE.  If the clinical picture is strongly suggestive of an antiphospholipid   syndrome, recommend anticardiolipin and beta-2-glycoprotein (IgG and  IgM) antibody tests.  Carole Christian M.D.  [phone]  2020                  INR =      1.04        Reference range: 0.86-1.14         Thrombin Time=     16.8        Reference range: 13.0-19.0 sec                    APTT:           Ratio       Patient  =      1.08       1:2 Mix  =      N/A       Reference:              Negative: Less than or equal to 1.16            Positive: Greater than or equal to 1.17                             DIL[facility] VENOM TEST:                 Screen Ratio =     0.77       Normal is less than 1.21            Cardiolipin Antibody IgG 2020 <1.6  0.0 - 19.9 GPL-U/mL Final    Negative     Cardiolipin Antibody IgM 2020 <0.2  0.0 - 19.9 MPL-U/mL Final    Negative     Beta 2 Glycoprotein 1 Antibody IgG 2020 2.1  <7 U/mL Final    Negative     Beta 2 Glycoprotein 1 Antibody IgM 2020 <2.9  <7 U/mL Final    Negative       HPI:  "  Yvonne Barron is a 24 year old female with a hx of Graves disease s/p radioactive iodine ablation in 2013, post-ablation hypothyroidism, who was recently diagnosed with SLE in 11/2018 in a primary care setting who presents for follow up. I last saw the patient on 02/13/2020 at which time she reported painful Raynaud's related color changes in her hands and her feet turning white in cold weather and water. She had been on Prednisone with no relief in the past, and wanted to avoid it. Her Raynaud's attacks last for less than 20 minutes at that time, and Chilblains lesions were present. Plan was to add 5 mg of daily Amlodipine to her current TID 30 mg regimen of Sildenafil. She also endorsed foot pain with walking and was given 5% lidocaine cream.     Today, the patient believes her skin is getting worse. She states that her feet have new crusting which seemed to flare up when it got cold. She is unsure if the clobetasol 0.05% cream and lidocaine are helping. She also states that her hands are also getting worse with crusting on the tip of her 3rd right finger. She states that she is warming her hands and keeping gloves on as much as possible. She denies lightheadedness or other symptoms associated with the Sildenafil.      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 She states that she was taking a lower dose of Cellcept and Prednisone at that time. 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  Per picture on patient's phone from April 12 2019, there was poorly demarcated indurated pink plaques extending from the malar checks involving the melolabial folds up around the nasal labial crease also overlying the eyelids with extensive facial edema. Crust and arrosion over the plaques as well as a similar plaque on the right forehead. She denies " biopsy at that time. She has never tried IVIG or Rituximab.     Otherwise she notes pain in the bones pain particularly at night though her fatigue is somewhat improved. She denies arthralgias.     The patient was seen by Dr. Irby of Rheumatology on 02/13/2020. Impression was of SLE with weight loss and ongoing arthralgia. Plan was to continue Cellcept 1500 mg twice daily.     Review of Systems:   Pertinent items are noted in HPI or as below, remainder of complete ROS is negative.      Active Medications:     Current Outpatient Medications:      amLODIPine (NORVASC) 5 MG tablet, Take 1 tablet (5 mg) by mouth daily, Disp: 90 tablet, Rfl: 1     clobetasol (TEMOVATE) 0.05 % external cream, Apply topically 2 times daily, Disp: 45 g, Rfl: 0     clobetasol (TEMOVATE) 0.05 % external ointment, Apply topically 2 times daily To use over toe ulcers, Disp: 30 g, Rfl: 0     gabapentin (NEURONTIN) 300 MG capsule, Take 1 capsule (300 mg) by mouth 3 times daily, Disp: 90 capsule, Rfl: 3     hydroquinone (CRISTA) 4 % external cream, Apply to hyperpigmented areas twice a day, Disp: 30 g, Rfl: 3     hydroquinone (CRISTA) 4 % external cream, Apply to face BID for 3 months then take 3 month break., Disp: , Rfl:      hydroxychloroquine (PLAQUENIL) 200 MG tablet, Take 1 tablet (200 mg) by mouth 2 times daily, Disp: 60 tablet, Rfl: 3     levothyroxine (SYNTHROID/LEVOTHROID) 75 MCG tablet, Take 75 mcg by mouth daily, Disp: , Rfl:      Lidocaine 5 % CREA, Externally apply 1 Application topically 4 times daily as needed (for pain), Disp: 30 g, Rfl: 3     mycophenolate (GENERIC EQUIVALENT) 500 MG tablet, Take 3 tablets (1,500 mg) by mouth 2 times daily, Disp: 180 tablet, Rfl: 3     sildenafil (REVATIO) 20 MG tablet, Take 1 tablet (20 mg) by mouth 3 times daily, Disp: 90 tablet, Rfl: 1     tacrolimus (PROTOPIC) 0.1 % external ointment, Apply to facial rash twice a day, Disp: 60 g, Rfl: 3     tiZANidine (ZANAFLEX) 4 MG tablet, Take 1 tablet  "(4 mg) by mouth 3 times daily as needed for muscle spasms, Disp: 60 tablet, Rfl: 3      Allergies:   Patient has no known allergies.      Past Medical History:  Hypothyroidism  Lupus     Past Surgical History:  The patient has no pertinent surgical history.      Family History:   The patient has no pertinent family history.        Social History:   Denies tobacco use. Denies alcohol use.      Physical Exam:   BP (!) 87/61   Pulse 113   Temp 98.5  F (36.9  C) (Oral)   Ht 1.676 m (5' 6\")   Wt 53.2 kg (117 lb 4.8 oz)   SpO2 98%   BMI 18.93 kg/m      Wt Readings from Last 4 Encounters:   03/05/20 53.2 kg (117 lb 4.8 oz)   02/13/20 53.8 kg (118 lb 11.2 oz)   02/13/20 53.8 kg (118 lb 11.2 oz)   01/23/20 55.3 kg (122 lb)     General: Well-appearing female in no distress. Alert and oriented.   Skin: Focused exam of the hands, feet, chest, back, scalp, and face was performed.   Findings -   Small reticular purple plaques have resolved though there are now scarred appearing 2-3 mm crusted papule on the distal fingers.   On the left foot: Increasing retiform purple plaques on the toes now with overlying crusting. Almost a vesicular appearance to these reticular plaques that I assume are opening and crusting over. Similar appearance on the right.   Pink minimally indurated 1 cm plaque on the right eyelid moderately well demarcated. There is a hint of a similar plaque medially with crust. hyperpigmented patches on the forehead.   Healed type pigmented patches on the upper chest and a few on the upper back as well.   Good hair growth on the scalp.     Laboratory:   RHEUM RESULTS Latest Ref Rng & Units 2/14/2019 2/4/2020   COMPLEMENT C3 81 - 157 mg/dL 29(L) 73(L)   COMPLEMENT C4 13 - 39 mg/dL 4(L) 7(L)   DNA-DS <10 IU/mL - 32(H)   SED RATE 0 - 20 mm/h - 48(H)   CRP, INFLAMMATION 0.0 - 8.0 mg/L - <2.9   CK TOTAL 30 - 225 U/L 30 -   AST 0 - 45 U/L 20 15   ALT 0 - 50 U/L 16 11   ALBUMIN 3.4 - 5.0 g/dL 3.4 3.6   WBC 4.0 - 11.0 " 10e9/L 2.2(L) 1.8(L)   RBC 3.8 - 5.2 10e12/L 4.12 3.99   HGB 11.7 - 15.7 g/dL 11.5(L) 11.3(L)   HCT 35.0 - 47.0 % 36.2 36.4   MCV 78 - 100 fl 88 91   MCHC 31.5 - 36.5 g/dL 31.8 31.0(L)   RDW 10.0 - 15.0 % 14.6 14.2    - 450 10e9/L 127(L) 148(L)   CREATININE 0.52 - 1.04 mg/dL 0.42(L) 0.43(L)   GFR ESTIMATE, IF BLACK >60 mL/min/[1.73:m2] >90 >90   GFR ESTIMATE >60 mL/min/[1.73:m2] >90 >90   HEPATITIS C ANTIBODY NR:Nonreactive Nonreactive -     DNA-ds   Date Value Ref Range Status   02/04/2020 32 (H) <10 IU/mL Final     Comment:     Positive     Beta 2 Glycoprotein 1 Antibody IgG   Date Value Ref Range Status   02/17/2020 2.1 <7 U/mL Final     Comment:     Negative     Beta 2 Glycoprotein 1 Antibody IgM   Date Value Ref Range Status   02/17/2020 <2.9 <7 U/mL Final     Comment:     Negative   ,   Cardiolipin Antibody IgG   Date Value Ref Range Status   02/17/2020 <1.6 0.0 - 19.9 GPL-U/mL Final     Comment:     Negative     Cardiolipin Antibody IgM   Date Value Ref Range Status   02/17/2020 <0.2 0.0 - 19.9 MPL-U/mL Final     Comment:     Negative     Hep B Surface Agn   Date Value Ref Range Status   02/14/2019 Nonreactive NR^Nonreactive Final     Quantiferon-TB Gold Plus Result   Date Value Ref Range Status   02/14/2019 Negative NEG^Negative Final     Comment:     No interferon gamma response to M.tuberculosis antigens was detected.   Infection with M.tuberculosis is unlikely, however a single negative result   does not exclude infection. In patients at high risk for infection, a second   test should be considered  in accordance with the 2017 ATS/IDSA/CDC Clinical Practice Guidelines for   Diagnosis of Tuberculosis in Adults and Children [Edilberto FR[place] et   al.Clin.Infect.Dis. 2017 64(2):111-115].       TB1 Ag minus Nil Value   Date Value Ref Range Status   02/14/2019 0.01 IU/mL Final     TB2 Ag minus Nil Value   Date Value Ref Range Status   02/14/2019 0.00 IU/mL Final     Mitogen minus Nil Result   Date Value Ref  Range Status   02/14/2019 2.65 IU/mL Final     Nil Result   Date Value Ref Range Status   02/14/2019 0.07 IU/mL Final     Assessment and Plan:  #SLE   # Chilblain lupus erythematosus, initial encounter  # Raynaud's phenomenon without gangrene  The patient relates that she is actively warming her hands with moderate success though she does note opening and crusting of sores present during our previous visit.  She is currently taking 30mg of sildenafil daily as well as 5 mg amlodipine. Chilnblains lesions present. We also discussed the use of even more aggressive hand warming, including the use of electric gloves.  Her BP is 80s/60s although she has no side effects.     Maintain amlodipine 5mg daily with current regimen of 30mg TID sildenafil . We will not add further amlodipine given her hypotension today. Ok to continue clobetasol 0.05% cream BID if she would like.  Apply tacrolimus cream twice daily to face.   --  Start Tacrolimus 0.1% external ointment     I explained to the patient's lack of antiphospholipid antibodies seems to point to Chilblain lupus erythematous. The deep plaques could have been discoid lupus. I offered a biopsy and repeat autoimmune labs but explained that this will not  . I explained we could also consider Benlysta, Rituximab, or IV IG. In shared decision making, we discussed holding off on these treatment plans and medication to see what changes as the weather warms. We will, however, trial a course of Gabapentin 300 mg increasing by one pill weekly to three times daily for pain control. Plan is to recheck blood work for further evaluation of autoimmune angioedema.   -- Start Gabapentin 300 mg TID    She also previously had significant angioedema. Will check C1 INH leve/function and C1q function.       #Hyperpigmentation   - DDx: Plaquenil hyperpigmentation vs post-inflammatory hyperpigmentation. Less likely melasma.    The patient reports hyperpigmentation patches on her  forehead. I prescribed hydroquinone 4% cream twice daily.     Orders:   - hydroquinone (CRISTA) 4 % external cream  Dispense: 30 g; Refill: 3   - Complement C1Q Function Assay  - C1-Esterase Inhibitor Level  - C1 esterase inhibitor functional  - tacrolimus (PROTOPIC) 0.1 % external ointment  Dispense: 60 g; Refill: 3  - gabapentin (NEURONTIN) 300 MG capsule  Dispense: 90 capsule; Refill: 3      Follow-up: Return in about 8 weeks (around 4/30/2020).     Scribe Disclosure:  I, Saida Holden, am serving as a scribe to document services personally performed by Chico Quinonez MD at this visit, based upon the provider's statements to me. All documentation has been reviewed by the aforementioned provider prior to being entered into the official medical record.     Provider Disclosure:   The documentation recorded by the scribe accurately reflects the services I personally performed and the decisions made by me.    Chico Quinonez MD, FAAD    Departments of Internal Medicine and Dermatology  [facility]  [phone]        Chico Quinonez MD

## 2020-03-05 NOTE — NURSING NOTE
"Chief Complaint   Patient presents with     RECHECK     Chilblain lupus erythematosus     BP (!) 87/61   Pulse 113   Temp 98.5  F (36.9  C) (Oral)   Ht 1.676 m (5' 6\")   Wt 53.2 kg (117 lb 4.8 oz)   SpO2 98%   BMI 18.93 kg/m    Patti Miguel, [facility]  3/5/2020 8:17 AM    "

## 2020-03-05 NOTE — LETTER
3/5/2020       RE: Yvonne Barron  [address]  [facility] [address]     Dear Colleague,    Thank you for referring your patient, Yvonne Barron, to the [facility] at [facility]. Please see a copy of my visit note below.    [facility]  Dermatology-Rheumatology Clinic  Chico Quinonez MD  2020     Name: Yvonne Barron  MRN: [id number]  Age: 24 year old  : 1995  Referring provider: Ezio Irby    Dermatology Problem List:   1) Chilblains Lupus Erythematosus  2) Systemic lupus erythematosus  3) Graves disease s/p radioactive iodine ablation in 2013, post-ablation hypothyroidism    Encounter Date: 2020   Date of Last Visit: 2020  Orders Only on 2020   Component Date Value Ref Range Status     Lupus Result 2020 Negative  NEG^Negative Final    Comment: (Note)  COMMENTS:  The INR is normal.  APTT ratio is normal.    DRVVT Screen ratio is normal.  Thrombin time is normal.  NEGATIVE TEST; A LUPUS ANTICOAGULANT WAS NOT DETECTED IN THIS  SPECIMEN WITHIN THE LIMITS OF THE TESTING REPERTOIRE.  If the clinical picture is strongly suggestive of an antiphospholipid   syndrome, recommend anticardiolipin and beta-2-glycoprotein (IgG and  IgM) antibody tests.  Carole Christian M.D.  [phone]  2020                  INR =      1.04        Reference range: 0.86-1.14         Thrombin Time=     16.8        Reference range: 13.0-19.0 sec                    APTT:           Ratio       Patient  =      1.08       1:2 Mix  =      N/A       Reference:              Negative: Less than or equal to 1.16            Positive: Greater than or equal to 1.17                             DILUTE REAG[facility] VIPER VENOM TEST:                 Screen Ratio =     0.77       Normal is less than 1.21            Cardiolipin Antibody IgG 2020 <1.6  0.0 - 19.9 GPL-U/mL Final    Negative     Cardiolipin Antibody IgM 2020 <0.2  0.0 - 19.9  "MPL-U/mL Final    Negative     Beta 2 Glycoprotein 1 Antibody IgG 02/17/2020 2.1  <7 U/mL Final    Negative     Beta 2 Glycoprotein 1 Antibody IgM 02/17/2020 <2.9  <7 U/mL Final    Negative       HPI:   Yvonne Barron is a 24 year old female with a hx of Graves disease s/p radioactive iodine ablation in 2013, post-ablation hypothyroidism, who was recently diagnosed with SLE in 11/2018 in a primary care setting who presents for follow up. I last saw the patient on 02/13/2020 at which time she reported painful Raynaud's related color changes in her hands and her feet turning white in cold weather and water. She had been on Prednisone with no relief in the past, and wanted to avoid it. Her Raynaud's attacks last for less than 20 minutes at that time, and Chilblains lesions were present. Plan was to add 5 mg of daily Amlodipine to her current TID 30 mg regimen of Sildenafil. She also endorsed foot pain with walking and was given 5% lidocaine cream.     Today, the patient believes her skin is getting worse. She states that her feet have new crusting which seemed to flare up when it got cold. She is unsure if the clobetasol 0.05% cream and lidocaine are helping. She also states that her hands are also getting worse with crusting on the tip of her 3rd right finger. She states that she is warming her hands and keeping gloves on as much as possible. She denies lightheadedness or other symptoms associated with the Sildenafil.      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 She states that she was taking a lower dose of Cellcept and Prednisone at that time. 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  Per picture on patient's phone from April 12 2019, there was poorly demarcated indurated pink plaques extending from the malar checks involving the " melolabial folds up around the nasal labial crease also overlying the eyelids with extensive facial edema. Crust and arrosion over the plaques as well as a similar plaque on the right forehead. She denies biopsy at that time. She has never tried IVIG or Rituximab.     Otherwise she notes pain in the bones pain particularly at night though her fatigue is somewhat improved. She denies arthralgias.     The patient was seen by Dr. Irby of Rheumatology on 02/13/2020. Impression was of SLE with weight loss and ongoing arthralgia. Plan was to continue Cellcept 1500 mg twice daily.     Review of Systems:   Pertinent items are noted in HPI or as below, remainder of complete ROS is negative.      Active Medications:     Current Outpatient Medications:      amLODIPine (NORVASC) 5 MG tablet, Take 1 tablet (5 mg) by mouth daily, Disp: 90 tablet, Rfl: 1     clobetasol (TEMOVATE) 0.05 % external cream, Apply topically 2 times daily, Disp: 45 g, Rfl: 0     clobetasol (TEMOVATE) 0.05 % external ointment, Apply topically 2 times daily To use over toe ulcers, Disp: 30 g, Rfl: 0     gabapentin (NEURONTIN) 300 MG capsule, Take 1 capsule (300 mg) by mouth 3 times daily, Disp: 90 capsule, Rfl: 3     hydroquinone (CRISTA) 4 % external cream, Apply to hyperpigmented areas twice a day, Disp: 30 g, Rfl: 3     hydroquinone (CRISTA) 4 % external cream, Apply to face BID for 3 months then take 3 month break., Disp: , Rfl:      hydroxychloroquine (PLAQUENIL) 200 MG tablet, Take 1 tablet (200 mg) by mouth 2 times daily, Disp: 60 tablet, Rfl: 3     levothyroxine (SYNTHROID/LEVOTHROID) 75 MCG tablet, Take 75 mcg by mouth daily, Disp: , Rfl:      Lidocaine 5 % CREA, Externally apply 1 Application topically 4 times daily as needed (for pain), Disp: 30 g, Rfl: 3     mycophenolate (GENERIC EQUIVALENT) 500 MG tablet, Take 3 tablets (1,500 mg) by mouth 2 times daily, Disp: 180 tablet, Rfl: 3     sildenafil (REVATIO) 20 MG tablet, Take 1 tablet (20 mg)  "by mouth 3 times daily, Disp: 90 tablet, Rfl: 1     tacrolimus (PROTOPIC) 0.1 % external ointment, Apply to facial rash twice a day, Disp: 60 g, Rfl: 3     tiZANidine (ZANAFLEX) 4 MG tablet, Take 1 tablet (4 mg) by mouth 3 times daily as needed for muscle spasms, Disp: 60 tablet, Rfl: 3      Allergies:   Patient has no known allergies.      Past Medical History:  Hypothyroidism  Lupus     Past Surgical History:  The patient has no pertinent surgical history.      Family History:   The patient has no pertinent family history.        Social History:   Denies tobacco use. Denies alcohol use.      Physical Exam:   BP (!) 87/61   Pulse 113   Temp 98.5  F (36.9  C) (Oral)   Ht 1.676 m (5' 6\")   Wt 53.2 kg (117 lb 4.8 oz)   SpO2 98%   BMI 18.93 kg/m      Wt Readings from Last 4 Encounters:   03/05/20 53.2 kg (117 lb 4.8 oz)   02/13/20 53.8 kg (118 lb 11.2 oz)   02/13/20 53.8 kg (118 lb 11.2 oz)   01/23/20 55.3 kg (122 lb)     General: Well-appearing female in no distress. Alert and oriented.   Skin: Focused exam of the hands, feet, chest, back, scalp, and face was performed.   Findings -   Small reticular purple plaques have resolved though there are now scarred appearing 2-3 mm crusted papule on the distal fingers.   On the left foot: Increasing retiform purple plaques on the toes now with overlying crusting. Almost a vesicular appearance to these reticular plaques that I assume are opening and crusting over. Similar appearance on the right.   Pink minimally indurated 1 cm plaque on the right eyelid moderately well demarcated. There is a hint of a similar plaque medially with crust. hyperpigmented patches on the forehead.   Healed type pigmented patches on the upper chest and a few on the upper back as well.   Good hair growth on the scalp.     Laboratory:   RHEUM RESULTS Latest Ref Rng & Units 2/14/2019 2/4/2020   COMPLEMENT C3 81 - 157 mg/dL 29(L) 73(L)   COMPLEMENT C4 13 - 39 mg/dL 4(L) 7(L)   DNA-DS <10 IU/mL - " 32(H)   SED RATE 0 - 20 mm/h - 48(H)   CRP, INFLAMMATION 0.0 - 8.0 mg/L - <2.9   CK TOTAL 30 - 225 U/L 30 -   AST 0 - 45 U/L 20 15   ALT 0 - 50 U/L 16 11   ALBUMIN 3.4 - 5.0 g/dL 3.4 3.6   WBC 4.0 - 11.0 10e9/L 2.2(L) 1.8(L)   RBC 3.8 - 5.2 10e12/L 4.12 3.99   HGB 11.7 - 15.7 g/dL 11.5(L) 11.3(L)   HCT 35.0 - 47.0 % 36.2 36.4   MCV 78 - 100 fl 88 91   MCHC 31.5 - 36.5 g/dL 31.8 31.0(L)   RDW 10.0 - 15.0 % 14.6 14.2    - 450 10e9/L 127(L) 148(L)   CREATININE 0.52 - 1.04 mg/dL 0.42(L) 0.43(L)   GFR ESTIMATE, IF BLACK >60 mL/min/[1.73:m2] >90 >90   GFR ESTIMATE >60 mL/min/[1.73:m2] >90 >90   HEPATITIS C ANTIBODY NR:Nonreactive Nonreactive -     DNA-ds   Date Value Ref Range Status   02/04/2020 32 (H) <10 IU/mL Final     Comment:     Positive     Beta 2 Glycoprotein 1 Antibody IgG   Date Value Ref Range Status   02/17/2020 2.1 <7 U/mL Final     Comment:     Negative     Beta 2 Glycoprotein 1 Antibody IgM   Date Value Ref Range Status   02/17/2020 <2.9 <7 U/mL Final     Comment:     Negative   ,   Cardiolipin Antibody IgG   Date Value Ref Range Status   02/17/2020 <1.6 0.0 - 19.9 GPL-U/mL Final     Comment:     Negative     Cardiolipin Antibody IgM   Date Value Ref Range Status   02/17/2020 <0.2 0.0 - 19.9 MPL-U/mL Final     Comment:     Negative     Hep B Surface Agn   Date Value Ref Range Status   02/14/2019 Nonreactive NR^Nonreactive Final     Quantiferon-TB Gold Plus Result   Date Value Ref Range Status   02/14/2019 Negative NEG^Negative Final     Comment:     No interferon gamma response to M.tuberculosis antigens was detected.   Infection with M.tuberculosis is unlikely, however a single negative result   does not exclude infection. In patients at high risk for infection, a second   test should be considered  in accordance with the 2017 ATS/IDSA/CDC Clinical Practice Guidelines for   Diagnosis of Tuberculosis in Adults and Children [Edilberto FRANCE et   al.Clin.Infect.Dis. 2017 64(2):111-115].       TB1 Ag  minus Nil Value   Date Value Ref Range Status   02/14/2019 0.01 IU/mL Final     TB2 Ag minus Nil Value   Date Value Ref Range Status   02/14/2019 0.00 IU/mL Final     Mitogen minus Nil Result   Date Value Ref Range Status   02/14/2019 2.65 IU/mL Final     Nil Result   Date Value Ref Range Status   02/14/2019 0.07 IU/mL Final     Assessment and Plan:  #SLE   # Chilblain lupus erythematosus, initial encounter  # Raynaud's phenomenon without gangrene  The patient relates that she is actively warming her hands with moderate success though she does note opening and crusting of sores present during our previous visit.  She is currently taking 30mg of sildenafil daily as well as 5 mg amlodipine. Chilnblains lesions present. We also discussed the use of even more aggressive hand warming, including the use of electric gloves.  Her BP is 80s/60s although she has no side effects.     Maintain amlodipine 5mg daily with current regimen of 30mg TID sildenafil . We will not add further amlodipine given her hypotension today. Ok to continue clobetasol 0.05% cream BID if she would like.  Apply tacrolimus cream twice daily to face.   --  Start Tacrolimus 0.1% external ointment     I explained to the patient's lack of antiphospholipid antibodies seems to point to Chilblain lupus erythematous. The deep plaques could have been discoid lupus. I offered a biopsy and repeat autoimmune labs but explained that this will not  . I explained we could also consider Benlysta, Rituximab, or IV IG. In shared decision making, we discussed holding off on these treatment plans and medication to see what changes as the weather warms. We will, however, trial a course of Gabapentin 300 mg increasing by one pill weekly to three times daily for pain control. Plan is to recheck blood work for further evaluation of autoimmune angioedema.   -- Start Gabapentin 300 mg TID    She also previously had significant angioedema. Will check C1 INH  leve/function and C1q function.       #Hyperpigmentation   - DDx: Plaquenil hyperpigmentation vs post-inflammatory hyperpigmentation. Less likely melasma.    The patient reports hyperpigmentation patches on her forehead. I prescribed hydroquinone 4% cream twice daily.     Orders:   - hydroquinone (CRISTA) 4 % external cream  Dispense: 30 g; Refill: 3   - Complement C1Q Function Assay  - C1-Esterase Inhibitor Level  - C1 esterase inhibitor functional  - tacrolimus (PROTOPIC) 0.1 % external ointment  Dispense: 60 g; Refill: 3  - gabapentin (NEURONTIN) 300 MG capsule  Dispense: 90 capsule; Refill: 3      Follow-up: Return in about 8 weeks (around 4/30/2020).     Scribe Disclosure:  I, Saida Holden, am serving as a scribe to document services personally performed by Chico Quinonez MD at this visit, based upon the provider's statements to me. All documentation has been reviewed by the aforementioned provider prior to being entered into the official medical record.     Provider Disclosure:   The documentation recorded by the scribe accurately reflects the services I personally performed and the decisions made by me.    Chico Quinonez MD, FAAD    Departments of Internal Medicine and Dermatology  [facility]  [phone]

## 2020-03-06 DIAGNOSIS — Z87.898 H/O ANGIOEDEMA: ICD-10-CM

## 2020-03-06 DIAGNOSIS — M32.9 SYSTEMIC LUPUS ERYTHEMATOSUS, UNSPECIFIED SLE TYPE, UNSPECIFIED ORGAN INVOLVEMENT STATUS (H): ICD-10-CM

## 2020-03-06 LAB
ALBUMIN SERPL-MCNC: 3.6 G/DL (ref 3.4–5)
ALBUMIN UR-MCNC: 100 MG/DL
ALT SERPL W P-5'-P-CCNC: 12 U/L (ref 0–50)
APPEARANCE UR: ABNORMAL
AST SERPL W P-5'-P-CCNC: 15 U/L (ref 0–45)
BACTERIA #/AREA URNS HPF: ABNORMAL /HPF
BASOPHILS # BLD AUTO: 0 10E9/L (ref 0–0.2)
BASOPHILS NFR BLD AUTO: 0 %
BILIRUB UR QL STRIP: NEGATIVE
COLOR UR AUTO: YELLOW
CREAT SERPL-MCNC: 0.44 MG/DL (ref 0.52–1.04)
CREAT UR-MCNC: 285 MG/DL
CRP SERPL-MCNC: 3.8 MG/L (ref 0–8)
DIFFERENTIAL METHOD BLD: ABNORMAL
EOSINOPHIL # BLD AUTO: 0 10E9/L (ref 0–0.7)
EOSINOPHIL NFR BLD AUTO: 0 %
ERYTHROCYTE [DISTWIDTH] IN BLOOD BY AUTOMATED COUNT: 14.7 % (ref 10–15)
ERYTHROCYTE [SEDIMENTATION RATE] IN BLOOD BY WESTERGREN METHOD: 56 MM/H (ref 0–20)
GFR SERPL CREATININE-BSD FRML MDRD: >90 ML/MIN/{1.73_M2}
GLUCOSE UR STRIP-MCNC: NEGATIVE MG/DL
HCT VFR BLD AUTO: 39.9 % (ref 35–47)
HGB BLD-MCNC: 12.3 G/DL (ref 11.7–15.7)
HGB UR QL STRIP: NEGATIVE
IMM GRANULOCYTES # BLD: 0 10E9/L (ref 0–0.4)
IMM GRANULOCYTES NFR BLD: 0 %
KETONES UR STRIP-MCNC: NEGATIVE MG/DL
LEUKOCYTE ESTERASE UR QL STRIP: NEGATIVE
LYMPHOCYTES # BLD AUTO: 0.4 10E9/L (ref 0.8–5.3)
LYMPHOCYTES NFR BLD AUTO: 25.1 %
MCH RBC QN AUTO: 28.8 PG (ref 26.5–33)
MCHC RBC AUTO-ENTMCNC: 30.8 G/DL (ref 31.5–36.5)
MCV RBC AUTO: 93 FL (ref 78–100)
MONOCYTES # BLD AUTO: 0.2 10E9/L (ref 0–1.3)
MONOCYTES NFR BLD AUTO: 10.9 %
MUCOUS THREADS #/AREA URNS LPF: PRESENT /LPF
NEUTROPHILS # BLD AUTO: 1.1 10E9/L (ref 1.6–8.3)
NEUTROPHILS NFR BLD AUTO: 64 %
NITRATE UR QL: NEGATIVE
NRBC # BLD AUTO: 0 10*3/UL
NRBC BLD AUTO-RTO: 0 /100
PH UR STRIP: 6 PH (ref 5–7)
PLATELET # BLD AUTO: 165 10E9/L (ref 150–450)
PROT UR-MCNC: 2.24 G/L
PROT/CREAT 24H UR: 0.79 G/G CR (ref 0–0.2)
RBC # BLD AUTO: 4.27 10E12/L (ref 3.8–5.2)
RBC #/AREA URNS AUTO: 2 /HPF (ref 0–2)
SOURCE: ABNORMAL
SP GR UR STRIP: 1.03 (ref 1–1.03)
SQUAMOUS #/AREA URNS AUTO: 2 /HPF (ref 0–1)
UROBILINOGEN UR STRIP-MCNC: 0 MG/DL (ref 0–2)
WBC # BLD AUTO: 1.8 10E9/L (ref 4–11)
WBC #/AREA URNS AUTO: 3 /HPF (ref 0–5)

## 2020-03-06 NOTE — LETTER
April 5, 2020      Yvonne SERRANO Anderson  [address]  [facility] [address]        Dear ,    We are writing to inform you of your test results.    Overall stable labs, there is still some protein the urine. Recommend re-check labs on 4/30 (ordered).    Resulted Orders   Protein  random urine with Creat Ratio   Result Value Ref Range    Protein Random Urine 2.24 g/L    Protein Total Urine g/gr Creatinine 0.79 (H) 0 - 0.2 g/g Cr   UA with Microscopic reflex to Culture   Result Value Ref Range    Color Urine Yellow     Appearance Urine Slightly Cloudy     Glucose Urine Negative NEG^Negative mg/dL    Bilirubin Urine Negative NEG^Negative    Ketones Urine Negative NEG^Negative mg/dL    Specific Gravity Urine 1.029 1.003 - 1.035    Blood Urine Negative NEG^Negative    pH Urine 6.0 5.0 - 7.0 pH    Protein Albumin Urine 100 (A) NEG^Negative mg/dL    Urobilinogen mg/dL 0.0 0.0 - 2.0 mg/dL    Nitrite Urine Negative NEG^Negative    Leukocyte Esterase Urine Negative NEG^Negative    Source Midstream Urine     WBC Urine 3 0 - 5 /HPF    RBC Urine 2 0 - 2 /HPF    Bacteria Urine Few (A) NEG^Negative /HPF    Squamous Epithelial /HPF Urine 2 (H) 0 - 1 /HPF    Mucous Urine Present (A) NEG^Negative /LPF   Erythrocyte sedimentation rate auto   Result Value Ref Range    Sed Rate 56 (H) 0 - 20 mm/h   DNA double stranded antibodies   Result Value Ref Range    DNA-ds 33 (H) <10 IU/mL      Comment:      Positive   CRP inflammation   Result Value Ref Range    CRP Inflammation 3.8 0.0 - 8.0 mg/L   Complement C3   Result Value Ref Range    Complement C3 82 81 - 157 mg/dL   Complement C4   Result Value Ref Range    Complement C4 9 (L) 13 - 39 mg/dL   Creatinine   Result Value Ref Range    Creatinine 0.44 (L) 0.52 - 1.04 mg/dL    GFR Estimate >90 >60 mL/min/[1.73_m2]      Comment:      Non  GFR Calc  Starting 12/18/2018, serum creatinine based estimated GFR (eGFR) will be   calculated using the Chronic Kidney  Disease Epidemiology Collaboration   (CKD-EPI) equation.      GFR Estimate If Black >90 >60 mL/min/[1.73_m2]      Comment:       GFR Calc  Starting 12/18/2018, serum creatinine based estimated GFR (eGFR) will be   calculated using the Chronic Kidney Disease Epidemiology Collaboration   (CKD-EPI) equation.     CBC with platelets differential   Result Value Ref Range    WBC 1.8 (L) 4.0 - 11.0 10e9/L    RBC Count 4.27 3.8 - 5.2 10e12/L    Hemoglobin 12.3 11.7 - 15.7 g/dL    Hematocrit 39.9 35.0 - 47.0 %    MCV 93 78 - 100 fl    MCH 28.8 26.5 - 33.0 pg    MCHC 30.8 (L) 31.5 - 36.5 g/dL    RDW 14.7 10.0 - 15.0 %    Platelet Count 165 150 - 450 10e9/L    Diff Method Automated Method     % Neutrophils 64.0 %    % Lymphocytes 25.1 %    % Monocytes 10.9 %    % Eosinophils 0.0 %    % Basophils 0.0 %    % Immature Granulocytes 0.0 %    Nucleated RBCs 0 0 /100    Absolute Neutrophil 1.1 (L) 1.6 - 8.3 10e9/L    Absolute Lymphocytes 0.4 (L) 0.8 - 5.3 10e9/L    Absolute Monocytes 0.2 0.0 - 1.3 10e9/L    Absolute Eosinophils 0.0 0.0 - 0.7 10e9/L    Absolute Basophils 0.0 0.0 - 0.2 10e9/L    Abs Immature Granulocytes 0.0 0 - 0.4 10e9/L    Absolute Nucleated RBC 0.0    AST   Result Value Ref Range    AST 15 0 - 45 U/L   Albumin level   Result Value Ref Range    Albumin 3.6 3.4 - 5.0 g/dL   ALT   Result Value Ref Range    ALT 12 0 - 50 U/L   Creatinine urine calculation only   Result Value Ref Range    Creatinine Urine 285 mg/dL     Ezio Irby MD

## 2020-03-08 LAB — C1INH FUNCTIONAL/C1INH TOTAL MFR SERPL: 98 %

## 2020-03-09 LAB
C3 SERPL-MCNC: 82 MG/DL (ref 81–157)
C4 SERPL-MCNC: 9 MG/DL (ref 13–39)
DSDNA AB SER-ACNC: 33 IU/ML

## 2020-03-11 ENCOUNTER — HEALTH MAINTENANCE LETTER (OUTPATIENT)
Age: 25
End: 2020-03-11

## 2020-03-13 ENCOUNTER — HOSPITAL ENCOUNTER (EMERGENCY)
Facility: CLINIC | Age: 25
Discharge: HOME | End: 2020-03-14
Attending: EMERGENCY MEDICINE | Admitting: EMERGENCY MEDICINE
Payer: COMMERCIAL

## 2020-03-13 DIAGNOSIS — R10.84 ABDOMINAL PAIN, GENERALIZED: ICD-10-CM

## 2020-03-13 DIAGNOSIS — M79.10 MYALGIA: ICD-10-CM

## 2020-03-13 PROCEDURE — 96361 HYDRATE IV INFUSION ADD-ON: CPT | Performed by: EMERGENCY MEDICINE

## 2020-03-13 PROCEDURE — 83690 ASSAY OF LIPASE: CPT | Performed by: EMERGENCY MEDICINE

## 2020-03-13 PROCEDURE — 25000128 H RX IP 250 OP 636: Performed by: EMERGENCY MEDICINE

## 2020-03-13 PROCEDURE — 25800030 ZZH RX IP 258 OP 636: Performed by: EMERGENCY MEDICINE

## 2020-03-13 PROCEDURE — 99284 EMERGENCY DEPT VISIT MOD MDM: CPT | Mod: 25 | Performed by: EMERGENCY MEDICINE

## 2020-03-13 PROCEDURE — 85025 COMPLETE CBC W/AUTO DIFF WBC: CPT | Performed by: EMERGENCY MEDICINE

## 2020-03-13 PROCEDURE — 99284 EMERGENCY DEPT VISIT MOD MDM: CPT | Mod: Z6 | Performed by: EMERGENCY MEDICINE

## 2020-03-13 PROCEDURE — 80053 COMPREHEN METABOLIC PANEL: CPT | Performed by: EMERGENCY MEDICINE

## 2020-03-13 PROCEDURE — 96374 THER/PROPH/DIAG INJ IV PUSH: CPT | Performed by: EMERGENCY MEDICINE

## 2020-03-13 RX ORDER — OSELTAMIVIR PHOSPHATE 30 MG/1
CAPSULE ORAL 2 TIMES DAILY
COMMUNITY
End: 2020-06-26

## 2020-03-13 RX ORDER — KETOROLAC TROMETHAMINE 15 MG/ML
15 INJECTION, SOLUTION INTRAMUSCULAR; INTRAVENOUS ONCE
Status: COMPLETED | OUTPATIENT
Start: 2020-03-13 | End: 2020-03-13

## 2020-03-13 RX ORDER — SODIUM CHLORIDE 9 MG/ML
1000 INJECTION, SOLUTION INTRAVENOUS CONTINUOUS
Status: DISCONTINUED | OUTPATIENT
Start: 2020-03-14 | End: 2020-03-14 | Stop reason: HOSPADM

## 2020-03-13 RX ADMIN — SODIUM CHLORIDE 1000 ML: 9 INJECTION, SOLUTION INTRAVENOUS at 23:49

## 2020-03-13 RX ADMIN — KETOROLAC TROMETHAMINE 15 MG: 15 INJECTION, SOLUTION INTRAMUSCULAR; INTRAVENOUS at 23:48

## 2020-03-13 ASSESSMENT — MIFFLIN-ST. JEOR: SCORE: 1287.46

## 2020-03-13 NOTE — ED AVS SNAPSHOT
[facility], [place], Emergency Department  [address] [place] [id number]  Phone:  [phone]                                    Yvonne Barron   MRN: [id number]    Department:  [facility], Emergency Department   Date of Visit:  3/13/2020           After Visit Summary Signature Page    I have received my discharge instructions, and my questions have been answered. I have discussed any challenges I see with this plan with the nurse or doctor.    ..........................................................................................................................................  Patient/Patient Representative Signature      ..........................................................................................................................................  Patient Representative Print Name and Relationship to Patient    ..................................................               ................................................  Date                                   Time    ..........................................................................................................................................  Reviewed by Signature/Title    ...................................................              ..............................................  Date                                               Time          22EPIC Rev 08/18

## 2020-03-14 VITALS
BODY MASS INDEX: 18.8 KG/M2 | HEIGHT: 66 IN | OXYGEN SATURATION: 99 % | RESPIRATION RATE: 18 BRPM | DIASTOLIC BLOOD PRESSURE: 66 MMHG | TEMPERATURE: 99 F | HEART RATE: 88 BPM | SYSTOLIC BLOOD PRESSURE: 105 MMHG | WEIGHT: 117 LBS

## 2020-03-14 LAB
ALBUMIN SERPL-MCNC: 3.3 G/DL (ref 3.4–5)
ALBUMIN UR-MCNC: 30 MG/DL
ALP SERPL-CCNC: 73 U/L (ref 40–150)
ALT SERPL W P-5'-P-CCNC: 10 U/L (ref 0–50)
ANION GAP SERPL CALCULATED.3IONS-SCNC: 5 MMOL/L (ref 3–14)
APPEARANCE UR: CLEAR
AST SERPL W P-5'-P-CCNC: 14 U/L (ref 0–45)
BASOPHILS # BLD AUTO: 0 10E9/L (ref 0–0.2)
BASOPHILS NFR BLD AUTO: 0.5 %
BILIRUB SERPL-MCNC: 0.2 MG/DL (ref 0.2–1.3)
BILIRUB UR QL STRIP: NEGATIVE
BUN SERPL-MCNC: 8 MG/DL (ref 7–30)
CALCIUM SERPL-MCNC: 8.3 MG/DL (ref 8.5–10.1)
CHLORIDE SERPL-SCNC: 106 MMOL/L (ref 94–109)
CO2 SERPL-SCNC: 27 MMOL/L (ref 20–32)
COLOR UR AUTO: YELLOW
CREAT SERPL-MCNC: 0.46 MG/DL (ref 0.52–1.04)
DIFFERENTIAL METHOD BLD: ABNORMAL
EOSINOPHIL # BLD AUTO: 0 10E9/L (ref 0–0.7)
EOSINOPHIL NFR BLD AUTO: 0 %
ERYTHROCYTE [DISTWIDTH] IN BLOOD BY AUTOMATED COUNT: 14.6 % (ref 10–15)
FLUAV+FLUBV AG SPEC QL: NEGATIVE
FLUAV+FLUBV AG SPEC QL: NEGATIVE
GFR SERPL CREATININE-BSD FRML MDRD: >90 ML/MIN/{1.73_M2}
GLUCOSE SERPL-MCNC: 92 MG/DL (ref 70–99)
GLUCOSE UR STRIP-MCNC: NEGATIVE MG/DL
HCG UR QL: NEGATIVE
HCT VFR BLD AUTO: 37.8 % (ref 35–47)
HGB BLD-MCNC: 11.8 G/DL (ref 11.7–15.7)
HGB UR QL STRIP: ABNORMAL
HYALINE CASTS #/AREA URNS LPF: 1 /LPF (ref 0–2)
IMM GRANULOCYTES # BLD: 0 10E9/L (ref 0–0.4)
IMM GRANULOCYTES NFR BLD: 0 %
KETONES UR STRIP-MCNC: 10 MG/DL
LEUKOCYTE ESTERASE UR QL STRIP: NEGATIVE
LIPASE SERPL-CCNC: 113 U/L (ref 73–393)
LYMPHOCYTES # BLD AUTO: 0.4 10E9/L (ref 0.8–5.3)
LYMPHOCYTES NFR BLD AUTO: 19.8 %
MCH RBC QN AUTO: 28.8 PG (ref 26.5–33)
MCHC RBC AUTO-ENTMCNC: 31.2 G/DL (ref 31.5–36.5)
MCV RBC AUTO: 92 FL (ref 78–100)
MONOCYTES # BLD AUTO: 0.1 10E9/L (ref 0–1.3)
MONOCYTES NFR BLD AUTO: 6.5 %
MUCOUS THREADS #/AREA URNS LPF: PRESENT /LPF
NEUTROPHILS # BLD AUTO: 1.6 10E9/L (ref 1.6–8.3)
NEUTROPHILS NFR BLD AUTO: 73.2 %
NITRATE UR QL: NEGATIVE
NRBC # BLD AUTO: 0 10*3/UL
NRBC BLD AUTO-RTO: 0 /100
PH UR STRIP: 6.5 PH (ref 5–7)
PLATELET # BLD AUTO: 147 10E9/L (ref 150–450)
POTASSIUM SERPL-SCNC: 3.2 MMOL/L (ref 3.4–5.3)
PROT SERPL-MCNC: 7.8 G/DL (ref 6.8–8.8)
RBC # BLD AUTO: 4.1 10E12/L (ref 3.8–5.2)
RBC #/AREA URNS AUTO: 85 /HPF (ref 0–2)
SODIUM SERPL-SCNC: 137 MMOL/L (ref 133–144)
SOURCE: ABNORMAL
SP GR UR STRIP: 1.03 (ref 1–1.03)
SPECIMEN SOURCE: NORMAL
SQUAMOUS #/AREA URNS AUTO: <1 /HPF (ref 0–1)
UROBILINOGEN UR STRIP-MCNC: NORMAL MG/DL (ref 0–2)
WBC # BLD AUTO: 2.2 10E9/L (ref 4–11)
WBC #/AREA URNS AUTO: 1 /HPF (ref 0–5)

## 2020-03-14 PROCEDURE — 81025 URINE PREGNANCY TEST: CPT | Performed by: EMERGENCY MEDICINE

## 2020-03-14 PROCEDURE — 87804 INFLUENZA ASSAY W/OPTIC: CPT | Performed by: EMERGENCY MEDICINE

## 2020-03-14 PROCEDURE — 81001 URINALYSIS AUTO W/SCOPE: CPT | Performed by: EMERGENCY MEDICINE

## 2020-03-14 RX ORDER — KETOROLAC TROMETHAMINE 10 MG/1
10 TABLET, FILM COATED ORAL EVERY 6 HOURS PRN
Qty: 20 TABLET | Refills: 0 | Status: SHIPPED | OUTPATIENT
Start: 2020-03-14 | End: 2020-06-25

## 2020-03-14 ASSESSMENT — ENCOUNTER SYMPTOMS
VOMITING: 0
NECK PAIN: 0
SORE THROAT: 1
DIARRHEA: 0
FATIGUE: 1
MYALGIAS: 1
CONSTIPATION: 0
ABDOMINAL DISTENTION: 0
EYE PAIN: 0
DYSURIA: 0
CHEST TIGHTNESS: 0
APPETITE CHANGE: 1
COUGH: 0
ABDOMINAL PAIN: 1
ARTHRALGIAS: 1
UNEXPECTED WEIGHT CHANGE: 1
WEAKNESS: 0
FREQUENCY: 0
CHILLS: 0
FEVER: 0
DIFFICULTY URINATING: 0
CONFUSION: 0
SHORTNESS OF BREATH: 0
BACK PAIN: 0
PALPITATIONS: 0
HEADACHES: 0
DIZZINESS: 0
NAUSEA: 0
COLOR CHANGE: 0

## 2020-03-14 NOTE — ED PROVIDER NOTES
[place] EMERGENCY DEPARTMENT ([facility])  3/13/20 Ed 25 11:17 PM    History     Chief Complaint   Patient presents with     Abdominal Pain     lupus flare     Generalized Weakness     The history is provided by the patient and medical records.     Yvonne Barron is a 24 year old female with history of Graves disease and lupus who presents with diffuse abdominal pain. She has had fatigue and generalized body aches but they started to worsen yesterday. Tonight she was unable to lay down due to discomfort. She has abdominal pain focal in her mid abdomen. She notes having been constipated for a month. No change in urination. Her menses started yesterday. She has had a lot of joint pain, especially in her feet. Yesterday she developed flu symptoms with sore throat and painful swallowing and presented to urgent care for this. She had a strep swab done which was was prescribed Tamiflu empirically. At that time she didn't have cough or fever. Patient states taking Tamiflu did help her sore throat. However she has worsening fatigue, diffuse abdominal pain and diffuse body aches. She took extra strength Tylenol but this was ineffective at controlling her pain. She is concerned for lupus flare because symptoms are similar to prior lupus flare one year ago that resulted in a month long hospitalization. She was hospitalized at [facility] for facial cellulitis and sepsis.  She was hospitalized for 2 weeks then discharged to home, only to return to the hospital and was admitted to [facility] for another 2 weeks. She also has had decreased appetite and has had unintentional weight loss. When she does get hungry, she takes one bite of a sandwich, a sip of soup and is full. She had perhaps two whole meals in total over past week. Her rheumatologist and dietitian has been aware and working with her on this. She is on plaquenil, Cellcept for lupus. She is not on prednisone as the side effects are  "difficult to tolerate. No recent sick contacts. No recent travel.      Wt Readings from Last 10 Encounters:   03/13/20 53.1 kg (117 lb)   03/05/20 53.2 kg (117 lb 4.8 oz)   02/13/20 53.8 kg (118 lb 11.2 oz)   02/13/20 53.8 kg (118 lb 11.2 oz)   01/23/20 55.3 kg (122 lb)   02/20/19 59.4 kg (131 lb)   02/14/19 61.9 kg (136 lb 6.4 oz)     I have reviewed the Medications, Allergies, Past Medical and Surgical History, and Social History in the Epic system.    Review of Systems   Constitutional: Positive for appetite change, fatigue and unexpected weight change. Negative for chills and fever.   HENT: Positive for sore throat. Negative for congestion.    Eyes: Negative for pain and visual disturbance.   Respiratory: Negative for cough, chest tightness and shortness of breath.    Cardiovascular: Negative for chest pain and palpitations.   Gastrointestinal: Positive for abdominal pain. Negative for abdominal distention, constipation, diarrhea, nausea and vomiting.   Genitourinary: Negative for difficulty urinating, dysuria, frequency and urgency.   Musculoskeletal: Positive for arthralgias and myalgias. Negative for back pain and neck pain.   Skin: Negative for color change and rash.   Neurological: Negative for dizziness, weakness and headaches.   Psychiatric/Behavioral: Negative for confusion.       Physical Exam   BP: 101/65  Pulse: 100  Temp: 99  F (37.2  C)  Resp: 12  Height: 167.6 cm (5' 6\")  Weight: 53.1 kg (117 lb)  SpO2: 100 %      Physical Exam  Vitals signs and nursing note reviewed.   Constitutional:       General: She is not in acute distress.     Appearance: Normal appearance. She is not ill-appearing or toxic-appearing.   HENT:      Head: Normocephalic and atraumatic.      Nose: Nose normal.      Mouth/Throat:      Mouth: Mucous membranes are moist.   Eyes:      Pupils: Pupils are equal, round, and reactive to light.   Neck:      Musculoskeletal: Normal range of motion. No neck rigidity.   Cardiovascular:      " Rate and Rhythm: Normal rate.      Pulses: Normal pulses.      Heart sounds: Normal heart sounds.   Pulmonary:      Effort: Pulmonary effort is normal. No respiratory distress.      Breath sounds: Normal breath sounds.   Abdominal:      General: Abdomen is flat. There is no distension.      Palpations: There is no mass.      Tenderness: There is no abdominal tenderness. There is no right CVA tenderness, left CVA tenderness or guarding.   Musculoskeletal: Normal range of motion.         General: No swelling or deformity.   Skin:     General: Skin is warm.      Capillary Refill: Capillary refill takes less than 2 seconds.   Neurological:      Mental Status: She is alert and oriented to person, place, and time.   Psychiatric:         Mood and Affect: Mood normal.         ED Course     Results for orders placed or performed during the hospital encounter of 03/13/20 (from the past 24 hour(s))   Comprehensive metabolic panel   Result Value Ref Range    Sodium 137 133 - 144 mmol/L    Potassium 3.2 (L) 3.4 - 5.3 mmol/L    Chloride 106 94 - 109 mmol/L    Carbon Dioxide 27 20 - 32 mmol/L    Anion Gap 5 3 - 14 mmol/L    Glucose 92 70 - 99 mg/dL    Urea Nitrogen 8 7 - 30 mg/dL    Creatinine 0.46 (L) 0.52 - 1.04 mg/dL    GFR Estimate >90 >60 mL/min/[1.73_m2]    GFR Estimate If Black >90 >60 mL/min/[1.73_m2]    Calcium 8.3 (L) 8.5 - 10.1 mg/dL    Bilirubin Total 0.2 0.2 - 1.3 mg/dL    Albumin 3.3 (L) 3.4 - 5.0 g/dL    Protein Total 7.8 6.8 - 8.8 g/dL    Alkaline Phosphatase 73 40 - 150 U/L    ALT 10 0 - 50 U/L    AST 14 0 - 45 U/L   CBC with platelets differential   Result Value Ref Range    WBC 2.2 (L) 4.0 - 11.0 10e9/L    RBC Count 4.10 3.8 - 5.2 10e12/L    Hemoglobin 11.8 11.7 - 15.7 g/dL    Hematocrit 37.8 35.0 - 47.0 %    MCV 92 78 - 100 fl    MCH 28.8 26.5 - 33.0 pg    MCHC 31.2 (L) 31.5 - 36.5 g/dL    RDW 14.6 10.0 - 15.0 %    Platelet Count 147 (L) 150 - 450 10e9/L    Diff Method Automated Method     % Neutrophils 73.2 %     % Lymphocytes 19.8 %    % Monocytes 6.5 %    % Eosinophils 0.0 %    % Basophils 0.5 %    % Immature Granulocytes 0.0 %    Nucleated RBCs 0 0 /100    Absolute Neutrophil 1.6 1.6 - 8.3 10e9/L    Absolute Lymphocytes 0.4 (L) 0.8 - 5.3 10e9/L    Absolute Monocytes 0.1 0.0 - 1.3 10e9/L    Absolute Eosinophils 0.0 0.0 - 0.7 10e9/L    Absolute Basophils 0.0 0.0 - 0.2 10e9/L    Abs Immature Granulocytes 0.0 0 - 0.4 10e9/L    Absolute Nucleated RBC 0.0    Lipase   Result Value Ref Range    Lipase 113 73 - 393 U/L   Influenza A/B antigen swab   Result Value Ref Range    Influenza A/B Agn Specimen Nasopharyngeal     Influenza A Negative NEG^Negative    Influenza B Negative NEG^Negative   UA reflex to Microscopic and Culture    Specimen: Urine Midstream; Midstream Urine   Result Value Ref Range    Color Urine Yellow     Appearance Urine Clear     Glucose Urine Negative NEG^Negative mg/dL    Bilirubin Urine Negative NEG^Negative    Ketones Urine 10 (A) NEG^Negative mg/dL    Specific Gravity Urine 1.027 1.003 - 1.035    Blood Urine Moderate (A) NEG^Negative    pH Urine 6.5 5.0 - 7.0 pH    Protein Albumin Urine 30 (A) NEG^Negative mg/dL    Urobilinogen mg/dL Normal 0.0 - 2.0 mg/dL    Nitrite Urine Negative NEG^Negative    Leukocyte Esterase Urine Negative NEG^Negative    Source Midstream Urine     RBC Urine 85 (H) 0 - 2 /HPF    WBC Urine 1 0 - 5 /HPF    Squamous Epithelial /HPF Urine <1 0 - 1 /HPF    Mucous Urine Present (A) NEG^Negative /LPF    Hyaline Casts 1 0 - 2 /LPF   HCG qualitative urine   Result Value Ref Range    HCG Qual Urine Negative NEG^Negative     Medications   0.9% sodium chloride BOLUS (0 mLs Intravenous Stopped 3/14/20 5116)   ketorolac (TORADOL) injection 15 mg (15 mg Intravenous Given 3/13/20 3215)            Procedures                    Assessments & Plan (with Medical Decision Making)   Patient presents for evaluation of diffuse pain.  Notably, she is complaining of abdominal pain diffuse  myalgias/arthralgias.  Had a sore throat yesterday which has since improved when she was started on Tamiflu at an urgent care.  She denies any additional respiratory symptoms.  On arrival, she is mildly tachycardic.  Otherwise normal vital signs.  She overall appears nontoxic and well-appearing.  On physical exam, she has mild tenderness diffusely without any point tenderness of her abdomen.  No deformities of her upper extremities.  HEENT exam normal.    Differential diagnosis includes influenza, lupus flare, less likely cholecystitis, colitis, pancreatitis, chronic pain.  Plan for CBC, CMP, lipase, urinalysis, pregnancy test, influenza swab, IV fluid, pain medication as needed.    Laboratory work-up is unremarkable.  Patient's leukopenia is consistent with baseline.  Pregnancy negative.  Urinalysis unremarkable.  Influenza test are pending.  On reassessment, patient states she feels much better after Toradol.  Unsure the exact etiology of her symptoms, but do not suspect any pathology at this time.  Could be consistent with a lupus flare.  We will plan for discharge with ketorolac and PCP/rheumatology follow-up.  Will sign out to incoming provider who will follow up on results of influenza test.    I have reviewed the nursing notes.    I have reviewed the findings, diagnosis, plan and need for follow up with the patient.    Discharge Medication List as of 3/14/2020  1:28 AM      START taking these medications    Details   ketorolac (TORADOL) 10 MG tablet Take 1 tablet (10 mg) by mouth every 6 hours as needed for moderate pain, Disp-20 tablet,R-0, Local Print             Final diagnoses:   Abdominal pain, generalized   Myalgia   ICari, am serving as a trained medical scribe to document services personally performed by Hieu Boyd MD based on the provider's statements to me on March 14, 2020.  This document has been checked and approved by the attending provider.    I, Hieu Boyd MD, was  physically present and have reviewed and verified the accuracy of this note documented by Cari Anton, medical scribe.       3/13/2020   [facility], [place], EMERGENCY DEPARTMENT       Hieu Boyd DO  03/14/20 1916

## 2020-03-14 NOTE — ED TRIAGE NOTES
"Triage Assessment & Note:    /65   Pulse 100   Temp 99  F (37.2  C) (Oral)   Resp 12   Ht 1.676 m (5' 6\")   Wt 53.1 kg (117 lb)   LMP 03/11/2020   SpO2 100%   BMI 18.88 kg/m        Patient presents with: Pt comes to triage with report of \"lupus flare.\" Pt reports headache, abdominal pain, weakness, and fever. Pt went to PCP yesterday and had strep test that was negative and given tamiflu, but was unable to tolerate flu test.  No reports of cough, SOB, or CP    Home Treatments/Remedies: Home medications    Febrile / Afebrile: afebrile in triage    Duration of C/o: 2 days    Belkis Miguel RN  March 13, 2020        "

## 2020-03-14 NOTE — DISCHARGE INSTRUCTIONS
Unable to identify the cause of your pain.  Could be related to your chronic lupus and dietary deficiencies.  You have been prescribed ketorolac for pain.  You should also use acetaminophen as we discussed.  Follow up with your doctors as previously advised.  Return with any new or worsening symptoms.

## 2020-03-23 ENCOUNTER — MYC REFILL (OUTPATIENT)
Dept: RHEUMATOLOGY | Facility: CLINIC | Age: 25
End: 2020-03-23

## 2020-03-23 DIAGNOSIS — M32.9 SYSTEMIC LUPUS ERYTHEMATOSUS, UNSPECIFIED SLE TYPE, UNSPECIFIED ORGAN INVOLVEMENT STATUS (H): ICD-10-CM

## 2020-03-23 RX ORDER — HYDROXYCHLOROQUINE SULFATE 200 MG/1
200 TABLET, FILM COATED ORAL 2 TIMES DAILY
Qty: 60 TABLET | Refills: 3 | Status: CANCELLED | OUTPATIENT
Start: 2020-03-23

## 2020-04-05 NOTE — RESULT ENCOUNTER NOTE
Overall stable labs, there is still some protein the urine. Recommend re-check labs on 4/30 (ordered).

## 2020-04-15 ENCOUNTER — TELEPHONE (OUTPATIENT)
Dept: DERMATOLOGY | Facility: CLINIC | Age: 25
End: 2020-04-15

## 2020-04-15 NOTE — TELEPHONE ENCOUNTER
Prior Authorization Retail Medication Request    Medication/Dose: Tacrolimus 0.1% ointment  ICD code (if different than what is on RX):  Z87.898  Previously Tried and Failed:    Rationale:      Insurance Name:    Insurance ID:        Pharmacy Information (if different than what is on RX)  Name:    Phone:

## 2020-04-15 NOTE — TELEPHONE ENCOUNTER
Central Prior Authorization Team   [phone]    PA Initiation    Medication: Tacrolimus 0.1% ointment  Insurance Company: [employer] - Phone [phone] Fax [phone]  Pharmacy Filling the Rx: [facility] - [place], [place] - [address] [place] [address]  Filling Pharmacy Phone: [phone]  Filling Pharmacy Fax: [phone]  Start Date: 4/15/2020

## 2020-04-21 NOTE — TELEPHONE ENCOUNTER
Prior Authorization Not Needed per Insurance    Medication: Tacrolimus 0.1% ointment-PA NOT NEEDED   Insurance Company: [employer] - Phone [phone] Fax [phone]  Expected CoPay: $3    Pharmacy Filling the Rx: [id number] - [place], [place] - [address] AT [facility]  Pharmacy Notified: Yes  Patient Notified: No    Called pharmacy and pharmacy stated that PA is Not Needed and Brand Protopic is covered. Pharmacy stated that they have a paid claim on medication and will notify patient when medication is ready for .

## 2020-04-27 ENCOUNTER — MYC REFILL (OUTPATIENT)
Dept: DERMATOLOGY | Facility: CLINIC | Age: 25
End: 2020-04-27

## 2020-04-27 DIAGNOSIS — L81.9 HYPERPIGMENTATION: ICD-10-CM

## 2020-04-27 LAB
LAB SCANNED RESULT: NORMAL
LAB SCANNED RESULT: NORMAL

## 2020-04-27 RX ORDER — HYDROQUINONE 40 MG/G
CREAM TOPICAL
Qty: 30 G | Refills: 3 | Status: CANCELLED | OUTPATIENT
Start: 2020-04-27

## 2020-04-28 ENCOUNTER — MYC REFILL (OUTPATIENT)
Dept: DERMATOLOGY | Facility: CLINIC | Age: 25
End: 2020-04-28

## 2020-04-28 DIAGNOSIS — L81.9 HYPERPIGMENTATION: ICD-10-CM

## 2020-04-28 RX ORDER — HYDROQUINONE 40 MG/G
CREAM TOPICAL
Qty: 30 G | Refills: 3 | Status: SHIPPED | OUTPATIENT
Start: 2020-04-28 | End: 2020-09-24

## 2020-04-28 NOTE — TELEPHONE ENCOUNTER
hydroquinone (CRISTA) 4 % external cream      Last Written Prescription Date:  3/5/20  Last Fill Quantity: 30 g,   # refills: 3  Last Office Visit : 3/5/20  Future Office visit:  4/30/20    Routing refill request to provider for review/approval because:  Drug not on the [facility], [facility] or [facility] refill protocol   Rx from 3/5/20 was local print, did not get to patient pharmacy

## 2020-04-29 ENCOUNTER — MYC MEDICAL ADVICE (OUTPATIENT)
Dept: RHEUMATOLOGY | Facility: CLINIC | Age: 25
End: 2020-04-29

## 2020-04-30 ENCOUNTER — VIRTUAL VISIT (OUTPATIENT)
Dept: DERMATOLOGY | Facility: CLINIC | Age: 25
End: 2020-04-30
Attending: DERMATOLOGY
Payer: COMMERCIAL

## 2020-04-30 DIAGNOSIS — M32.8 OTHER FORMS OF SYSTEMIC LUPUS ERYTHEMATOSUS, UNSPECIFIED ORGAN INVOLVEMENT STATUS (H): Primary | ICD-10-CM

## 2020-04-30 NOTE — PROGRESS NOTES
Phone [facility] Hospitalist  [facility]   Dermatology-Rheumatology Clinic   Chico Quinonez MD   2020   Name: Yvonne Barron   MRN: [id number]   Age: 24 year old   : 1995   Referring provider: Ezio Irby   Dermatology Problem List:   1) Chilblains Lupus Erythematosus   2) Systemic lupus erythematosus   3) Graves disease s/p radioactive iodine ablation in , post-ablation hypothyroidism   Encounter Date: 2020    Date of Last Visit: 2020     Phone visit 20  Pt notes she has had increasing fatigue and weight loss since our las visit 1 month ago. She is also having back aching pain legs and feet. She thinks it is both muscles and joints. Pain in ankles and knees as well. Pain worse at night.  Up to 7/10 at night. 4/10 during day. + stiffness in morning 5-10 min.  She feels in pain in morning and very fatigued.  She has lost all appetite. Eating very little. She weighs 112 lbs now.  Early march she was 117. She is down 5 lbs.   No nausea. No early satiety Also no hunger. Mood is OK. She does not feel depressed.  She enjoys being outside, which she is not doing. Favorite food is pasta. She has no interest in that. She would get excited about going to movies but cant. No change in sleep. Living right now with Grandmother and aunt. She does not feel like mood is why she is not hungry. Finger and toe pain and ulcers has completely resolved. No purple changes. Hands and feet look normal again.     Current regimen;   Amlodipine 5mg daily  Cellcept 1500mg BID   Plaquanil 200mg twice a day   Sildenafil 30mg BID     Not taking gabapentin. She was taking it as needed and didn't think it was helping. She has been using the hydroquinone twice a day and seeing the hyperpigmentation fading.      She was using the protopic ointment and the rash went away and she started using the protopic ointment again     HPI:  3/5/20  Yvonne Barron is a 24 year old female with a hx of Graves disease s/p  "radioactive iodine ablation in 2013, post-ablation hypothyroidism, who was recently diagnosed with SLE in 11/2018 in a primary care setting who presents for follow up. I last saw the patient on 02/13/2020 at which time she reported painful Raynaud's related color changes in her hands and her feet turning white in cold weather and water. She had been on Prednisone with no relief in the past, and wanted to avoid it. Her Raynaud's attacks last for less than 20 minutes at that time, and Chilblains lesions were present. Plan was to add 5 mg of daily Amlodipine to her current TID 30 mg regimen of Sildenafil. She also endorsed foot pain with walking and was given 5% lidocaine cream.   Today, the patient believes her skin is getting worse. She states that her feet ha  ve new crusting which seemed to flare up when it got cold. She is unsure if the clobetasol 0.05% cream and lidocaine are helping. She also states that her hands are also getting worse with crusting on the tip of her 3rd right finger. She states that she is warming her hands and keeping gloves on as much as possible. She denies lightheadedness or other symptoms associated with the Sildenafil.   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 She states that she was taking a lower dose of Cellcept and Prednisone at that time. 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 Per picture on patient's phone from April 12 2019, there was poorly demarcated indurated pink plaques extending from the malar checks involving the melolabial folds up around the nasal labial crease also overlying the eyelids with extensive facial edema. Crust and arrosion over the plaques as well as a similar plaque on the right forehead. She denies biopsy at that time. She has never tried IVIG or Rituximab.   Otherwise she notes " pain in the bones pain particularly at night though her fatigue is somewhat improved. She denies arthralgias.   The patient was seen by Dr. Irby of Rheumatology on 02/13/2020. Impression was of SLE with weight loss and ongoing arthralgia. Plan was to continue Cellcept 1500 mg twice daily.   Review of Systems:   Pertinent items are noted in HPI or as below, remainder of complete ROS is negative.     Active Medications:   Current Outpatient Medications   Medication     amLODIPine (NORVASC) 5 MG tablet     clobetasol (TEMOVATE) 0.05 % external cream     clobetasol (TEMOVATE) 0.05 % external ointment     gabapentin (NEURONTIN) 300 MG capsule     hydroquinone (CRISTA) 4 % external cream     hydroquinone (CRISTA) 4 % external cream     hydroxychloroquine (PLAQUENIL) 200 MG tablet     levothyroxine (SYNTHROID/LEVOTHROID) 75 MCG tablet     Lidocaine 5 % CREA     mycophenolate (GENERIC EQUIVALENT) 500 MG tablet     sildenafil (REVATIO) 20 MG tablet     tacrolimus (PROTOPIC) 0.1 % external ointment     tiZANidine (ZANAFLEX) 4 MG tablet     ketorolac (TORADOL) 10 MG tablet     oseltamivir (TAMIFLU) 30 MG capsule     No current facility-administered medications for this visit.       Allergies:   Patient has no known allergies.   Past Medical History:   Hypothyroidism   Lupus   Past Surgical History:   The patient has no pertinent surgical history.   Family History:   The patient has no pertinent family history.   Social History:   Denies tobacco use. Denies alcohol use.     Physical Exam: Not done    Assessment and Plan:   #SLE   # Chilblain lupus erythematosus, initial encounter   # Raynaud's phenomenon without gangrene   - Chillblalins resolved   - Continue cellcept, plaquanil, norvasc and sildenafil  - We discussed now that that the weather is warmer, holding the sildenafil. She will consider this.   - Now having a plaque on the eyelid, ? Discoid lesion  - Restart protopic ointment   - She increased her dose in [facility]  in March showed thrombocytopniea (mild and leukopenia, with pretty low lymphs  - We discussed risks and benefits of checking labs right now. She is aware of her very low lymphocytes and that she needs to be very careful right now. Recommend good hand heigene and social distancing. We discussed risk and benefits if checking labs right now. We decided to hold off until early June and f/u with us in end of June.    - She is not on birth control currently. She is not currently sexually active. SHe knows she cant get pregnant on cellcept.    #Hyperpigmentation   - Continue hydroquinone for 3 months, then take 1-3 month break and then can restart  - DDx: Plaquenil hyperpigmentation vs post-inflammatory hyperpigmentation. Less likely melasma.     # Arthritis/Myalgias/Fatigue/Weight loss  - She had to quit her job because her fatigue was so bad. Goes along with the pain. Will re-start gabapentin and increase to 600mg TID.  Warned about fatigue. Increase by 300mg every 3 days.    - Start ensure three times a day with meals  - Could consider provigil in future for fatigue    Will plan on seeing back in clinic June 25th when she sees Dr Irby    Phone visit time; 32 min    Chico Quinonez MD, FAAD, FACP    Departments of Internal Medicine and Dermatology  [facility]  [phone]

## 2020-05-01 NOTE — TELEPHONE ENCOUNTER
Form Request Documentation    Name of Form: [place] [employer]employment    Date Received: 4/30/2020    Mode Received: via Startapp     Provider: Surekha    Date Needed By: not specified    Date Given to Provider: Due to the current COVID 19 outbreak, only one provider is in clinic one time a week. I will notify Dr. Irby when form is ready for them to review and sign.     Startapp message sent to patient of this information as well as the questions we need answered prior to filling out the form.        Mirna Gregory CMA   5/1/2020 9:37 AM

## 2020-05-05 NOTE — TELEPHONE ENCOUNTER
Form is completed, signed and faxed to MAGAN Saab at [phone]. Patient notified via [employer] message.    Mirna Gregory CMA   5/5/2020 8:19 AM

## 2020-05-27 ENCOUNTER — TELEPHONE (OUTPATIENT)
Dept: RHEUMATOLOGY | Facility: CLINIC | Age: 25
End: 2020-05-27

## 2020-05-27 NOTE — TELEPHONE ENCOUNTER
[facility] Call Center    Phone Message    May a detailed message be left on voicemail: yes     Reason for Call: Other: Patient called as she needs her Lupus lab orders placed, so she can come in for testing.  Please follow up with patient once lab orders are in system.  Thank you!     Action Taken: Message routed to:  [facility]    Travel Screening: Not Applicable

## 2020-05-28 NOTE — TELEPHONE ENCOUNTER
Lab orders are in chart. Patient informed via NOWBOX message.     Mirna Gregory CMA   5/28/2020 10:17 AM

## 2020-05-29 ENCOUNTER — MYC REFILL (OUTPATIENT)
Dept: RHEUMATOLOGY | Facility: CLINIC | Age: 25
End: 2020-05-29

## 2020-05-29 DIAGNOSIS — M79.604 PAIN IN BOTH LOWER EXTREMITIES: ICD-10-CM

## 2020-05-29 DIAGNOSIS — M79.605 PAIN IN BOTH LOWER EXTREMITIES: ICD-10-CM

## 2020-06-01 NOTE — TELEPHONE ENCOUNTER
tiZANidine (ZANAFLEX) 4 MG tablet      Last Written Prescription Date:  2/13/20  Last Fill Quantity: 60,   # refills: 3  Last Office Visit : 2/13/20  Future Office visit:  6/25/20    Routing refill request to provider for review/approval because:  Drug not on the [facility], [facility] or [facility] refill protocol   Is requesting refill be sent to a different pharmacy from where RX was sent 2/13/20

## 2020-06-12 ENCOUNTER — MYC REFILL (OUTPATIENT)
Dept: RHEUMATOLOGY | Facility: CLINIC | Age: 25
End: 2020-06-12

## 2020-06-12 DIAGNOSIS — M79.604 PAIN IN BOTH LOWER EXTREMITIES: ICD-10-CM

## 2020-06-12 DIAGNOSIS — M32.9 SYSTEMIC LUPUS ERYTHEMATOSUS, UNSPECIFIED SLE TYPE, UNSPECIFIED ORGAN INVOLVEMENT STATUS (H): ICD-10-CM

## 2020-06-12 DIAGNOSIS — M79.605 PAIN IN BOTH LOWER EXTREMITIES: ICD-10-CM

## 2020-06-12 RX ORDER — MYCOPHENOLATE MOFETIL 500 MG/1
1500 TABLET ORAL 2 TIMES DAILY
Qty: 180 TABLET | Refills: 3 | Status: SHIPPED | OUTPATIENT
Start: 2020-06-12 | End: 2020-10-17

## 2020-06-12 RX ORDER — HYDROXYCHLOROQUINE SULFATE 200 MG/1
200 TABLET, FILM COATED ORAL 2 TIMES DAILY
Qty: 60 TABLET | Refills: 3 | Status: SHIPPED | OUTPATIENT
Start: 2020-06-12 | End: 2020-06-26

## 2020-06-12 NOTE — TELEPHONE ENCOUNTER
tiZANidine (ZANAFLEX) 4 MG tablet      Last Written Prescription Date:  6/1/20  Last Fill Quantity: 60,   # refills: 0  Last Office Visit : 2/13/20  Future Office visit:  6/25/20      hydroxychloroquine (PLAQUENIL) 200 MG tablet   Last Written Prescription Date:  1/23/20  Last Fill Quantity: 60,   # refills: 3    Eye exam:  9/13/19     mycophenolate (GENERIC EQUIVALENT) 500 MG tablet    Last Written Prescription Date:  1/23/20  Last Fill Quantity: 180,   # refills: 3      CBC RESULTS:   Recent Labs   Lab Test 03/13/20  2343   WBC 2.2*   RBC 4.10   HGB 11.8   HCT 37.8   MCV 92   MCH 28.8   MCHC 31.2*   RDW 14.6   *       Creatinine   Date Value Ref Range Status   03/13/2020 0.46 (L) 0.52 - 1.04 mg/dL Final   ]    Liver Function Studies -   Recent Labs   Lab Test 03/13/20  2343   PROTTOTAL 7.8   ALBUMIN 3.3*   BILITOTAL 0.2   ALKPHOS 73   AST 14   ALT 10     Routing refill request to provider for review/approval because:  tiZANidine  Not on protocol    mycophenolate  Labs due. Provider review, dosing alert

## 2020-06-23 ENCOUNTER — TELEPHONE (OUTPATIENT)
Dept: RHEUMATOLOGY | Facility: CLINIC | Age: 25
End: 2020-06-23

## 2020-06-23 NOTE — TELEPHONE ENCOUNTER
[facility] Call Center    Phone Message    May a detailed message be left on voicemail: yes     Reason for Call: Other: Patient is calling in to see if her lab orders have been faxed to the lab she requested. She wants them faxed to [facility] in [facility] fax number is [phone]. Please follow up with the patient and let her know if this was done or not. Thank you.    Action Taken: Message routed to:  [facility] rheum    Travel Screening: Not Applicable

## 2020-06-25 ENCOUNTER — VIRTUAL VISIT (OUTPATIENT)
Dept: RHEUMATOLOGY | Facility: CLINIC | Age: 25
End: 2020-06-25
Attending: INTERNAL MEDICINE
Payer: COMMERCIAL

## 2020-06-25 DIAGNOSIS — M32.9 SYSTEMIC LUPUS ERYTHEMATOSUS, UNSPECIFIED SLE TYPE, UNSPECIFIED ORGAN INVOLVEMENT STATUS (H): Primary | ICD-10-CM

## 2020-06-25 DIAGNOSIS — R63.4 WEIGHT LOSS: ICD-10-CM

## 2020-06-25 ASSESSMENT — PAIN SCALES - GENERAL: PAINLEVEL: NO PAIN (0)

## 2020-06-25 NOTE — PROGRESS NOTES
"Yvonne Barron is a 25 year old female who is being evaluated via a billable video visit.      The patient has been notified of following:     \"This video visit will be conducted via a call between you and your physician/provider. We have found that certain health care needs can be provided without the need for an in-person physical exam.  This service lets us provide the care you need with a video conversation.  If a prescription is necessary we can send it directly to your pharmacy.  If lab work is needed we can place an order for that and you can then stop by our lab to have the test done at a later time.    Video visits are billed at different rates depending on your insurance coverage.  Please reach out to your insurance provider with any questions.    If during the course of the call the physician/provider feels a video visit is not appropriate, you will not be charged for this service.\"    Patient has given verbal consent for Video visit? Yes    Will anyone else be joining your video visit? No        Video-Visit Details    Type of service:  Video Visit    Video Start Time: 8:07 AM  Video End Time: 8:33 AM    Originating Location (pt. Location): Home    Distant Location (provider location):  [facility] RHEUMATOLOGY     Platform used for Video Visit: Raghu Irby MD          "

## 2020-06-25 NOTE — TELEPHONE ENCOUNTER
Lab orders faxed again. Confirmed with patient that they were received.    Mirna Gregory CMA   6/25/2020 3:04 PM

## 2020-06-25 NOTE — TELEPHONE ENCOUNTER
Pt is at [facility] and they state they don't have the orders. Please fax to same # [phone]. I confirmed this is the correct #. Please let the pt know when this has been done. She is at the lab right now. Thanks,.

## 2020-06-25 NOTE — LETTER
"6/25/2020       RE: Yvonne Barron  [address]  [place] [facility] [address]     Dear Colleague,    Thank you for referring your patient, Yvonne Barron, to the [facility] RHEUMATOLOGY at [facility]. Please see a copy of my visit note below.    Yvonne Barron is a 25 year old female who is being evaluated via a billable video visit.      The patient has been notified of following:     \"This video visit will be conducted via a call between you and your physician/provider. We have found that certain health care needs can be provided without the need for an in-person physical exam.  This service lets us provide the care you need with a video conversation.  If a prescription is necessary we can send it directly to your pharmacy.  If lab work is needed we can place an order for that and you can then stop by our lab to have the test done at a later time.    Video visits are billed at different rates depending on your insurance coverage.  Please reach out to your insurance provider with any questions.    If during the course of the call the physician/provider feels a video visit is not appropriate, you will not be charged for this service.\"    Patient has given verbal consent for Video visit? Yes    Will anyone else be joining your video visit? No        Video-Visit Details    Type of service:  Video Visit    Video Start Time: 8:07 AM  Video End Time: 8:33 AM    Originating Location (pt. Location): Home    Distant Location (provider location):  [facility] RHEUMATOLOGY     Platform used for Video Visit: Raghu Irby MD            Rheumatology Clinic F/U Virtual Visit Note    Last seen: 2/13/2020    DOS: 6/25/2020    (this is a video visit due to COVID-19 outbreak), patient agreed      Reason for evaluation: SLE    SUBJECTIVE:  The patient is a 22 yo woman with a hx of Graves disease s/p radioactive iodine ablation in 2013, post-ablation hypothyroidism, who was " recently diagnosed with SLE in 11/2018 in a primary care setting presents to establish rheumatology care. She initially presented with raynaud's, rash over face and legs, nasal and oral ulcers, weight loss of 30 lbs, and severe fatigue. Was found to have Leukopenia, thrombocytopenia (130), sed rate 45, SHANDA 1:320, Sm>8, SSA>8, RNP>8, DS DNA 17, low C3 C4. She was started on hydroxychloroquine 300/day, prednisone 30 mg/day, and subsequently - verapamil for Raynauds. About 2 weeks after beginning medications, she developed lapses in memory - could not remember whole days, or an e-mail she had sent. Found herself driving to [place] for no clear reason, until she ran out of gas. She became concerned that these symptoms were due to new medications and stopped taking them. She started feeling more lucid soon after.    Patient denies any fevers, chills, vision changes, seizures, focal weakness or numbness.  Denies any arthralgias, morning stiffness, myalgias,  Alopecia, vitiligo, ear fullness or drainage.   No cough or dyspnea, pleuricy, no abdominal pain, nausea, diarrhea, no hematuria, no history of blood clots.         1/23/2020: Last seen by Dr. Wiley in 2/2019, then transferred care to [facility] [place] [place], now wants to re-establish here as it's closer to her school.    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    Her hair is growing back. Had oral/nasal ulcers before not recently.    No fatigue.    Her feet hurt, chronic, worse over past month. Pain gets worse towards the end of the day after walking, gets worse with driving.    No CP, SOB or cough.    Sometimes has mild nausea. Lost weight with Dx of lupus, but gained some back, still has loss of appetite.    No headaches, seizures.    Has raynaud's, fingers turn red, white, blue. It causes numbness, tingling.    Reports having sores on the bottom of her feet. Has it since Nov 2019 with drop in T.      Currently off  prednsione.    On  mg bid, had eye exam in 8/2019.    Off AZA, took it only for one month.    On cellcept about a yr.      Started norvasc 2.5 mg qd in 12/2019 and it has not helped with raynaud's.     2/13/2020: At initial visit with me, cellcept was increased to 1.5 gr bid, revatio was added for feet raynaud's/chilblain. She is being seen by DR. Quinonez today for chill julia lesions over toes, also has upcoming appointment with dietitian for weight loss. Her biggest complaint is B/L leg myalgia.    Today 6/25/2020:     Her only complaint is weight loss. Feels bloated and full after eating.    She continues to lose weight, went down from 117 to 110 lbs since last visit. Gabapentin has helped with generalized body ache, sometimes has body ache, extra strennght tylenol helps as well, pain is worse at night. For the past 3 wk, the top of her foot was swollen without pain, much the same throughout the day. She had edema in her face at the time of lupus Dx. Plantar rash is better. Moved to [place] 3 months ago. Went there for spring break and stayed there.     She will be back to [place] at the end of Aug.    No oral ulcers, nasal ulcers, hair loss, CP or SOB. Raynaud s is gone and is not using norvasc and revatio now.    Has been taking iron and vid D.    Eye exam was done at end of last year, at [facility] [place].    Additional pertinent labs:    RF CCP neg  11/15 hypothyroid TSH 15  Marked hypokalemia  EBV IgG high, IgM neg Parvo B19 IgG high    Family hx negative for autoimmune disease, positive for diabetes and hypertension.      REVIEW OF SYMPTOMS:  A comprehensive ROS was done. Positives are per HPI.        HISTORY REVIEW:  Past Medical History:   Diagnosis Date     Hypothyroidism      Lupus (systemic lupus erythematosus) (H)      No past surgical history on file.  No family history on file.  Social History     Socioeconomic History     Marital status: Single     Spouse name: Not on file     Number of children:  Not on file     Years of education: Not on file     Highest education level: Not on file   Occupational History     Not on file   Social Needs     Financial resource strain: Not on file     Food insecurity     Worry: Not on file     Inability: Not on file     Transportation needs     Medical: Not on file     Non-medical: Not on file   Tobacco Use     Smoking status: Never Smoker     Smokeless tobacco: Never Used   Substance and Sexual Activity     Alcohol use: No     Frequency: Never     Drug use: No     Sexual activity: Never   Lifestyle     Physical activity     Days per week: Not on file     Minutes per session: Not on file     Stress: Not on file   Relationships     Social connections     Talks on phone: Not on file     Gets together: Not on file     Attends Nondenominational service: Not on file     Active member of club or organization: Not on file     Attends meetings of clubs or organizations: Not on file     Relationship status: Not on file     Intimate partner violence     Fear of current or ex partner: Not on file     Emotionally abused: Not on file     Physically abused: Not on file     Forced sexual activity: Not on file   Other Topics Concern     Not on file   Social History Narrative     Not on file     There is no problem list on file for this patient.    No Known Allergies    I reviewed the Current Medications:  Current Outpatient Medications   Medication Sig Dispense Refill     gabapentin (NEURONTIN) 300 MG capsule Increase by 300mg every 3 days up to 600mg three times daily as needed 90 capsule 3     hydroquinone (CRISTA) 4 % external cream Apply to hyperpigmented areas twice a day 30 g 3     hydroxychloroquine (PLAQUENIL) 200 MG tablet 200 mg bid on Mon/Wed/Fri and 200 mg a day the rest of the week 60 tablet 0     levothyroxine (SYNTHROID/LEVOTHROID) 75 MCG tablet Take 75 mcg by mouth daily       mycophenolate (GENERIC EQUIVALENT) 500 MG tablet Take 3 tablets (1,500 mg) by mouth 2 times daily 180 tablet  3     tiZANidine (ZANAFLEX) 4 MG tablet Take 1 tablet (4 mg) by mouth 3 times daily as needed for muscle spasms 60 tablet 0     amLODIPine (NORVASC) 5 MG tablet Take 1 tablet (5 mg) by mouth daily 90 tablet 1     hydroquinone (CRISTA) 4 % external cream Apply to face BID for 3 months then take 3 month break.       sildenafil (REVATIO) 20 MG tablet Take 1 tablet (20 mg) by mouth 3 times daily 90 tablet 1     tacrolimus (PROTOPIC) 0.1 % external ointment Apply to facial rash twice a day (Patient not taking: Reported on 6/25/2020) 60 g 3         I personally reviewed and independently visualized the following images:  1/21/19  CT head without contrast.    COMPARISON:  None available     FINDINGS:  The ventricles are in proportion to the cortical sulci consistent with patient`s stated age. The gray-white matter junction is distinct. Negative for acute intracranial hemorrhage, extra-axial fluid collection or midline shift. The basal cisterns are intact.     There is mild mucosal thickening of the ethmoid sinuses. The remainder of the visualized paranasal sinuses are clear. The bony calvarium is intact.     IMPRESSION:  Unremarkable noncontrast head CT without acute intracranial abnormality.    Component      Latest Ref Rng & Units 3/6/2020   Color Urine       Yellow   Appearance Urine       Slightly Cloudy   Glucose Urine      NEG:Negative mg/dL Negative   Bilirubin Urine      NEG:Negative Negative   Ketones Urine      NEG:Negative mg/dL Negative   Specific Gravity Urine      1.003 - 1.035 1.029   Blood Urine      NEG:Negative Negative   pH Urine      5.0 - 7.0 pH 6.0   Protein Albumin Urine      NEG:Negative mg/dL 100 (A)   Urobilinogen mg/dL      0.0 - 2.0 mg/dL 0.0   Nitrite Urine      NEG:Negative Negative   Leukocyte Esterase Urine      NEG:Negative Negative   Source       Midstream Urine   WBC Urine      0 - 5 /HPF 3   RBC Urine      0 - 2 /HPF 2   Bacteria Urine      NEG:Negative /HPF Few (A)   Squamous  Epithelial /HPF Urine      0 - 1 /HPF 2 (H)   Mucous Urine      NEG:Negative /LPF Present (A)   Protein Random Urine      g/L 2.24   Protein Total Urine g/gr Creatinine      0 - 0.2 g/g Cr 0.79 (H)   Sed Rate      0 - 20 mm/h 56 (H)   DNA-ds      <10 IU/mL 33 (H)   CRP Inflammation      0.0 - 8.0 mg/L 3.8   Complement C3      81 - 157 mg/dL 82   Complement C4      13 - 39 mg/dL 9 (L)   Creatinine Urine      mg/dL 285     PHYSICAL EXAM  Constitutional: pleasant, NAD  Eyes: nl EOM, conjunctiva, sclera  ENT: nl external ears, nose, lips  Resp: no resp distress  MS: no active synovitis  Skin: no rash  Neuro: nl cranial nerves  Psych: nl  affect.      ASSESSMENT and PLAN:  SLE with weight loss, ongoing arthralgia, active lupus serology and what seems to be new chill julia lesions over toes. Labs in 12/2019 done at [facility] [place] show leukopenia, borderline pos anti-DNA and low C3/C4. Discussed Dx, Tx plan, labs. Skin lesions over fingers and feet completely resolved. She has no lupus sx or flare ups, but weight loss is an ongoing issue. Labs in 3/2020 was concerning  For LN given ur pr/cr 0.7, pos anti-DNA, low C4. She was supposed to repeat labs in 4/2020, has not been done yet.    Today plan of care:      Cotinue cellcept 3 tabs of 500 mg twice a day, was informed that it is teratogenic. Labs [id number]     Continue revatio for Raynaud's    Cut back on plaquenil 2 tabs a day M/W/F, 1 tab a day the rest of the week (adjuted the dose given weight loss) with annual eye exam    GI referral for weight loss, to r/o bacterial overgrowth and gastroparesis    Labs at [facility] in [place], will fax orders    Return in 3-4 months      Orders Placed This Encounter   Procedures     ALT     Albumin level     AST     CBC with platelets differential     Creatinine     Complement C4     Complement C3     CRP inflammation     DNA double stranded antibodies     Erythrocyte sedimentation rate auto     UA with Microscopic reflex to Culture     Protein   random urine with Creat Ratio     Creatinine random urine     GASTROENTEROLOGY ADULT REF CONSULT ONLY       Video visit: 8:07 AM-8:33 AM      Again, thank you for allowing me to participate in the care of your patient.      Sincerely,    Ezio Irby MD

## 2020-06-25 NOTE — TELEPHONE ENCOUNTER
[facility] Call Center    Phone Message    May a detailed message be left on voicemail: yes     Reason for Call: Other: Pt calling in to check the status of her request. Pt is going to have her labs done today and she is needing them all sent over to [facility] as listed. PT is needing all Lupus panels and also iron and B3. Please follow up with pt once they have been faxed over so she knows she can go to her appointment at 1pm. Thank you      Action Taken: Message routed to:  [facility] ([facility]): rheum    Travel Screening: Not Applicable

## 2020-06-25 NOTE — PATIENT INSTRUCTIONS
Plaquenil 2 tabs a day M/W/F, 1 tab a day the rest of the week    GI referral for weight loss    Labs    Return in 3-4 months

## 2020-06-26 RX ORDER — HYDROXYCHLOROQUINE SULFATE 200 MG/1
TABLET, FILM COATED ORAL
Qty: 60 TABLET | Refills: 0
Start: 2020-06-26 | End: 2020-09-16

## 2020-07-01 NOTE — TELEPHONE ENCOUNTER
RECORDS RECEIVED FROM: Internal    DATE RECEIVED: 7/27/20    NOTES STATUS DETAILS   OFFICE NOTE from referring provider Internal Virtual OV 6/25/20         PERTINENT LABS Internal      REFERRAL INFORMATION    Date referral was placed: 7/27/20   Date all records received:    Date records were scanned into EPIC:    Date records were sent to Provider to review:    Date and recommendation received from provider:  LETTER SENT  SCHEDULE APPOINTMENT   Date patient was contacted to schedule: 6/30/20

## 2020-07-20 NOTE — PROGRESS NOTES
Rheumatology Clinic F/U Virtual Visit Note    Last seen: 2/13/2020    DOS: 6/25/2020    (this is a video visit due to COVID-19 outbreak), patient agreed      Reason for evaluation: SLE    SUBJECTIVE:  The patient is a 24 yo woman with a hx of Graves disease s/p radioactive iodine ablation in 2013, post-ablation hypothyroidism, who was recently diagnosed with SLE in 11/2018 in a primary care setting presents to establish rheumatology care. She initially presented with raynaud's, rash over face and legs, nasal and oral ulcers, weight loss of 30 lbs, and severe fatigue. Was found to have Leukopenia, thrombocytopenia (130), sed rate 45, SHANDA 1:320, Sm>8, SSA>8, RNP>8, DS DNA 17, low C3 C4. She was started on hydroxychloroquine 300/day, prednisone 30 mg/day, and subsequently - verapamil for Raynauds. About 2 weeks after beginning medications, she developed lapses in memory - could not remember whole days, or an e-mail she had sent. Found herself driving to [place] for no clear reason, until she ran out of gas. She became concerned that these symptoms were due to new medications and stopped taking them. She started feeling more lucid soon after.    Patient denies any fevers, chills, vision changes, seizures, focal weakness or numbness.  Denies any arthralgias, morning stiffness, myalgias,  Alopecia, vitiligo, ear fullness or drainage.   No cough or dyspnea, pleuricy, no abdominal pain, nausea, diarrhea, no hematuria, no history of blood clots.         1/23/2020: Last seen by Dr. Wiley in 2/2019, then [place] [facility] now wants to re-establish here as it's closer to her school.    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    Her hair is growing back. Had oral/nasal ulcers before not recently.    No fatigue.    Her feet hurt, chronic, worse over past month. Pain gets worse towards the end of the day after walking, gets worse with driving.    No CP,  SOB or cough.    Sometimes has mild nausea. Lost weight with Dx of lupus, but gained some back, still has loss of appetite.    No headaches, seizures.    Has raynaud's, fingers turn red, white, blue. It causes numbness, tingling.    Reports having sores on the bottom of her feet. Has it since Nov 2019 with drop in T.      Currently off prednsione.    On  mg bid, had eye exam in 8/2019.    Off AZA, took it only for one month.    On cellcept about a yr.      Started norvasc 2.5 mg qd in 12/2019 and it has not helped with raynaud's.     2/13/2020: At initial visit with me, cellcept was increased to 1.5 gr bid, revatio was added for feet raynaud's/chilblain. She is being seen by DR. Quinonez today for chill julia lesions over toes, also has upcoming appointment with dietitian for weight loss. Her biggest complaint is B/L leg myalgia.    Today 6/25/2020:     Her only complaint is weight loss. Feels bloated and full after eating.    She continues to lose weight, went down from 117 to 110 lbs since last visit. Gabapentin has helped with generalized body ache, sometimes has body ache, extra strennght tylenol helps as well, pain is worse at night. For the past 3 wk, the top of her foot was swollen without pain, much the same throughout the day. She had edema in her face at the time of lupus Dx. Plantar rash is better. Moved to [place] 3 months ago. Went there for spring break and stayed there.     She will be back to [place] at the end of Aug.    No oral ulcers, nasal ulcers, hair loss, CP or SOB. Raynaud s is gone and is not using norvasc and revatio now.    Has been taking iron and vid D.    Eye exam was done at end of last year, at [facility] [place].    Additional pertinent labs:    RF CCP neg  11/15 hypothyroid TSH 15  Marked hypokalemia  EBV IgG high, IgM neg Parvo B19 IgG high    Family hx negative for autoimmune disease, positive for diabetes and hypertension.      REVIEW OF SYMPTOMS:  A comprehensive ROS was done.  Positives are per HPI.        HISTORY REVIEW:  Past Medical History:   Diagnosis Date     Hypothyroidism      Lupus (systemic lupus erythematosus) (H)      No past surgical history on file.  No family history on file.  Social History     Socioeconomic History     Marital status: Single     Spouse name: Not on file     Number of children: Not on file     Years of education: Not on file     Highest education level: Not on file   Occupational History     Not on file   Social Needs     Financial resource strain: Not on file     Food insecurity     Worry: Not on file     Inability: Not on file     Transportation needs     Medical: Not on file     Non-medical: Not on file   Tobacco Use     Smoking status: Never Smoker     Smokeless tobacco: Never Used   Substance and Sexual Activity     Alcohol use: No     Frequency: Never     Drug use: No     Sexual activity: Never   Lifestyle     Physical activity     Days per week: Not on file     Minutes per session: Not on file     Stress: Not on file   Relationships     Social connections     Talks on phone: Not on file     Gets together: Not on file     Attends Yazidi service: Not on file     Active member of club or organization: Not on file     Attends meetings of clubs or organizations: Not on file     Relationship status: Not on file     Intimate partner violence     Fear of current or ex partner: Not on file     Emotionally abused: Not on file     Physically abused: Not on file     Forced sexual activity: Not on file   Other Topics Concern     Not on file   Social History Narrative     Not on file     There is no problem list on file for this patient.    No Known Allergies    I reviewed the Current Medications:  Current Outpatient Medications   Medication Sig Dispense Refill     gabapentin (NEURONTIN) 300 MG capsule Increase by 300mg every 3 days up to 600mg three times daily as needed 90 capsule 3     hydroquinone (CRISTA) 4 % external cream Apply to hyperpigmented areas  twice a day 30 g 3     hydroxychloroquine (PLAQUENIL) 200 MG tablet 200 mg bid on Mon/Wed/Fri and 200 mg a day the rest of the week 60 tablet 0     levothyroxine (SYNTHROID/LEVOTHROID) 75 MCG tablet Take 75 mcg by mouth daily       mycophenolate (GENERIC EQUIVALENT) 500 MG tablet Take 3 tablets (1,500 mg) by mouth 2 times daily 180 tablet 3     tiZANidine (ZANAFLEX) 4 MG tablet Take 1 tablet (4 mg) by mouth 3 times daily as needed for muscle spasms 60 tablet 0     amLODIPine (NORVASC) 5 MG tablet Take 1 tablet (5 mg) by mouth daily 90 tablet 1     hydroquinone (CRISTA) 4 % external cream Apply to face BID for 3 months then take 3 month break.       sildenafil (REVATIO) 20 MG tablet Take 1 tablet (20 mg) by mouth 3 times daily 90 tablet 1     tacrolimus (PROTOPIC) 0.1 % external ointment Apply to facial rash twice a day (Patient not taking: Reported on 6/25/2020) 60 g 3         I personally reviewed and independently visualized the following images:  1/21/19  CT head without contrast.    COMPARISON:  None available     FINDINGS:  The ventricles are in proportion to the cortical sulci consistent with patient`s stated age. The gray-white matter junction is distinct. Negative for acute intracranial hemorrhage, extra-axial fluid collection or midline shift. The basal cisterns are intact.     There is mild mucosal thickening of the ethmoid sinuses. The remainder of the visualized paranasal sinuses are clear. The bony calvarium is intact.     IMPRESSION:  Unremarkable noncontrast head CT without acute intracranial abnormality.    Component      Latest Ref Rng & Units 3/6/2020   Color Urine       Yellow   Appearance Urine       Slightly Cloudy   Glucose Urine      NEG:Negative mg/dL Negative   Bilirubin Urine      NEG:Negative Negative   Ketones Urine      NEG:Negative mg/dL Negative   Specific Gravity Urine      1.003 - 1.035 1.029   Blood Urine      NEG:Negative Negative   pH Urine      5.0 - 7.0 pH 6.0   Protein Albumin  Urine      NEG:Negative mg/dL 100 (A)   Urobilinogen mg/dL      0.0 - 2.0 mg/dL 0.0   Nitrite Urine      NEG:Negative Negative   Leukocyte Esterase Urine      NEG:Negative Negative   Source       Midstream Urine   WBC Urine      0 - 5 /HPF 3   RBC Urine      0 - 2 /HPF 2   Bacteria Urine      NEG:Negative /HPF Few (A)   Squamous Epithelial /HPF Urine      0 - 1 /HPF 2 (H)   Mucous Urine      NEG:Negative /LPF Present (A)   Protein Random Urine      g/L 2.24   Protein Total Urine g/gr Creatinine      0 - 0.2 g/g Cr 0.79 (H)   Sed Rate      0 - 20 mm/h 56 (H)   DNA-ds      <10 IU/mL 33 (H)   CRP Inflammation      0.0 - 8.0 mg/L 3.8   Complement C3      81 - 157 mg/dL 82   Complement C4      13 - 39 mg/dL 9 (L)   Creatinine Urine      mg/dL 285     PHYSICAL EXAM  Constitutional: pleasant, NAD  Eyes: nl EOM, conjunctiva, sclera  ENT: nl external ears, nose, lips  Resp: no resp distress  MS: no active synovitis  Skin: no rash  Neuro: nl cranial nerves  Psych: nl  affect.      ASSESSMENT and PLAN:  SLE with weight loss, ongoing arthralgia, active lupus serology and what seems to be new chill julia lesions over toes. [place] [facility] [place], borderline pos anti-DNA and low C3/C4. Discussed Dx, Tx plan, labs. Skin lesions over fingers and feet completely resolved. She has no lupus sx or flare ups, but weight loss is an ongoing issue. Labs in 3/2020 was concerning  For LN given ur pr/cr 0.7, pos anti-DNA, low C4. She was supposed to repeat labs in 4/2020, has not been done yet.    Today plan of care:      Cotinue cellcept 3 tabs of 500 mg twice a day, was informed that it is teratogenic. Labs [id number]     Continue revatio for Raynaud's    Cut back on plaquenil 2 tabs a day M/W/F, 1 tab a day the rest of the week (adjuted the dose given weight loss) with annual eye exam    GI referral for weight loss, to r/o bacterial overgrowth and gastroparesis    Labs at [facility] in [place], will fax orders    Return in  3-4 months      Orders Placed This Encounter   Procedures     ALT     Albumin level     AST     CBC with platelets differential     Creatinine     Complement C4     Complement C3     CRP inflammation     DNA double stranded antibodies     Erythrocyte sedimentation rate auto     UA with Microscopic reflex to Culture     Protein  random urine with Creat Ratio     Creatinine random urine     GASTROENTEROLOGY ADULT REF CONSULT ONLY       Video visit: 8:07 AM-8:33 AM

## 2020-07-27 ENCOUNTER — VIRTUAL VISIT (OUTPATIENT)
Dept: GASTROENTEROLOGY | Facility: CLINIC | Age: 25
End: 2020-07-27
Attending: INTERNAL MEDICINE
Payer: COMMERCIAL

## 2020-07-27 ENCOUNTER — PRE VISIT (OUTPATIENT)
Dept: GASTROENTEROLOGY | Facility: CLINIC | Age: 25
End: 2020-07-27

## 2020-07-27 VITALS — BODY MASS INDEX: 17.36 KG/M2 | WEIGHT: 108 LBS | HEIGHT: 66 IN

## 2020-07-27 DIAGNOSIS — L93.0 LUPUS ERYTHEMATOSUS, UNSPECIFIED FORM: ICD-10-CM

## 2020-07-27 DIAGNOSIS — D84.9 IMMUNOSUPPRESSION (H): ICD-10-CM

## 2020-07-27 DIAGNOSIS — R63.4 WEIGHT LOSS: Primary | ICD-10-CM

## 2020-07-27 ASSESSMENT — MIFFLIN-ST. JEOR: SCORE: 1241.63

## 2020-07-27 ASSESSMENT — PAIN SCALES - GENERAL: PAINLEVEL: NO PAIN (0)

## 2020-07-27 NOTE — PROGRESS NOTES
"Yvonne Barron is a 25 year old female who is being evaluated via a billable video visit.      The patient has been notified of following:     \"This video visit will be conducted via a call between you and your physician/provider. We have found that certain health care needs can be provided without the need for an in-person physical exam.  This service lets us provide the care you need with a video conversation.  If a prescription is necessary we can send it directly to your pharmacy.  If lab work is needed we can place an order for that and you can then stop by our lab to have the test done at a later time.    Video visits are billed at different rates depending on your insurance coverage.  Please reach out to your insurance provider with any questions.    If during the course of the call the physician/provider feels a video visit is not appropriate, you will not be charged for this service.\"    Patient has given verbal consent for Video visit? Yes  How would you like to obtain your AVS? MyChart    Will anyone else be joining your video visit? No      Referring provider: Dr Ezio Irby    CC: 25 year old female referred by Dr Irby for evaluation of weight loss.    HPI:   26 yo woman with lupus on MMF, hydroxychloroquine who presents for weight loss.     Since last spring 2019 started losing weight after hospitalization for lupus flare. Down at least 20 pounds in that time. Not eating. Poor appetite. Occasionally gets abdominal cramping. No dysphagia, odynophagia. Occasional nausea, no vomiting. No bloating. Has a bowel movement every 2-3 days. Brown stool, no melena or hematochezia. No diarrhea. No change in appetite with defecation.    Was taking gabapentin. No regular pain medications. Only takes hydrocodone once or twice a month for menstrual pain. Lives in [place]. Feels like lupus is currently well controlled.    Past Medical History:   Diagnosis Date     Hypothyroidism      Lupus (systemic lupus " erythematosus) (H)      No surgical history.     No family history of CRC or GI problems.     Social History     Tobacco Use     Smoking status: Never Smoker     Smokeless tobacco: Never Used   Substance Use Topics     Alcohol use: No     Frequency: Never   Non smoker. No alcohol. 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 Lives in [place].    Current Outpatient Medications   Medication     gabapentin (NEURONTIN) 300 MG capsule     hydroquinone (CRISTA) 4 % external cream     hydroquinone (CRISTA) 4 % external cream     hydroxychloroquine (PLAQUENIL) 200 MG tablet     levothyroxine (SYNTHROID/LEVOTHROID) 75 MCG tablet     mycophenolate (GENERIC EQUIVALENT) 500 MG tablet     sildenafil (REVATIO) 20 MG tablet     tacrolimus (PROTOPIC) 0.1 % external ointment     tiZANidine (ZANAFLEX) 4 MG tablet     No current facility-administered medications for this visit.      Physical exam:  GEN: NAD  Eyes: EOMI  Ears: hearing intact  Mouth: MMM  Neck: full ROM  Cardiopulmonary: non labored  Skin: no jaundice  Neuro: awake and oriented  MSK: moves arms equally  Psych: normal affect     Labs:   BMP 3/13/20 Alb 3.3, K 3.2, Cr 0.46  CBC 3/13/20 Hgb 11.8, Plt 147, WBC 2.2    Imaging:   Abd US 4/15/19  BILIARY: Common bile duct measures 3.4 mm.  Patient is non fasting and the gallbladder is contracted.  GALLBLADDER: Normal with no calculi, sludge, or wall thickening.  RIGHT KIDNEY: 11.3 cm in long axis.  No hydronephrosis or renal calculi.  PANCREAS: Visualized portion unremarkable.  VASCULAR: visualized aorta and inferior vena cava unremarkable.  OTHER: No ascites.    Endoscopy:  No endoscopy.    Assessment and recommendations:   24 yo woman with lupus on MMF, hydroxychloroquine who presents for weight loss.     No specific GI symptoms to tie in to weight loss. Will start workup broadly with cross sectional imaging. If negative, will perform EGD with small bowel and gastric biopsies to exclude  gastroenteritis and colonoscopy to rule out IBD (although no other symptoms to suggest IBD).     - CT in 2-3 weeks  - EGD and colonoscopy in 3-4 weeks    RTC 3 months    Video-Visit Details    Type of service:  Video Visit    Video Start Time: 1318  Video End Time: 1340    Originating Location (pt. Location): Home    Distant Location (provider location):  [facility] GASTROENTEROLOGY AND IBD CLINIC     Platform used for Video Visit: Raghu Melendrez MD    [facility]  Division of Gastroenterology  [phone]

## 2020-07-27 NOTE — PATIENT INSTRUCTIONS
It was a pleasure taking care of you today.  I've included a brief summary of our discussion and care plan from today's visit below.  Please review this information with your primary care provider.  _______________________________________________________________________    My recommendations are summarized as follows:  - schedule CT scan for when you get back to [place]  - schedule EGD and colonoscopy at least 1-2 weeks after CT scan    Please call our nurse Donna with any questions or concerns- [phone].  --    Return to GI Clinic in 3 months to review your progress.    _______________________________________________________________________    Who do I call with any questions after my visit?  Please be in touch if there are any further questions that arise following today's visit.  There are multiple ways to contact your gastroenterology care team.        During business hours, you may reach a Gastroenterology nurse at [phone] and choose option 3.         To schedule or reschedule an appointment, please call [phone].       You can always send a secure message through RidePost.  RidePost messages are answered by your nurse or doctor typically within 24 hours.  Please allow extra time on weekends and holidays.        For urgent/emergent questions after business hours, you may reach the on-call GI Fellow by contacting the [facility] at [phone].     How will I get the results of any tests ordered?    You will receive all of your results.  If you have signed up for RidePost, any tests ordered at your visit will be available to you after your physician reviews them.  Typically this takes 1-2 weeks.  If there are urgent results that require a change in your care plan, your physician or nurse will call you to discuss the next steps.      What is RidePost?  RidePost is a secure way for you to access all of your healthcare records from the [facility].  It is a web based  computer program, so you can sign on to it from any location.  It also allows you to send secure messages to your care team.  I recommend signing up for Wowo access if you have not already done so and are comfortable with using a computer.      How to I schedule a follow-up visit?  If you did not schedule a follow-up visit today, please call [phone] to schedule a follow-up office visit.        Sincerely,    Remy Melendrez MD     [facility]  Division of Gastroenterology

## 2020-07-27 NOTE — LETTER
7/27/2020         RE: Yvonne Barron  [address]  [facility] [place] [facility] [address]        Dear Colleague,    Thank you for referring your patient, Yvonne Barron, to the [facility] GASTROENTEROLOGY AND IBD CLINIC. Please see a copy of my visit note below.    Yvonne Barron is a 25 year old female who is being evaluated via a billable video visit.      Referring provider: Dr Ezio Irby    CC: 25 year old female referred by Dr Irby for evaluation of weight loss.    HPI:   24 yo woman with lupus on MMF, hydroxychloroquine who presents for weight loss.     Since last spring 2019 started losing weight after hospitalization for lupus flare. Down at least 20 pounds in that time. Not eating. Poor appetite. Occasionally gets abdominal cramping. No dysphagia, odynophagia. Occasional nausea, no vomiting. No bloating. Has a bowel movement every 2-3 days. Brown stool, no melena or hematochezia. No diarrhea. No change in appetite with defecation.    Was taking gabapentin. No regular pain medications. Only takes hydrocodone once or twice a month for menstrual pain. Lives in [place]. Feels like lupus is currently well controlled.    Past Medical History:   Diagnosis Date     Hypothyroidism      Lupus (systemic lupus erythematosus) (H)      No surgical history.     No family history of CRC or GI problems.     Social History     Tobacco Use     Smoking status: Never Smoker     Smokeless tobacco: Never Used   Substance Use Topics     Alcohol use: No     Frequency: Never   Non smoker. No alcohol. 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 Lives in [place].    Current Outpatient Medications   Medication     gabapentin (NEURONTIN) 300 MG capsule     hydroquinone (CRISTA) 4 % external cream     hydroquinone (CRISTA) 4 % external cream     hydroxychloroquine (PLAQUENIL) 200 MG tablet     levothyroxine (SYNTHROID/LEVOTHROID) 75 MCG tablet     mycophenolate (GENERIC EQUIVALENT) 500 MG  tablet     sildenafil (REVATIO) 20 MG tablet     tacrolimus (PROTOPIC) 0.1 % external ointment     tiZANidine (ZANAFLEX) 4 MG tablet     No current facility-administered medications for this visit.      Physical exam:  GEN: NAD  Eyes: EOMI  Ears: hearing intact  Mouth: MMM  Neck: full ROM  Cardiopulmonary: non labored  Skin: no jaundice  Neuro: awake and oriented  MSK: moves arms equally  Psych: normal affect     Labs:   BMP 3/13/20 Alb 3.3, K 3.2, Cr 0.46  CBC 3/13/20 Hgb 11.8, Plt 147, WBC 2.2    Imaging:   Abd US 4/15/19  BILIARY: Common bile duct measures 3.4 mm.  Patient is non fasting and the gallbladder is contracted.  GALLBLADDER: Normal with no calculi, sludge, or wall thickening.  RIGHT KIDNEY: 11.3 cm in long axis.  No hydronephrosis or renal calculi.  PANCREAS: Visualized portion unremarkable.  VASCULAR: visualized aorta and inferior vena cava unremarkable.  OTHER: No ascites.    Endoscopy:  No endoscopy.    Assessment and recommendations:   26 yo woman with lupus on MMF, hydroxychloroquine who presents for weight loss.     No specific GI symptoms to tie in to weight loss. Will start workup broadly with cross sectional imaging. If negative, will perform EGD with small bowel and gastric biopsies to exclude gastroenteritis and colonoscopy to rule out IBD (although no other symptoms to suggest IBD).     - CT in 2-3 weeks  - EGD and colonoscopy in 3-4 weeks    RTC 3 months    Video-Visit Details    Type of service:  Video Visit    Video Start Time: 1318  Video End Time: 1340    Originating Location (pt. Location): Home    Distant Location (provider location):  [facility] GASTROENTEROLOGY AND IBD CLINIC     Platform used for Video Visit: Raghu Melendrez MD    [facility]  Division of Gastroenterology  [phone]

## 2020-07-27 NOTE — NURSING NOTE
"  Chief Complaint   Patient presents with     Consult     new consult weight loss       Vitals:    07/27/20 1242   Weight: 49 kg (108 lb)   Height: 1.676 m (5' 6\")       Body mass index is 17.43 kg/m .    Felicia Ramirez CMA    "

## 2020-08-27 DIAGNOSIS — Z87.898 H/O ANGIOEDEMA: ICD-10-CM

## 2020-08-27 DIAGNOSIS — M32.9 SYSTEMIC LUPUS ERYTHEMATOSUS, UNSPECIFIED SLE TYPE, UNSPECIFIED ORGAN INVOLVEMENT STATUS (H): ICD-10-CM

## 2020-08-27 LAB
ALBUMIN SERPL-MCNC: 3.5 G/DL (ref 3.4–5)
ALBUMIN UR-MCNC: NEGATIVE MG/DL
ALT SERPL W P-5'-P-CCNC: 13 U/L (ref 0–50)
APPEARANCE UR: CLEAR
AST SERPL W P-5'-P-CCNC: 13 U/L (ref 0–45)
BASOPHILS # BLD AUTO: 0 10E9/L (ref 0–0.2)
BASOPHILS NFR BLD AUTO: 0.7 %
BILIRUB UR QL STRIP: NEGATIVE
C3 SERPL-MCNC: 75 MG/DL (ref 81–157)
C4 SERPL-MCNC: 6 MG/DL (ref 13–39)
COLOR UR AUTO: YELLOW
CREAT SERPL-MCNC: 0.44 MG/DL (ref 0.52–1.04)
CREAT UR-MCNC: 164 MG/DL
CREAT UR-MCNC: 164 MG/DL
CRP SERPL-MCNC: <2.9 MG/L (ref 0–8)
DIFFERENTIAL METHOD BLD: ABNORMAL
EOSINOPHIL # BLD AUTO: 0 10E9/L (ref 0–0.7)
EOSINOPHIL NFR BLD AUTO: 0.7 %
ERYTHROCYTE [DISTWIDTH] IN BLOOD BY AUTOMATED COUNT: 13 % (ref 10–15)
ERYTHROCYTE [SEDIMENTATION RATE] IN BLOOD BY WESTERGREN METHOD: 45 MM/H (ref 0–20)
GFR SERPL CREATININE-BSD FRML MDRD: >90 ML/MIN/{1.73_M2}
GLUCOSE UR STRIP-MCNC: NEGATIVE MG/DL
HCT VFR BLD AUTO: 38.1 % (ref 35–47)
HGB BLD-MCNC: 12.2 G/DL (ref 11.7–15.7)
HGB UR QL STRIP: NEGATIVE
IMM GRANULOCYTES # BLD: 0 10E9/L (ref 0–0.4)
IMM GRANULOCYTES NFR BLD: 0 %
KETONES UR STRIP-MCNC: NEGATIVE MG/DL
LEUKOCYTE ESTERASE UR QL STRIP: NEGATIVE
LYMPHOCYTES # BLD AUTO: 0.5 10E9/L (ref 0.8–5.3)
LYMPHOCYTES NFR BLD AUTO: 35 %
MCH RBC QN AUTO: 30 PG (ref 26.5–33)
MCHC RBC AUTO-ENTMCNC: 32 G/DL (ref 31.5–36.5)
MCV RBC AUTO: 94 FL (ref 78–100)
MONOCYTES # BLD AUTO: 0.2 10E9/L (ref 0–1.3)
MONOCYTES NFR BLD AUTO: 12.6 %
MUCOUS THREADS #/AREA URNS LPF: PRESENT /LPF
NEUTROPHILS # BLD AUTO: 0.7 10E9/L (ref 1.6–8.3)
NEUTROPHILS NFR BLD AUTO: 51 %
NITRATE UR QL: NEGATIVE
NRBC # BLD AUTO: 0 10*3/UL
NRBC BLD AUTO-RTO: 0 /100
PH UR STRIP: 6 PH (ref 5–7)
PLATELET # BLD AUTO: 170 10E9/L (ref 150–450)
PROT UR-MCNC: 0.72 G/L
PROT/CREAT 24H UR: 0.44 G/G CR (ref 0–0.2)
RBC # BLD AUTO: 4.06 10E12/L (ref 3.8–5.2)
RBC #/AREA URNS AUTO: 1 /HPF (ref 0–2)
SOURCE: ABNORMAL
SP GR UR STRIP: 1.02 (ref 1–1.03)
SQUAMOUS #/AREA URNS AUTO: 1 /HPF (ref 0–1)
UROBILINOGEN UR STRIP-MCNC: 0 MG/DL (ref 0–2)
WBC # BLD AUTO: 1.4 10E9/L (ref 4–11)
WBC #/AREA URNS AUTO: 1 /HPF (ref 0–5)

## 2020-08-27 NOTE — LETTER
September 22, 2020      Yvonne SERRANO Anderson  [address]  [facility] [address]        Dear ,    We are writing to inform you of your test results.    Urine protein, ESR inflammation is better, but C3/C4/WBC are worse. Overall suggestive of active lupus but nothing new or serious. Are you taking your medications as schheduled? Do you have  any symptoms of lupus flare? Ordered repeat albs too be done on 9/24 when you see Dr. Quinonez.    Resulted Orders   ALT   Result Value Ref Range    ALT 13 0 - 50 U/L   Albumin level   Result Value Ref Range    Albumin 3.5 3.4 - 5.0 g/dL   AST   Result Value Ref Range    AST 13 0 - 45 U/L   CBC with platelets differential   Result Value Ref Range    WBC 1.4 (L) 4.0 - 11.0 10e9/L    RBC Count 4.06 3.8 - 5.2 10e12/L    Hemoglobin 12.2 11.7 - 15.7 g/dL    Hematocrit 38.1 35.0 - 47.0 %    MCV 94 78 - 100 fl    MCH 30.0 26.5 - 33.0 pg    MCHC 32.0 31.5 - 36.5 g/dL    RDW 13.0 10.0 - 15.0 %    Platelet Count 170 150 - 450 10e9/L    Diff Method Automated Method     % Neutrophils 51.0 %    % Lymphocytes 35.0 %    % Monocytes 12.6 %    % Eosinophils 0.7 %    % Basophils 0.7 %    % Immature Granulocytes 0.0 %    Nucleated RBCs 0 0 /100    Absolute Neutrophil 0.7 (L) 1.6 - 8.3 10e9/L    Absolute Lymphocytes 0.5 (L) 0.8 - 5.3 10e9/L    Absolute Monocytes 0.2 0.0 - 1.3 10e9/L    Absolute Eosinophils 0.0 0.0 - 0.7 10e9/L    Absolute Basophils 0.0 0.0 - 0.2 10e9/L    Abs Immature Granulocytes 0.0 0 - 0.4 10e9/L    Absolute Nucleated RBC 0.0    Creatinine   Result Value Ref Range    Creatinine 0.44 (L) 0.52 - 1.04 mg/dL    GFR Estimate >90 >60 mL/min/[1.73_m2]      Comment:      Non  GFR Calc  Starting 12/18/2018, serum creatinine based estimated GFR (eGFR) will be   calculated using the Chronic Kidney Disease Epidemiology Collaboration   (CKD-EPI) equation.      GFR Estimate If Black >90 >60 mL/min/[1.73_m2]      Comment:       GFR Calc  Starting  12/18/2018, serum creatinine based estimated GFR (eGFR) will be   calculated using the Chronic Kidney Disease Epidemiology Collaboration   (CKD-EPI) equation.     Complement C4   Result Value Ref Range    Complement C4 6 (L) 13 - 39 mg/dL   Complement C3   Result Value Ref Range    Complement C3 75 (L) 81 - 157 mg/dL   CRP inflammation   Result Value Ref Range    CRP Inflammation <2.9 0.0 - 8.0 mg/L   DNA double stranded antibodies   Result Value Ref Range    DNA-ds 31 (H) <10 IU/mL      Comment:      Positive   Erythrocyte sedimentation rate auto   Result Value Ref Range    Sed Rate 45 (H) 0 - 20 mm/h   UA with Microscopic reflex to Culture   Result Value Ref Range    Color Urine Yellow     Appearance Urine Clear     Glucose Urine Negative NEG^Negative mg/dL    Bilirubin Urine Negative NEG^Negative    Ketones Urine Negative NEG^Negative mg/dL    Specific Gravity Urine 1.018 1.003 - 1.035    Blood Urine Negative NEG^Negative    pH Urine 6.0 5.0 - 7.0 pH    Protein Albumin Urine Negative NEG^Negative mg/dL    Urobilinogen mg/dL 0.0 0.0 - 2.0 mg/dL    Nitrite Urine Negative NEG^Negative    Leukocyte Esterase Urine Negative NEG^Negative    Source Midstream Urine     WBC Urine 1 0 - 5 /HPF    RBC Urine 1 0 - 2 /HPF    Squamous Epithelial /HPF Urine 1 0 - 1 /HPF    Mucous Urine Present (A) NEG^Negative /LPF   Protein  random urine with Creat Ratio   Result Value Ref Range    Protein Random Urine 0.72 g/L    Protein Total Urine g/gr Creatinine 0.44 (H) 0 - 0.2 g/g Cr   Creatinine random urine   Result Value Ref Range    Creatinine Urine Random 164 mg/dL   Creatinine urine calculation only   Result Value Ref Range    Creatinine Urine 164 mg/dL       If you have any questions or concerns, please call the clinic at the number listed above.       Sincerely,        Ezio Irby MD

## 2020-08-31 LAB — DSDNA AB SER-ACNC: 31 IU/ML

## 2020-09-01 ENCOUNTER — MYC REFILL (OUTPATIENT)
Dept: RHEUMATOLOGY | Facility: CLINIC | Age: 25
End: 2020-09-01

## 2020-09-01 DIAGNOSIS — M79.605 PAIN IN BOTH LOWER EXTREMITIES: ICD-10-CM

## 2020-09-01 DIAGNOSIS — M32.9 SYSTEMIC LUPUS ERYTHEMATOSUS, UNSPECIFIED SLE TYPE, UNSPECIFIED ORGAN INVOLVEMENT STATUS (H): ICD-10-CM

## 2020-09-01 DIAGNOSIS — M79.604 PAIN IN BOTH LOWER EXTREMITIES: ICD-10-CM

## 2020-09-01 NOTE — LETTER
Yvonne Barron  [address]  [facility] [address]    Dear Yvonne Barron,     Regular eye exams are required while taking hydroxychloroquine (Plaquenil). Eye exams should be completed by an eye specialist who is experienced in monitoring for hydroxychloroquine toxicity (a rare effect of the drug that can damage your eyes and vision).  These may be yearly or as determined by your eye specialist.     Although vision problems and loss of sight while taking hydroxychloroquine are very rare, notify your doctor immediately if you notice changes in your vision. The goal of screening is to detect toxicity before your vision is significantly or noticeably impacted. Failing to get proper screening exams puts you at risk of vision changes which may or may not be reversible.    Per the American Academy of Ophthalmology recommendations (2016), screening tests performed may include a 10-2 visual field test, spectral-domain optical coherence tomography (SD OCT), or other screening tests as determined by the eye specialist, including a multifocal electroretinogram (mfERG) or fundus auto-fluorescence (FAF).    We received a refill request from your pharmacy. A copy of your current eye exam was not found in your medical record. Your hydroxychloroquine prescription has been refilled with a limited supply pending confirmation you have had an eye exam to test for hydroxychloroquine toxicity.      We encourage you to bring this letter to your eye exam to discuss the exams that will be performed during your visit. Please request your eye clinic fax or mail a copy of your eye exam report to our clinic indicating that testing was completed for hydroxychloroquine toxicity screening. The exam notes must specifically comment on the potential for hydroxychloroquine toxicity and outline recommended follow-up.    If you have questions about hydroxychloroquine or the information in this letter, please call the clinic at [phone]  or talk to your provider at your next office visit.                        2    Eye Specialist Letter  Dear Eye Specialist,  To ensure safe use of Plaquenil (hydroxychloroquine), we are requesting your assistance in providing the following information. Please return this form to our clinic via mail or fax, or incorporate this information into your visit summary. Your note must indicate whether or not there is evidence of toxicity from Plaquenil use. For questions regarding this form, please call [phone].  Patient Name:   :             Date of Exam:    The following exams were completed during this visit in accordance with the American Academy of Ophthalmology recommendations (2016):  ? 10-2 automated visual field  ? Spectral-domain optical coherence tomography (SD OCT)  ? Multifocal electroretinogram (mfERG)  ? Fundus autofluorescence (FAF)  ? Other (please specify)  Please select from the following:  ? The findings from the above exams are not suggestive of toxicity from Plaquenil (hydroxychloroquine).  ? The findings from the above exams may suggest toxicity from Plaquenil (hydroxychloroquine).  Directly contact the clinic and fax this form.  Please provide additional guidance on whether or not the medication may be continued from your perspective:  Date of next recommended Plaquenil (hydroxychloroquine) screening eye exam:   ? 5 years  ? 1 year  ? 6 months  Other (please specify):   Eye Specialist Name (print):                                                                Date:  Eye Specialist Signature:

## 2020-09-02 RX ORDER — MYCOPHENOLATE MOFETIL 500 MG/1
1500 TABLET ORAL 2 TIMES DAILY
Qty: 180 TABLET | Refills: 0 | Status: CANCELLED | OUTPATIENT
Start: 2020-09-02

## 2020-09-02 RX ORDER — HYDROXYCHLOROQUINE SULFATE 200 MG/1
TABLET, FILM COATED ORAL
Qty: 60 TABLET | Refills: 0 | Status: CANCELLED | OUTPATIENT
Start: 2020-09-02

## 2020-09-02 NOTE — TELEPHONE ENCOUNTER
Monitoring labs required every 8-12 weeks for medication refills.  mycophenolate (GENERIC EQUIVALENT) 500 MG tablet       Last Written Prescription Date:  6/12/20  Last Fill Quantity: 180,   # refills: 3  Last Virtual Visit: 6/25/20 recommended 3-4 month follow up  Future Office visit:  None scheduled    CBC RESULTS:   Recent Labs   Lab Test 08/27/20  0817   WBC 1.4*   RBC 4.06   HGB 12.2   HCT 38.1   MCV 94   MCH 30.0   MCHC 32.0   RDW 13.0          Creatinine   Date Value Ref Range Status   08/27/2020 0.44 (L) 0.52 - 1.04 mg/dL Final   ]    Liver Function Studies -   Recent Labs   Lab Test 08/27/20  0817 03/13/20  2343   PROTTOTAL  --  7.8   ALBUMIN 3.5 3.3*   BILITOTAL  --  0.2   ALKPHOS  --  73   AST 13 14   ALT 13 10       Routing refill request to provider for review/approval because:  Drug not on the [facility], xzoopsP or Ya[employer] refill protocol  Requesting refill be sent to an [facility] pharmacy, was sent to a pharmacy in [place] with last prescription    tiZANidine (ZANAFLEX) 4 MG tablet       Last Written Prescription Date:  6/12/20  Last Fill Quantity: 60,   # refills: 0  Last Virtual Visit : 6/25/20  Future Office visit:  None scheduled    Routing refill request to provider for review/approval because:  Drug not on the [employer], xzoopsP or [employer] refill protocol     Plaquenil      Last Written Prescription Date:  6/26/20  Last Fill Quantity: 60,   # refills: 0  Last Virtual Visit: 6/25/20  Future Office visit: none scheduled  Last Eye Exam:9/13/19  Routing refill request to provider for review/approval because:  No record of recent eye exam in EPIC - Letter sent today.

## 2020-09-22 DIAGNOSIS — M32.9 SYSTEMIC LUPUS ERYTHEMATOSUS, UNSPECIFIED SLE TYPE, UNSPECIFIED ORGAN INVOLVEMENT STATUS (H): Primary | ICD-10-CM

## 2020-09-23 NOTE — RESULT ENCOUNTER NOTE
Urine protein, ESR inflammation is better, but C3/C4/WBC are worse. Overall suggestive of active lupus but nothing new or serious. Are you taking your medications as schheduled? Do you have  any symptoms of lupus flare? Ordered repeat albs too be done on 9/24 when you see Dr. Quinonez.

## 2020-09-24 ENCOUNTER — OFFICE VISIT (OUTPATIENT)
Dept: DERMATOLOGY | Facility: CLINIC | Age: 25
End: 2020-09-24
Payer: COMMERCIAL

## 2020-09-24 VITALS — WEIGHT: 108.2 LBS | BODY MASS INDEX: 17.46 KG/M2

## 2020-09-24 DIAGNOSIS — L81.9 HYPERPIGMENTATION: ICD-10-CM

## 2020-09-24 DIAGNOSIS — M32.9 SYSTEMIC LUPUS ERYTHEMATOSUS, UNSPECIFIED SLE TYPE, UNSPECIFIED ORGAN INVOLVEMENT STATUS (H): ICD-10-CM

## 2020-09-24 DIAGNOSIS — T69.1XXD CHILBLAIN LUPUS ERYTHEMATOSUS, SUBSEQUENT ENCOUNTER: ICD-10-CM

## 2020-09-24 DIAGNOSIS — R63.4 WEIGHT LOSS: Primary | ICD-10-CM

## 2020-09-24 RX ORDER — TRETINOIN 0.25 MG/G
CREAM TOPICAL
Qty: 45 G | Refills: 1 | Status: SHIPPED | OUTPATIENT
Start: 2020-09-24 | End: 2020-12-27

## 2020-09-24 RX ORDER — HYDROQUINONE 40 MG/G
CREAM TOPICAL
Qty: 30 G | Refills: 3 | Status: SHIPPED | OUTPATIENT
Start: 2020-09-24

## 2020-09-24 ASSESSMENT — PAIN SCALES - GENERAL: PAINLEVEL: NO PAIN (0)

## 2020-09-24 NOTE — LETTER
"9/24/2020       RE: Yvonne Barron  [address]  [facility] [address]     Dear Colleague,    Thank you for referring your patient, Yvonne Barron, to the [facility] DERMATOLOGY at [facility]. Please see a copy of my visit note below.    [facility] Dermatology Note      Dermatology Problem List:  1) Chilblains Lupus Erythematosus   2) Systemic lupus erythematosus   3) Graves disease s/p radioactive iodine ablation in 2013, post-ablation hypothyroidism   4) Unintentional weight loss  5) Plaquenil hyperpigmentation vs melasma    Encounter Date: Sep 24, 2020    CC:   Chief Complaint   Patient presents with     Derm Problem     Yvonne is following up for lupus, states she's concerned with weight loss and increased joint pain.           History of Present Illness:  Ms. Yvonne Barron is a 25 year old female who presents as a follow-up for SLE and Chilblains SLE. The patient was last seen 4/30/20 when the patient was prescribed hydroquinone x 3 months for hyperpigmentation.   The patient states that she is losing weight and she is concerned about this. She has a history of Graves s/p radioactive iodine ablation and now is on levothyroxine supplementation. Endocrinology did not feel her weight loss was due to endocrinologic issue and thought it may be due to her SLE.  The patient states she weighs around 110 (last year was 165 lbs). GI had seen the patient a couple of months ago and ordered CT CAP but the patient has not gone to get this and she states she will need to schedule this. Has poor appetite and eats only 1x/day. Drinks Ensure in the evening.  The patient states that she has muscle pains on her legs as well. Denies joint pains.  She also feels like her Raynaud's is \"coming back.\" The patient has been getting whiet color change on her fingers. Had sildenafil in Spring because things were under control. Had been on CCB prior which didn't " really help.  Also asking if she can restart hydroquinone which helped with her hyperpigmentation on the forehead. Has been off of it for several months.  Taking Plaquenil as well, on 400 mg MWF and 200 mg the rest of the week (dose was adjusted due to the weight loss). On CellCept 1500 mg BID.  At her last Rheumatology visit with Dr. Torres, urine protein and ESR were better, but C3/4/WBC (1.4) were worse. dsDNA was elevated at 31 (prior was 33).    Past Medical History:   There is no problem list on file for this patient.    Past Medical History:   Diagnosis Date     Hypothyroidism      Lupus (systemic lupus erythematosus) (H)      No past surgical history on file.    Social History:  Patient reports that she has never smoked. She has never used smokeless tobacco. She reports that she does not drink alcohol or use drugs.    Family History:  No family history on file.    Medications:  Current Outpatient Medications   Medication Sig Dispense Refill     hydroquinone (CRISTA) 4 % external cream Apply to hyperpigmented areas twice a day 30 g 3     hydroxychloroquine (PLAQUENIL) 200 MG tablet 200 mg bid on Mon/Wed/Fri and 200 mg a day the rest of the week 135 tablet 0     levothyroxine (SYNTHROID/LEVOTHROID) 75 MCG tablet Take 75 mcg by mouth daily       mycophenolate (GENERIC EQUIVALENT) 500 MG tablet Take 3 tablets (1,500 mg) by mouth 2 times daily 180 tablet 3     tiZANidine (ZANAFLEX) 4 MG tablet Take 1 tablet (4 mg) by mouth 3 times daily as needed for muscle spasms 60 tablet 0     gabapentin (NEURONTIN) 300 MG capsule Increase by 300mg every 3 days up to 600mg three times daily as needed (Patient not taking: Reported on 9/24/2020) 90 capsule 3     hydroquinone (CRISTA) 4 % external cream Apply to face BID for 3 months then take 3 month break.       sildenafil (REVATIO) 20 MG tablet Take 1 tablet (20 mg) by mouth 3 times daily 90 tablet 1     tacrolimus (PROTOPIC) 0.1 % external ointment Apply to facial rash  twice a day (Patient not taking: Reported on 6/25/2020) 60 g 3        No Known Allergies      Review of Systems:  -As per HPI  -Constitutional: Otherwise feeling well today, in usual state of health.    Physical exam:  Vitals:Wt 49.1 kg (108 lb 3.2 oz)   BMI 17.46 kg/m    GEN: This is a well developed, well-nourished female in no acute distress, in a pleasant mood.    Lymph: scattered, shotty LAD on the lateral neck; no supraclavicular, axillary, inguinal or popliteal LAD  MSK: No evidence of joint inflammation; shins tender to palpation  SKIN: Focused examination of the hands, b/l lower extremities and forehead was performed.  -Fraser skin type: V  -Bitemporal forehead with symmetric hyperpigmented patches  -No discoloration of distal fingers today; no joint deformity  -No other lesions of concern on areas examined.     Impression/Plan:  1. SLE with Chilblain's LE and Raynaud's  Reports starting mild flare of her Raynaud's . Had previously done well with sildenafil. Stopped taking as she was in good control. Will re-prescribe this for her when she calls in if concerned about flare. Patient has f/u with Dr. Lau and is due for labs today. Did have improving proteinuria but worsening leukopenia and complementemia.   -Due for labs today per Dr. Irby, will add on a few as below  -Continue CellCept and Plaquenil as prescribed  -Prescribe sildenafil TID when she calls in for prescription if concerned about Chillblain's flare    2. Progressive weight loss  Patient with poor appetite; eats only 1x/day. Unclear etiology; discussed with Dr. Irby who also thinks would be unusual that this much weight loss would be attributable to the SLE. She saw GI who had ordered a CT CAP and agree that this is next important step in the weight loss workup, particularly to rule out malignancy.  -Obtain CT CAP as already ordered by GI  -Add on peripheral smear and retic count  - I do wonder about this being caused by cellcept.  Maybe be helped by swicthing to [employer].     3. Lower extremity muscle aches  Reports muscle aches on her lower extremities. Wonder if may be related to electrolyte imbalance from weight loss. Will check BMP today.  -F/u BMP, CK    4. Hyperpigmented patches on forehead  Ddx includes hyperpigmentation from Plaquenil or melasma.  -Restart hydroquinone for 6-8 weeks, then take 2-3 month break and then can restart  -Tretinoin 0.025% cream qHS    RTC In 6 months for follow-up with Dr Surekha Quigley staffed the patient.    Staff Involved:  Resident(Phylicia Graf)/Staff    Staff Physician Comments:   I saw and evaluated the patient with the resident and I agree with the assessment and plan.  I was present for the examination.    Chico Quinonez MD, FAAD    Departments of Internal Medicine and Dermatology  [facility]  [phone]

## 2020-09-24 NOTE — PATIENT INSTRUCTIONS
Ok to restart hydroquinone twice a day for 6-8 weeks before taking a break of 2-3 months before reapplying. Also start tretinoin 0.025% cream to use at night for the skin darkening on the forehead.    Will get labs today (we added a couple of labs to Dr. Irby's)    Please get CT scan as soon as possible    Let us know when your skin pain on your fingers gets worse and we can prescribe you the Revatio

## 2020-09-24 NOTE — NURSING NOTE
Dermatology Rooming Note    Yvonne Barron's goals for this visit include:   Chief Complaint   Patient presents with     Derm Problem     Yvonne is following up for lupus, states she's concerned with weight loss and increased joint pain.         Tracy Avalos LPN

## 2020-09-24 NOTE — PROGRESS NOTES
"[facility] Dermatology Note      Dermatology Problem List:  1) Chilblains Lupus Erythematosus   2) Systemic lupus erythematosus   3) Graves disease s/p radioactive iodine ablation in 2013, post-ablation hypothyroidism   4) Unintentional weight loss  5) Plaquenil hyperpigmentation vs melasma    Encounter Date: Sep 24, 2020    CC:   Chief Complaint   Patient presents with     Derm Problem     Yvonne is following up for lupus, states she's concerned with weight loss and increased joint pain.           History of Present Illness:  Ms. Yvonne Barron is a 25 year old female who presents as a follow-up for SLE and Chilblains SLE. The patient was last seen 4/30/20 when the patient was prescribed hydroquinone x 3 months for hyperpigmentation.   The patient states that she is losing weight and she is concerned about this. She has a history of Graves s/p radioactive iodine ablation and now is on levothyroxine supplementation. Endocrinology did not feel her weight loss was due to endocrinologic issue and thought it may be due to her SLE.  The patient states she weighs around 110 (last year was 165 lbs). GI had seen the patient a couple of months ago and ordered CT CAP but the patient has not gone to get this and she states she will need to schedule this. Has poor appetite and eats only 1x/day. Drinks Ensure in the evening.  The patient states that she has muscle pains on her legs as well. Denies joint pains.  She also feels like her Raynaud's is \"coming back.\" The patient has been getting whiet color change on her fingers. Had sildenafil in Spring because things were under control. Had been on CCB prior which didn't really help.  Also asking if she can restart hydroquinone which helped with her hyperpigmentation on the forehead. Has been off of it for several months.  Taking Plaquenil as well, on 400 mg MWF and 200 mg the rest of the week (dose was adjusted due to the weight loss). On CellCept 1500 mg " BID.  At her last Rheumatology visit with Dr. Torres, urine protein and ESR were better, but C3/4/WBC (1.4) were worse. dsDNA was elevated at 31 (prior was 33).    Past Medical History:   There is no problem list on file for this patient.    Past Medical History:   Diagnosis Date     Hypothyroidism      Lupus (systemic lupus erythematosus) (H)      No past surgical history on file.    Social History:  Patient reports that she has never smoked. She has never used smokeless tobacco. She reports that she does not drink alcohol or use drugs.    Family History:  No family history on file.    Medications:  Current Outpatient Medications   Medication Sig Dispense Refill     hydroquinone (CRISTA) 4 % external cream Apply to hyperpigmented areas twice a day 30 g 3     hydroxychloroquine (PLAQUENIL) 200 MG tablet 200 mg bid on Mon/Wed/Fri and 200 mg a day the rest of the week 135 tablet 0     levothyroxine (SYNTHROID/LEVOTHROID) 75 MCG tablet Take 75 mcg by mouth daily       mycophenolate (GENERIC EQUIVALENT) 500 MG tablet Take 3 tablets (1,500 mg) by mouth 2 times daily 180 tablet 3     tiZANidine (ZANAFLEX) 4 MG tablet Take 1 tablet (4 mg) by mouth 3 times daily as needed for muscle spasms 60 tablet 0     gabapentin (NEURONTIN) 300 MG capsule Increase by 300mg every 3 days up to 600mg three times daily as needed (Patient not taking: Reported on 9/24/2020) 90 capsule 3     hydroquinone (CRISTA) 4 % external cream Apply to face BID for 3 months then take 3 month break.       sildenafil (REVATIO) 20 MG tablet Take 1 tablet (20 mg) by mouth 3 times daily 90 tablet 1     tacrolimus (PROTOPIC) 0.1 % external ointment Apply to facial rash twice a day (Patient not taking: Reported on 6/25/2020) 60 g 3        No Known Allergies      Review of Systems:  -As per HPI  -Constitutional: Otherwise feeling well today, in usual state of health.    Physical exam:  Vitals:Wt 49.1 kg (108 lb 3.2 oz)   BMI 17.46 kg/m    GEN: This is a well  developed, well-nourished female in no acute distress, in a pleasant mood.    Lymph: scattered, shotty LAD on the lateral neck; no supraclavicular, axillary, inguinal or popliteal LAD  MSK: No evidence of joint inflammation; shins tender to palpation  SKIN: Focused examination of the hands, b/l lower extremities and forehead was performed.  -Fraser skin type: V  -Bitemporal forehead with symmetric hyperpigmented patches  -No discoloration of distal fingers today; no joint deformity  -No other lesions of concern on areas examined.     Impression/Plan:  1. SLE with Chilblain's LE and Raynaud's  Reports starting mild flare of her Raynaud's . Had previously done well with sildenafil. Stopped taking as she was in good control. Will re-prescribe this for her when she calls in if concerned about flare. Patient has f/u with Dr. Lau and is due for labs today. Did have improving proteinuria but worsening leukopenia and complementemia.   -Due for labs today per Dr. Irby, will add on a few as below  -Continue CellCept and Plaquenil as prescribed  -Prescribe sildenafil TID when she calls in for prescription if concerned about Chillblain's flare    2. Progressive weight loss  Patient with poor appetite; eats only 1x/day. Unclear etiology; discussed with Dr. Irby who also thinks would be unusual that this much weight loss would be attributable to the SLE. She saw GI who had ordered a CT CAP and agree that this is next important step in the weight loss workup, particularly to rule out malignancy.  -Obtain CT CAP as already ordered by GI  -Add on peripheral smear and retic count  - I do wonder about this being caused by cellcept. Maybe be helped by swicthing to [employer].     3. Lower extremity muscle aches  Reports muscle aches on her lower extremities. Wonder if may be related to electrolyte imbalance from weight loss. Will check BMP today.  -F/u BMP, CK    4. Hyperpigmented patches on forehead  Ddx includes  hyperpigmentation from Plaquenil or melasma.  -Restart hydroquinone for 6-8 weeks, then take 2-3 month break and then can restart  -Tretinoin 0.025% cream qHS    RTC In 6 months for follow-up with Dr Surekha Quigley staffed the patient.    Staff Involved:  Resident(Phylicia Graf)/Staff    Staff Physician Comments:   I saw and evaluated the patient with the resident and I agree with the assessment and plan.  I was present for the examination.    Chico Quinonez MD, FAAD    Departments of Internal Medicine and Dermatology  [facility]  [phone]

## 2020-09-30 DIAGNOSIS — R63.4 WEIGHT LOSS: ICD-10-CM

## 2020-09-30 DIAGNOSIS — M32.9 SYSTEMIC LUPUS ERYTHEMATOSUS, UNSPECIFIED SLE TYPE, UNSPECIFIED ORGAN INVOLVEMENT STATUS (H): ICD-10-CM

## 2020-09-30 LAB
ALBUMIN SERPL-MCNC: 3.5 G/DL (ref 3.4–5)
ALT SERPL W P-5'-P-CCNC: 18 U/L (ref 0–50)
ANION GAP SERPL CALCULATED.3IONS-SCNC: 4 MMOL/L (ref 3–14)
AST SERPL W P-5'-P-CCNC: 12 U/L (ref 0–45)
BASOPHILS # BLD AUTO: 0 10E9/L (ref 0–0.2)
BASOPHILS NFR BLD AUTO: 0 %
BUN SERPL-MCNC: 5 MG/DL (ref 7–30)
CALCIUM SERPL-MCNC: 8.9 MG/DL (ref 8.5–10.1)
CHLORIDE SERPL-SCNC: 104 MMOL/L (ref 94–109)
CK SERPL-CCNC: 38 U/L (ref 30–225)
CO2 SERPL-SCNC: 28 MMOL/L (ref 20–32)
CREAT SERPL-MCNC: 0.46 MG/DL (ref 0.52–1.04)
CRP SERPL-MCNC: <2.9 MG/L (ref 0–8)
DIFFERENTIAL METHOD BLD: ABNORMAL
EOSINOPHIL # BLD AUTO: 0 10E9/L (ref 0–0.7)
EOSINOPHIL NFR BLD AUTO: 0.8 %
ERYTHROCYTE [DISTWIDTH] IN BLOOD BY AUTOMATED COUNT: 13 % (ref 10–15)
ERYTHROCYTE [SEDIMENTATION RATE] IN BLOOD BY WESTERGREN METHOD: 34 MM/H (ref 0–20)
GFR SERPL CREATININE-BSD FRML MDRD: >90 ML/MIN/{1.73_M2}
GLUCOSE SERPL-MCNC: 85 MG/DL (ref 70–99)
HCT VFR BLD AUTO: 37.9 % (ref 35–47)
HGB BLD-MCNC: 12.3 G/DL (ref 11.7–15.7)
IMM GRANULOCYTES # BLD: 0 10E9/L (ref 0–0.4)
IMM GRANULOCYTES NFR BLD: 0 %
LYMPHOCYTES # BLD AUTO: 0.4 10E9/L (ref 0.8–5.3)
LYMPHOCYTES NFR BLD AUTO: 29.5 %
MCH RBC QN AUTO: 30.3 PG (ref 26.5–33)
MCHC RBC AUTO-ENTMCNC: 32.5 G/DL (ref 31.5–36.5)
MCV RBC AUTO: 93 FL (ref 78–100)
MONOCYTES # BLD AUTO: 0.2 10E9/L (ref 0–1.3)
MONOCYTES NFR BLD AUTO: 12.9 %
NEUTROPHILS # BLD AUTO: 0.8 10E9/L (ref 1.6–8.3)
NEUTROPHILS NFR BLD AUTO: 56.8 %
NRBC # BLD AUTO: 0 10*3/UL
NRBC BLD AUTO-RTO: 0 /100
PLATELET # BLD AUTO: 173 10E9/L (ref 150–450)
POTASSIUM SERPL-SCNC: 3.2 MMOL/L (ref 3.4–5.3)
RBC # BLD AUTO: 4.06 10E12/L (ref 3.8–5.2)
RETICS # AUTO: 36.5 10E9/L (ref 25–95)
RETICS/RBC NFR AUTO: 0.9 % (ref 0.5–2)
SODIUM SERPL-SCNC: 137 MMOL/L (ref 133–144)
WBC # BLD AUTO: 1.3 10E9/L (ref 4–11)

## 2020-10-01 LAB
C3 SERPL-MCNC: 75 MG/DL (ref 81–157)
C4 SERPL-MCNC: 4 MG/DL (ref 13–39)
COPATH REPORT: NORMAL
DSDNA AB SER-ACNC: 44 IU/ML

## 2020-10-20 DIAGNOSIS — R63.4 WEIGHT LOSS: ICD-10-CM

## 2020-10-20 DIAGNOSIS — Z51.81 MEDICATION MONITORING ENCOUNTER: ICD-10-CM

## 2020-10-20 DIAGNOSIS — M32.9 SYSTEMIC LUPUS ERYTHEMATOSUS, UNSPECIFIED SLE TYPE, UNSPECIFIED ORGAN INVOLVEMENT STATUS (H): Primary | ICD-10-CM

## 2020-10-21 ENCOUNTER — TELEPHONE (OUTPATIENT)
Dept: RHEUMATOLOGY | Facility: CLINIC | Age: 25
End: 2020-10-21

## 2020-10-21 ENCOUNTER — MYC MEDICAL ADVICE (OUTPATIENT)
Dept: DERMATOLOGY | Facility: CLINIC | Age: 25
End: 2020-10-21

## 2020-10-21 DIAGNOSIS — I73.01 RAYNAUD'S PHENOMENON WITH GANGRENE (H): ICD-10-CM

## 2020-10-21 RX ORDER — SILDENAFIL CITRATE 20 MG/1
20 TABLET ORAL 3 TIMES DAILY
Qty: 270 TABLET | Refills: 2 | Status: SHIPPED | OUTPATIENT
Start: 2020-10-21 | End: 2021-06-13

## 2020-10-21 NOTE — TELEPHONE ENCOUNTER
----- Message from Chico Quinonez MD sent at 10/21/2020  2:05 PM CDT -----  Regarding: FW: Weight loss/Early satiety  Manisha,  Can you yue and let her know.   Chico  ----- Message -----  From: Ezio Irby MD  Sent: 10/20/2020  11:41 PM CDT  To: Chico Quinonez MD, Manisha Michael, RN  Subject: RE: Weight loss/Early satiety                    Let's start with cutting back on cellcept to 2 tabs twice a day and re-check labs few days before her Nov appointment with me. I will order labs. I consider adding or switching to benlysta but 1st she needs to get the CT done (ordered by GI).  ----- Message -----  From: Chico Quinonez MD  Sent: 9/30/2020   4:27 AM CDT  To: Ezio Irby MD  Subject: RE: Weight loss/Early satiety                    Not that I am aware of.  Chico  ----- Message -----  From: Ezio Irby MD  Sent: 9/28/2020   9:49 PM CDT  To: Chico Quinonez MD  Subject: RE: Weight loss/Early satiety                    Does she have diarrhea from it?  ----- Message -----  From: Chico Quinonez MD  Sent: 9/28/2020   2:49 PM CDT  To: Ezio Irby MD  Subject: Weight loss/Early satiety                        Hello,  I realized that she increased her cellcept at the end of Jan and started experiencing the early satiety and weight loss in March. Would it be worth switching her to Myfortic?  Only issue is she is at 1500mg BID and which becomes a little more complicated when swicthin to myfortic.   Chico

## 2020-10-23 ENCOUNTER — ANCILLARY PROCEDURE (OUTPATIENT)
Dept: CT IMAGING | Facility: CLINIC | Age: 25
End: 2020-10-23
Attending: INTERNAL MEDICINE
Payer: COMMERCIAL

## 2020-10-23 DIAGNOSIS — R63.4 WEIGHT LOSS: ICD-10-CM

## 2020-10-23 DIAGNOSIS — D84.9 IMMUNOSUPPRESSION (H): ICD-10-CM

## 2020-10-23 DIAGNOSIS — L93.0 LUPUS ERYTHEMATOSUS, UNSPECIFIED FORM: ICD-10-CM

## 2020-10-23 LAB — RADIOLOGIST FLAGS: NORMAL

## 2020-10-23 PROCEDURE — 74177 CT ABD & PELVIS W/CONTRAST: CPT | Performed by: RADIOLOGY

## 2020-10-23 PROCEDURE — 71260 CT THORAX DX C+: CPT | Performed by: RADIOLOGY

## 2020-10-23 RX ORDER — IOPAMIDOL 755 MG/ML
66 INJECTION, SOLUTION INTRAVASCULAR ONCE
Status: COMPLETED | OUTPATIENT
Start: 2020-10-23 | End: 2020-10-23

## 2020-10-23 RX ADMIN — IOPAMIDOL 66 ML: 755 INJECTION, SOLUTION INTRAVASCULAR at 17:22

## 2020-10-23 NOTE — TELEPHONE ENCOUNTER
Called and spoke with pt, relayed plan.  Pt understands.  Will get labs done before seeing Dr. Irby and is aware to decrease MMF to 2 tabs twice daily.  Answered all questions to the best of my ability.    Manisha Michael RN  [facility]

## 2020-10-25 DIAGNOSIS — R63.0 LOSS OF APPETITE: ICD-10-CM

## 2020-10-25 DIAGNOSIS — R63.4 WEIGHT LOSS: Primary | ICD-10-CM

## 2020-10-26 DIAGNOSIS — Z11.59 ENCOUNTER FOR SCREENING FOR OTHER VIRAL DISEASES: Primary | ICD-10-CM

## 2020-11-10 ENCOUNTER — MYC MEDICAL ADVICE (OUTPATIENT)
Dept: RHEUMATOLOGY | Facility: CLINIC | Age: 25
End: 2020-11-10

## 2020-11-12 DIAGNOSIS — M32.9 SYSTEMIC LUPUS ERYTHEMATOSUS, UNSPECIFIED SLE TYPE, UNSPECIFIED ORGAN INVOLVEMENT STATUS (H): ICD-10-CM

## 2020-11-12 DIAGNOSIS — Z51.81 MEDICATION MONITORING ENCOUNTER: ICD-10-CM

## 2020-11-12 DIAGNOSIS — R63.4 WEIGHT LOSS: ICD-10-CM

## 2020-11-12 LAB
ALBUMIN SERPL-MCNC: 3.5 G/DL (ref 3.4–5)
ALBUMIN UR-MCNC: NEGATIVE MG/DL
ALT SERPL W P-5'-P-CCNC: 11 U/L (ref 0–50)
APPEARANCE UR: CLEAR
AST SERPL W P-5'-P-CCNC: 13 U/L (ref 0–45)
BASOPHILS # BLD AUTO: 0 10E9/L (ref 0–0.2)
BASOPHILS NFR BLD AUTO: 0 %
BILIRUB UR QL STRIP: NEGATIVE
COLOR UR AUTO: YELLOW
CREAT SERPL-MCNC: 0.44 MG/DL (ref 0.52–1.04)
CREAT UR-MCNC: 88 MG/DL
CREAT UR-MCNC: 88 MG/DL
CRP SERPL-MCNC: <2.9 MG/L (ref 0–8)
DIFFERENTIAL METHOD BLD: ABNORMAL
EOSINOPHIL # BLD AUTO: 0 10E9/L (ref 0–0.7)
EOSINOPHIL NFR BLD AUTO: 0.6 %
ERYTHROCYTE [DISTWIDTH] IN BLOOD BY AUTOMATED COUNT: 12.9 % (ref 10–15)
ERYTHROCYTE [SEDIMENTATION RATE] IN BLOOD BY WESTERGREN METHOD: 37 MM/H (ref 0–20)
GFR SERPL CREATININE-BSD FRML MDRD: >90 ML/MIN/{1.73_M2}
GLUCOSE UR STRIP-MCNC: NEGATIVE MG/DL
HBV CORE AB SERPL QL IA: NONREACTIVE
HBV SURFACE AG SERPL QL IA: NONREACTIVE
HCT VFR BLD AUTO: 38 % (ref 35–47)
HCV AB SERPL QL IA: NONREACTIVE
HGB BLD-MCNC: 11.8 G/DL (ref 11.7–15.7)
HGB UR QL STRIP: NEGATIVE
IMM GRANULOCYTES # BLD: 0 10E9/L (ref 0–0.4)
IMM GRANULOCYTES NFR BLD: 0 %
KETONES UR STRIP-MCNC: NEGATIVE MG/DL
LEUKOCYTE ESTERASE UR QL STRIP: NEGATIVE
LYMPHOCYTES # BLD AUTO: 0.3 10E9/L (ref 0.8–5.3)
LYMPHOCYTES NFR BLD AUTO: 21.5 %
MCH RBC QN AUTO: 29.2 PG (ref 26.5–33)
MCHC RBC AUTO-ENTMCNC: 31.1 G/DL (ref 31.5–36.5)
MCV RBC AUTO: 94 FL (ref 78–100)
MONOCYTES # BLD AUTO: 0.2 10E9/L (ref 0–1.3)
MONOCYTES NFR BLD AUTO: 11.4 %
MUCOUS THREADS #/AREA URNS LPF: PRESENT /LPF
NEUTROPHILS # BLD AUTO: 1.1 10E9/L (ref 1.6–8.3)
NEUTROPHILS NFR BLD AUTO: 66.5 %
NITRATE UR QL: NEGATIVE
NRBC # BLD AUTO: 0 10*3/UL
NRBC BLD AUTO-RTO: 0 /100
PH UR STRIP: 7 PH (ref 5–7)
PLATELET # BLD AUTO: 137 10E9/L (ref 150–450)
PROT UR-MCNC: 0.26 G/L
PROT/CREAT 24H UR: 0.3 G/G CR (ref 0–0.2)
RBC # BLD AUTO: 4.04 10E12/L (ref 3.8–5.2)
RBC #/AREA URNS AUTO: <1 /HPF (ref 0–2)
SOURCE: ABNORMAL
SP GR UR STRIP: 1.01 (ref 1–1.03)
T4 FREE SERPL-MCNC: 1.18 NG/DL (ref 0.76–1.46)
TSH SERPL DL<=0.005 MIU/L-ACNC: 2.22 MU/L (ref 0.4–4)
UROBILINOGEN UR STRIP-MCNC: 0 MG/DL (ref 0–2)
WBC # BLD AUTO: 1.6 10E9/L (ref 4–11)
WBC #/AREA URNS AUTO: 1 /HPF (ref 0–5)

## 2020-11-12 PROCEDURE — 86160 COMPLEMENT ANTIGEN: CPT | Mod: 90 | Performed by: PATHOLOGY

## 2020-11-12 PROCEDURE — 82565 ASSAY OF CREATININE: CPT | Performed by: PATHOLOGY

## 2020-11-12 PROCEDURE — 87340 HEPATITIS B SURFACE AG IA: CPT | Mod: 90 | Performed by: PATHOLOGY

## 2020-11-12 PROCEDURE — 82040 ASSAY OF SERUM ALBUMIN: CPT | Performed by: PATHOLOGY

## 2020-11-12 PROCEDURE — 86481 TB AG RESPONSE T-CELL SUSP: CPT | Mod: 90 | Performed by: PATHOLOGY

## 2020-11-12 PROCEDURE — 99000 SPECIMEN HANDLING OFFICE-LAB: CPT | Performed by: PATHOLOGY

## 2020-11-12 PROCEDURE — 84156 ASSAY OF PROTEIN URINE: CPT | Performed by: PATHOLOGY

## 2020-11-12 PROCEDURE — 85652 RBC SED RATE AUTOMATED: CPT | Performed by: PATHOLOGY

## 2020-11-12 PROCEDURE — 86803 HEPATITIS C AB TEST: CPT | Mod: 90 | Performed by: PATHOLOGY

## 2020-11-12 PROCEDURE — 81001 URINALYSIS AUTO W/SCOPE: CPT | Performed by: PATHOLOGY

## 2020-11-12 PROCEDURE — 86140 C-REACTIVE PROTEIN: CPT | Performed by: PATHOLOGY

## 2020-11-12 PROCEDURE — 86704 HEP B CORE ANTIBODY TOTAL: CPT | Mod: 90 | Performed by: PATHOLOGY

## 2020-11-12 PROCEDURE — 84450 TRANSFERASE (AST) (SGOT): CPT | Performed by: PATHOLOGY

## 2020-11-12 PROCEDURE — 85025 COMPLETE CBC W/AUTO DIFF WBC: CPT | Performed by: PATHOLOGY

## 2020-11-12 PROCEDURE — 84443 ASSAY THYROID STIM HORMONE: CPT | Performed by: PATHOLOGY

## 2020-11-12 PROCEDURE — 36415 COLL VENOUS BLD VENIPUNCTURE: CPT | Performed by: PATHOLOGY

## 2020-11-12 PROCEDURE — 84460 ALANINE AMINO (ALT) (SGPT): CPT | Performed by: PATHOLOGY

## 2020-11-12 PROCEDURE — 84439 ASSAY OF FREE THYROXINE: CPT | Performed by: PATHOLOGY

## 2020-11-12 PROCEDURE — 86225 DNA ANTIBODY NATIVE: CPT | Mod: 90 | Performed by: PATHOLOGY

## 2020-11-12 NOTE — LETTER
November 17, 2020      Yvonne SERRANO Anderson  [address]  [facility] [address]        Dear ,    We are writing to inform you of your test results.    Anti-DNA, WBC improved. The rest of labs are stable.    Resulted Orders   Hepatitis C antibody   Result Value Ref Range    Hepatitis C Antibody Nonreactive NR^Nonreactive      Comment:      Assay performance characteristics have not been established for newborns,   infants, and children     Hepatitis B core antibody   Result Value Ref Range    Hepatitis B Core Gisela Nonreactive NR^Nonreactive   Hepatitis B surface antigen   Result Value Ref Range    Hep B Surface Agn Nonreactive NR^Nonreactive   Quantiferon TB Gold Plus   Result Value Ref Range    MTB Quantiferon Result Negative NEG^Negative      Comment:      No interferon gamma response to M.tuberculosis antigens was detected.   Infection with M.tuberculosis is unlikely, however a single negative result   does not exclude infection. In patients at high risk for infection, a second   test should be considered      TB1 Ag minus Nil 0.02 IU/mL    TB2 Ag minus Nil 0.00 IU/mL    Mitogen minus Nil 3.42 IU/mL    NIL Result 0.08 IU/mL   T4 free   Result Value Ref Range    T4 Free 1.18 0.76 - 1.46 ng/dL   TSH   Result Value Ref Range    TSH 2.22 0.40 - 4.00 mU/L   Creatinine random urine   Result Value Ref Range    Creatinine Urine Random 88 mg/dL   Protein  random urine with Creat Ratio   Result Value Ref Range    Protein Random Urine 0.26 g/L    Protein Total Urine g/gr Creatinine 0.30 (H) 0 - 0.2 g/g Cr   UA with Microscopic reflex to Culture   Result Value Ref Range    Color Urine Yellow     Appearance Urine Clear     Glucose Urine Negative NEG^Negative mg/dL    Bilirubin Urine Negative NEG^Negative    Ketones Urine Negative NEG^Negative mg/dL    Specific Gravity Urine 1.013 1.003 - 1.035    Blood Urine Negative NEG^Negative    pH Urine 7.0 5.0 - 7.0 pH    Protein Albumin Urine Negative NEG^Negative mg/dL     Urobilinogen mg/dL 0.0 0.0 - 2.0 mg/dL    Nitrite Urine Negative NEG^Negative    Leukocyte Esterase Urine Negative NEG^Negative    Source Midstream Urine     WBC Urine 1 0 - 5 /HPF    RBC Urine <1 0 - 2 /HPF    Mucous Urine Present (A) NEG^Negative /LPF   Erythrocyte sedimentation rate auto   Result Value Ref Range    Sed Rate 37 (H) 0 - 20 mm/h   DNA double stranded antibodies   Result Value Ref Range    DNA-ds 27 (H) <10 IU/mL      Comment:      Positive   CRP inflammation   Result Value Ref Range    CRP Inflammation <2.9 0.0 - 8.0 mg/L   Complement C3   Result Value Ref Range    Complement C3 74 (L) 81 - 157 mg/dL   Complement C4   Result Value Ref Range    Complement C4 3 (L) 13 - 39 mg/dL   Creatinine   Result Value Ref Range    Creatinine 0.44 (L) 0.52 - 1.04 mg/dL    GFR Estimate >90 >60 mL/min/[1.73_m2]      Comment:      Non  GFR Calc  Starting 12/18/2018, serum creatinine based estimated GFR (eGFR) will be   calculated using the Chronic Kidney Disease Epidemiology Collaboration   (CKD-EPI) equation.      GFR Estimate If Black >90 >60 mL/min/[1.73_m2]      Comment:       GFR Calc  Starting 12/18/2018, serum creatinine based estimated GFR (eGFR) will be   calculated using the Chronic Kidney Disease Epidemiology Collaboration   (CKD-EPI) equation.     CBC with platelets differential   Result Value Ref Range    WBC 1.6 (L) 4.0 - 11.0 10e9/L    RBC Count 4.04 3.8 - 5.2 10e12/L    Hemoglobin 11.8 11.7 - 15.7 g/dL    Hematocrit 38.0 35.0 - 47.0 %    MCV 94 78 - 100 fl    MCH 29.2 26.5 - 33.0 pg    MCHC 31.1 (L) 31.5 - 36.5 g/dL    RDW 12.9 10.0 - 15.0 %    Platelet Count 137 (L) 150 - 450 10e9/L    Diff Method Automated Method     % Neutrophils 66.5 %    % Lymphocytes 21.5 %    % Monocytes 11.4 %    % Eosinophils 0.6 %    % Basophils 0.0 %    % Immature Granulocytes 0.0 %    Nucleated RBCs 0 0 /100    Absolute Neutrophil 1.1 (L) 1.6 - 8.3 10e9/L    Absolute Lymphocytes 0.3 (L)  0.8 - 5.3 10e9/L    Absolute Monocytes 0.2 0.0 - 1.3 10e9/L    Absolute Eosinophils 0.0 0.0 - 0.7 10e9/L    Absolute Basophils 0.0 0.0 - 0.2 10e9/L    Abs Immature Granulocytes 0.0 0 - 0.4 10e9/L    Absolute Nucleated RBC 0.0    AST   Result Value Ref Range    AST 13 0 - 45 U/L   Albumin level   Result Value Ref Range    Albumin 3.5 3.4 - 5.0 g/dL   ALT   Result Value Ref Range    ALT 11 0 - 50 U/L   Creatinine urine calculation only   Result Value Ref Range    Creatinine Urine 88 mg/dL       If you have any questions or concerns, please call the clinic at the number listed above.       Sincerely,        Ezio Irby MD

## 2020-11-13 ENCOUNTER — VIRTUAL VISIT (OUTPATIENT)
Dept: RHEUMATOLOGY | Facility: CLINIC | Age: 25
End: 2020-11-13
Attending: INTERNAL MEDICINE
Payer: COMMERCIAL

## 2020-11-13 DIAGNOSIS — M32.9 SYSTEMIC LUPUS ERYTHEMATOSUS, UNSPECIFIED SLE TYPE, UNSPECIFIED ORGAN INVOLVEMENT STATUS (H): ICD-10-CM

## 2020-11-13 DIAGNOSIS — Z51.81 MEDICATION MONITORING ENCOUNTER: Primary | ICD-10-CM

## 2020-11-13 LAB
C3 SERPL-MCNC: 74 MG/DL (ref 81–157)
C4 SERPL-MCNC: 3 MG/DL (ref 13–39)
GAMMA INTERFERON BACKGROUND BLD IA-ACNC: 0.08 IU/ML
M TB IFN-G CD4+ BCKGRND COR BLD-ACNC: 3.42 IU/ML
M TB TUBERC IFN-G BLD QL: NEGATIVE
MITOGEN IGNF BCKGRD COR BLD-ACNC: 0 IU/ML
MITOGEN IGNF BCKGRD COR BLD-ACNC: 0.02 IU/ML

## 2020-11-13 PROCEDURE — 99443 PR PHYSICIAN TELEPHONE EVALUATION 21-30 MIN: CPT | Performed by: INTERNAL MEDICINE

## 2020-11-13 RX ORDER — MYCOPHENOLATE MOFETIL 500 MG/1
500 TABLET ORAL 2 TIMES DAILY
Qty: 120 TABLET | Refills: 3
Start: 2020-11-13 | End: 2020-11-28

## 2020-11-13 ASSESSMENT — PAIN SCALES - GENERAL: PAINLEVEL: NO PAIN (0)

## 2020-11-13 NOTE — LETTER
"11/13/2020       RE: Yvonne Barron  [address]  [place] [address]     Dear Colleague,    Thank you for referring your patient, Yvonne Barron, to the [facility] [place] [facility]. Please see a copy of my visit note below.    Yvonne Barron is a 25 year old female who is being evaluated via a billable telephone visit.      The patient has been notified of following:     \"This telephone visit will be conducted via a call between you and your physician/provider. We have found that certain health care needs can be provided without the need for a physical exam.  This service lets us provide the care you need with a short phone conversation.  If a prescription is necessary we can send it directly to your pharmacy.  If lab work is needed we can place an order for that and you can then stop by our lab to have the test done at a later time.    Telephone visits are billed at different rates depending on your insurance coverage. During this emergency period, for some insurers they may be billed the same as an in-person visit.  Please reach out to your insurance provider with any questions.    If during the course of the call the physician/provider feels a telephone visit is not appropriate, you will not be charged for this service.\"    Patient has given verbal consent for Telephone visit?  No    What phone number would you like to be contacted at? [phone]    How would you like to obtain your AVS? Mail a copy    Phone call duration: 27 minutes    Ezio Irby MD        Rheumatology Clinic F/U Virtual Visit Note    Last seen: 6/25/2020    DOS: 11/13/2020    (this is a phone visit due to COVID-19 outbreak), patient agreed      Reason for visit: SLE    HPI:    Yvonne is a 24 yo woman with a hx of Graves disease s/p radioactive iodine ablation in 2013, post-ablation hypothyroidism and SLE Dx in 11/2018 who presents " for follow up. She initially presented with raynaud's, rash over face and legs, nasal and oral ulcers, weight loss of 30 lbs, and severe fatigue. Was found to have Leukopenia, thrombocytopenia (130), sed rate 45, SHANDA 1:320, Sm>8, SSA>8, RNP>8, DS DNA 17, low C3 C4.    At last visit in 6/2020, we reduced the cellcept dose from 3 gr to 2 gr a day given weight loss.       She reports having dull pain over her legs. The pain is below the knees, over the shins. It feels like she walked all day. Both legs hurt at the end of the day.    Has hip pain L>r especially at night.     Acupuncture helps.    Her appetite is better, she is forcing herself to east ensure twice a day. She gets full with one spoon, tries to have 3 meals a day plus high calorie snacks. She is scheduled for EGD/colonoscopy early Jan for work up of weight loss.    She was going down to hit 100 lbs, butt gained some weight since last visit and is now 107 lbs.      Family hx negative for autoimmune disease, positive for diabetes and hypertension.      REVIEW OF SYMPTOMS:  A comprehensive ROS was done. Positives are per HPI.        HISTORY REVIEW:  Past Medical History:   Diagnosis Date     Hypothyroidism      Lupus (systemic lupus erythematosus) (H)      No past surgical history on file.  No family history on file.  Social History     Socioeconomic History     Marital status: Single     Spouse name: Not on file     Number of children: Not on file     Years of education: Not on file     Highest education level: Not on file   Occupational History     Not on file   Social Needs     Financial resource strain: Not on file     Food insecurity     Worry: Not on file     Inability: Not on file     Transportation needs     Medical: Not on file     Non-medical: Not on file   Tobacco Use     Smoking status: Never Smoker     Smokeless tobacco: Never Used   Substance and Sexual Activity     Alcohol use: No     Frequency: Never     Drug use: No     Sexual activity: Never    Lifestyle     Physical activity     Days per week: Not on file     Minutes per session: Not on file     Stress: Not on file   Relationships     Social connections     Talks on phone: Not on file     Gets together: Not on file     Attends Nondenominational service: Not on file     Active member of club or organization: Not on file     Attends meetings of clubs or organizations: Not on file     Relationship status: Not on file     Intimate partner violence     Fear of current or ex partner: Not on file     Emotionally abused: Not on file     Physically abused: Not on file     Forced sexual activity: Not on file   Other Topics Concern     Not on file   Social History Narrative     Not on file     Patient Active Problem List   Diagnosis     Systemic lupus erythematosus (H)     No Known Allergies    I reviewed the Current Medications:  Outpatient Medications Prior to Visit   Medication Sig Dispense Refill     hydroquinone (CRISTA) 4 % external cream Apply to hyperpigmented areas twice a day 30 g 3     hydroxychloroquine (PLAQUENIL) 200 MG tablet 200 mg bid on Mon/Wed/Fri and 200 mg a day the rest of the week 135 tablet 0     levothyroxine (SYNTHROID/LEVOTHROID) 75 MCG tablet Take 75 mcg by mouth daily       sildenafil (REVATIO) 20 MG tablet Take 1 tablet (20 mg) by mouth 3 times daily 270 tablet 2     tacrolimus (PROTOPIC) 0.1 % external ointment Apply to facial rash twice a day 60 g 3     tretinoin (RETIN-A) 0.025 % external cream Use every night 45 g 1     tiZANidine (ZANAFLEX) 4 MG tablet Take 1 tablet (4 mg) by mouth 3 times daily as needed for muscle spasms 60 tablet 0     gabapentin (NEURONTIN) 300 MG capsule Increase by 300mg every 3 days up to 600mg three times daily as needed (Patient not taking: Reported on 9/24/2020) 90 capsule 3     hydroquinone (CRISTA) 4 % external cream Apply to face BID for 3 months then take 3 month break.       mycophenolate (GENERIC EQUIVALENT) 500 MG tablet Take 2 tablets (1,000 mg) by  mouth 2 times daily 120 tablet 3     No facility-administered medications prior to visit.        PHYSICAL EXAM  Not done  Component      Latest Ref Rng & Units 11/12/2020   WBC      4.0 - 11.0 10e9/L 1.6 (L)   RBC Count      3.8 - 5.2 10e12/L 4.04   Hemoglobin      11.7 - 15.7 g/dL 11.8   Hematocrit      35.0 - 47.0 % 38.0   MCV      78 - 100 fl 94   MCH      26.5 - 33.0 pg 29.2   MCHC      31.5 - 36.5 g/dL 31.1 (L)   RDW      10.0 - 15.0 % 12.9   Platelet Count      150 - 450 10e9/L 137 (L)   Diff Method       Automated Method   % Neutrophils      % 66.5   % Lymphocytes      % 21.5   % Monocytes      % 11.4   % Eosinophils      % 0.6   % Basophils      % 0.0   % Immature Granulocytes      % 0.0   Nucleated RBCs      0 /100 0   Absolute Neutrophil      1.6 - 8.3 10e9/L 1.1 (L)   Absolute Lymphocytes      0.8 - 5.3 10e9/L 0.3 (L)   Absolute Monocytes      0.0 - 1.3 10e9/L 0.2   Absolute Eosinophils      0.0 - 0.7 10e9/L 0.0   Absolute Basophils      0.0 - 0.2 10e9/L 0.0   Abs Immature Granulocytes      0 - 0.4 10e9/L 0.0   Absolute Nucleated RBC       0.0   Color Urine       Yellow   Appearance Urine       Clear   Glucose Urine      NEG:Negative mg/dL Negative   Bilirubin Urine      NEG:Negative Negative   Ketones Urine      NEG:Negative mg/dL Negative   Specific Gravity Urine      1.003 - 1.035 1.013   Blood Urine      NEG:Negative Negative   pH Urine      5.0 - 7.0 pH 7.0   Protein Albumin Urine      NEG:Negative mg/dL Negative   Urobilinogen mg/dL      0.0 - 2.0 mg/dL 0.0   Nitrite Urine      NEG:Negative Negative   Leukocyte Esterase Urine      NEG:Negative Negative   Source       Midstream Urine   WBC Urine      0 - 5 /HPF 1   RBC Urine      0 - 2 /HPF <1   Mucous Urine      NEG:Negative /LPF Present (A)   MTB Quantiferon Result      NEG:Negative Negative   TB1 Ag minus Nil      IU/mL 0.02   TB2 Ag minus Nil      IU/mL 0.00   Mitogen minus Nil      IU/mL 3.42   Nil Result      IU/mL 0.08   Creatinine      0.52 -  1.04 mg/dL 0.44 (L)   GFR Estimate      >60 mL/min/1.73:m2 >90   GFR Estimate If Black      >60 mL/min/1.73:m2 >90   Protein Random Urine      g/L 0.26   Protein Total Urine g/gr Creatinine      0 - 0.2 g/g Cr 0.30 (H)   Hepatitis C Antibody      NR:Nonreactive Nonreactive   Hepatitis B Core Gisela      NR:Nonreactive Nonreactive   Hep B Surface Agn      NR:Nonreactive Nonreactive   T4 Free      0.76 - 1.46 ng/dL 1.18   TSH      0.40 - 4.00 mU/L 2.22   Creatinine Urine Random      mg/dL 88   Sed Rate      0 - 20 mm/h 37 (H)   DNA-ds      <10 IU/mL 27 (H)   CRP Inflammation      0.0 - 8.0 mg/L <2.9   Complement C3      81 - 157 mg/dL 74 (L)   Complement C4      13 - 39 mg/dL 3 (L)   AST      0 - 45 U/L 13   Albumin      3.4 - 5.0 g/dL 3.5   ALT      0 - 50 U/L 11   Creatinine Urine      mg/dL 88       ASSESSMENT and PLAN:    SLE with weight loss, ongoing arthralgia, active lupus serology (+anti-DNA, low C3/C4 11/2020), leukopenia (1.6 in 11/2020) but resolved chill julia lesions over toes. She is now up to 107 lbs from almost 100 lbs since 6/2020, we reduced cellcept from 3 to 2 gram a day in 6/2020 and will further taper as it might be causing weight loss. We talked about trying benlysta as only FDA approved biologic for lupus to address arthralgia and replace cellcept. After discussing risks and benefits, Yvonne agreed to try it.    Today plan of care:    Will get benlysta insurance approval    Taper cellcept from 2 tabs of 500 mg twice a day to 1 tab of 500 mg bid. Labs in one month      Continue revatio for Raynaud's    Continue plaquenil 2 tabs a day M/W/F, 1 tab a day the rest of the week (adjuted the dose given weight loss) with annual eye exam    GI follow up for weight loss, might cancel EGD/colooscopy if continues to gain weight by tapering cellcept    RTC 3 months    Orders Placed This Encounter   Procedures     ALT     Albumin level     AST     CBC with platelets differential     Creatinine     Complement  C4     Complement C3     CRP inflammation     DNA double stranded antibodies     Erythrocyte sedimentation rate auto     UA with Microscopic reflex to Culture     Protein  random urine with Creat Ratio     Creatinine random urine       Telephone visit: 27 min    Ezio Irby MD

## 2020-11-13 NOTE — PATIENT INSTRUCTIONS
Cut back on cellcept to 1 tab twice a day      [facility]      Labs in a month      Return in 3 months

## 2020-11-13 NOTE — PROGRESS NOTES
"Yvonne Barron is a 25 year old female who is being evaluated via a billable telephone visit.      The patient has been notified of following:     \"This telephone visit will be conducted via a call between you and your physician/provider. We have found that certain health care needs can be provided without the need for a physical exam.  This service lets us provide the care you need with a short phone conversation.  If a prescription is necessary we can send it directly to your pharmacy.  If lab work is needed we can place an order for that and you can then stop by our lab to have the test done at a later time.    Telephone visits are billed at different rates depending on your insurance coverage. During this emergency period, for some insurers they may be billed the same as an in-person visit.  Please reach out to your insurance provider with any questions.    If during the course of the call the physician/provider feels a telephone visit is not appropriate, you will not be charged for this service.\"    Patient has given verbal consent for Telephone visit?  No    What phone number would you like to be contacted at? [phone]    How would you like to obtain your AVS? Mail a copy    Phone call duration: 27 minutes    Ezio Irby MD      "

## 2020-11-16 LAB — DSDNA AB SER-ACNC: 27 IU/ML

## 2020-11-17 DIAGNOSIS — M32.9 SYSTEMIC LUPUS ERYTHEMATOSUS, UNSPECIFIED SLE TYPE, UNSPECIFIED ORGAN INVOLVEMENT STATUS (H): ICD-10-CM

## 2020-11-17 RX ORDER — ACETAMINOPHEN 325 MG/1
650 TABLET ORAL ONCE
Status: CANCELLED
Start: 2020-11-23

## 2020-11-17 RX ORDER — DIPHENHYDRAMINE HCL 25 MG
25 CAPSULE ORAL ONCE
Status: CANCELLED
Start: 2020-11-23

## 2020-11-17 RX ORDER — HEPARIN SODIUM (PORCINE) LOCK FLUSH IV SOLN 100 UNIT/ML 100 UNIT/ML
5 SOLUTION INTRAVENOUS
Status: CANCELLED | OUTPATIENT
Start: 2020-11-23

## 2020-11-17 RX ORDER — HEPARIN SODIUM,PORCINE 10 UNIT/ML
5 VIAL (ML) INTRAVENOUS
Status: CANCELLED | OUTPATIENT
Start: 2020-11-23

## 2020-11-17 RX ORDER — METHYLPREDNISOLONE SODIUM SUCCINATE 125 MG/2ML
125 INJECTION, POWDER, LYOPHILIZED, FOR SOLUTION INTRAMUSCULAR; INTRAVENOUS ONCE
Status: CANCELLED | OUTPATIENT
Start: 2020-11-23

## 2020-11-18 DIAGNOSIS — Z11.59 ENCOUNTER FOR SCREENING FOR OTHER VIRAL DISEASES: Primary | ICD-10-CM

## 2020-11-21 DIAGNOSIS — M32.9 SYSTEMIC LUPUS ERYTHEMATOSUS, UNSPECIFIED SLE TYPE, UNSPECIFIED ORGAN INVOLVEMENT STATUS (H): Primary | ICD-10-CM

## 2020-11-28 ENCOUNTER — MYC REFILL (OUTPATIENT)
Dept: RHEUMATOLOGY | Facility: CLINIC | Age: 25
End: 2020-11-28

## 2020-11-28 DIAGNOSIS — M32.9 SYSTEMIC LUPUS ERYTHEMATOSUS, UNSPECIFIED SLE TYPE, UNSPECIFIED ORGAN INVOLVEMENT STATUS (H): ICD-10-CM

## 2020-12-01 RX ORDER — MYCOPHENOLATE MOFETIL 500 MG/1
500 TABLET ORAL 2 TIMES DAILY
Qty: 120 TABLET | Refills: 3 | Status: SHIPPED | OUTPATIENT
Start: 2020-12-01 | End: 2021-06-29

## 2020-12-05 DIAGNOSIS — M32.9 SYSTEMIC LUPUS ERYTHEMATOSUS, UNSPECIFIED SLE TYPE, UNSPECIFIED ORGAN INVOLVEMENT STATUS (H): ICD-10-CM

## 2020-12-05 LAB
SARS-COV-2 RNA SPEC QL NAA+PROBE: NORMAL
SPECIMEN SOURCE: NORMAL

## 2020-12-05 PROCEDURE — U0003 INFECTIOUS AGENT DETECTION BY NUCLEIC ACID (DNA OR RNA); SEVERE ACUTE RESPIRATORY SYNDROME CORONAVIRUS 2 (SARS-COV-2) (CORONAVIRUS DISEASE [COVID-19]), AMPLIFIED PROBE TECHNIQUE, MAKING USE OF HIGH THROUGHPUT TECHNOLOGIES AS DESCRIBED BY CMS-2020-01-R: HCPCS | Performed by: INTERNAL MEDICINE

## 2020-12-06 LAB
LABORATORY COMMENT REPORT: NORMAL
SARS-COV-2 RNA SPEC QL NAA+PROBE: NEGATIVE
SPECIMEN SOURCE: NORMAL

## 2020-12-08 ENCOUNTER — INFUSION THERAPY VISIT (OUTPATIENT)
Dept: INFUSION THERAPY | Facility: CLINIC | Age: 25
End: 2020-12-08
Attending: INTERNAL MEDICINE
Payer: COMMERCIAL

## 2020-12-08 VITALS
TEMPERATURE: 97.9 F | HEART RATE: 82 BPM | WEIGHT: 108 LBS | OXYGEN SATURATION: 100 % | RESPIRATION RATE: 16 BRPM | SYSTOLIC BLOOD PRESSURE: 91 MMHG | BODY MASS INDEX: 17.43 KG/M2 | DIASTOLIC BLOOD PRESSURE: 61 MMHG

## 2020-12-08 DIAGNOSIS — M32.9 SYSTEMIC LUPUS ERYTHEMATOSUS, UNSPECIFIED SLE TYPE, UNSPECIFIED ORGAN INVOLVEMENT STATUS (H): Primary | ICD-10-CM

## 2020-12-08 DIAGNOSIS — Z51.81 MEDICATION MONITORING ENCOUNTER: ICD-10-CM

## 2020-12-08 LAB
ALBUMIN SERPL-MCNC: 3.3 G/DL (ref 3.4–5)
ALT SERPL W P-5'-P-CCNC: 13 U/L (ref 0–50)
AST SERPL W P-5'-P-CCNC: 15 U/L (ref 0–45)
BASOPHILS # BLD AUTO: 0 10E9/L (ref 0–0.2)
BASOPHILS NFR BLD AUTO: 0.6 %
CREAT SERPL-MCNC: 0.5 MG/DL (ref 0.52–1.04)
CRP SERPL-MCNC: <2.9 MG/L (ref 0–8)
DIFFERENTIAL METHOD BLD: ABNORMAL
EOSINOPHIL # BLD AUTO: 0 10E9/L (ref 0–0.7)
EOSINOPHIL NFR BLD AUTO: 0.6 %
ERYTHROCYTE [DISTWIDTH] IN BLOOD BY AUTOMATED COUNT: 12.7 % (ref 10–15)
ERYTHROCYTE [SEDIMENTATION RATE] IN BLOOD BY WESTERGREN METHOD: 52 MM/H (ref 0–20)
GFR SERPL CREATININE-BSD FRML MDRD: >90 ML/MIN/{1.73_M2}
HCT VFR BLD AUTO: 35.7 % (ref 35–47)
HGB BLD-MCNC: 11 G/DL (ref 11.7–15.7)
IMM GRANULOCYTES # BLD: 0 10E9/L (ref 0–0.4)
IMM GRANULOCYTES NFR BLD: 0 %
LYMPHOCYTES # BLD AUTO: 0.5 10E9/L (ref 0.8–5.3)
LYMPHOCYTES NFR BLD AUTO: 30.7 %
MCH RBC QN AUTO: 28.4 PG (ref 26.5–33)
MCHC RBC AUTO-ENTMCNC: 30.8 G/DL (ref 31.5–36.5)
MCV RBC AUTO: 92 FL (ref 78–100)
MONOCYTES # BLD AUTO: 0.2 10E9/L (ref 0–1.3)
MONOCYTES NFR BLD AUTO: 12.3 %
NEUTROPHILS # BLD AUTO: 0.9 10E9/L (ref 1.6–8.3)
NEUTROPHILS NFR BLD AUTO: 55.8 %
NRBC # BLD AUTO: 0 10*3/UL
NRBC BLD AUTO-RTO: 0 /100
PLATELET # BLD AUTO: 149 10E9/L (ref 150–450)
RBC # BLD AUTO: 3.87 10E12/L (ref 3.8–5.2)
WBC # BLD AUTO: 1.6 10E9/L (ref 4–11)

## 2020-12-08 PROCEDURE — 96365 THER/PROPH/DIAG IV INF INIT: CPT

## 2020-12-08 PROCEDURE — 250N000011 HC RX IP 250 OP 636: Performed by: INTERNAL MEDICINE

## 2020-12-08 PROCEDURE — 85652 RBC SED RATE AUTOMATED: CPT | Performed by: INTERNAL MEDICINE

## 2020-12-08 PROCEDURE — 86140 C-REACTIVE PROTEIN: CPT | Performed by: INTERNAL MEDICINE

## 2020-12-08 PROCEDURE — 84450 TRANSFERASE (AST) (SGOT): CPT | Performed by: INTERNAL MEDICINE

## 2020-12-08 PROCEDURE — 86160 COMPLEMENT ANTIGEN: CPT | Performed by: INTERNAL MEDICINE

## 2020-12-08 PROCEDURE — 36415 COLL VENOUS BLD VENIPUNCTURE: CPT

## 2020-12-08 PROCEDURE — 86225 DNA ANTIBODY NATIVE: CPT | Performed by: INTERNAL MEDICINE

## 2020-12-08 PROCEDURE — 82040 ASSAY OF SERUM ALBUMIN: CPT | Performed by: INTERNAL MEDICINE

## 2020-12-08 PROCEDURE — 96375 TX/PRO/DX INJ NEW DRUG ADDON: CPT

## 2020-12-08 PROCEDURE — 84460 ALANINE AMINO (ALT) (SGPT): CPT | Performed by: INTERNAL MEDICINE

## 2020-12-08 PROCEDURE — 250N000013 HC RX MED GY IP 250 OP 250 PS 637: Performed by: INTERNAL MEDICINE

## 2020-12-08 PROCEDURE — 258N000003 HC RX IP 258 OP 636: Performed by: INTERNAL MEDICINE

## 2020-12-08 PROCEDURE — 85025 COMPLETE CBC W/AUTO DIFF WBC: CPT | Performed by: INTERNAL MEDICINE

## 2020-12-08 PROCEDURE — 82565 ASSAY OF CREATININE: CPT | Performed by: INTERNAL MEDICINE

## 2020-12-08 RX ORDER — HEPARIN SODIUM,PORCINE 10 UNIT/ML
5 VIAL (ML) INTRAVENOUS
Status: CANCELLED | OUTPATIENT
Start: 2020-12-22

## 2020-12-08 RX ORDER — DIPHENHYDRAMINE HCL 25 MG
25 CAPSULE ORAL ONCE
Status: CANCELLED
Start: 2020-12-22

## 2020-12-08 RX ORDER — HEPARIN SODIUM (PORCINE) LOCK FLUSH IV SOLN 100 UNIT/ML 100 UNIT/ML
5 SOLUTION INTRAVENOUS
Status: CANCELLED | OUTPATIENT
Start: 2020-12-22

## 2020-12-08 RX ORDER — METHYLPREDNISOLONE SODIUM SUCCINATE 125 MG/2ML
125 INJECTION, POWDER, LYOPHILIZED, FOR SOLUTION INTRAMUSCULAR; INTRAVENOUS ONCE
Status: CANCELLED | OUTPATIENT
Start: 2020-12-22

## 2020-12-08 RX ORDER — METHYLPREDNISOLONE SODIUM SUCCINATE 125 MG/2ML
125 INJECTION, POWDER, LYOPHILIZED, FOR SOLUTION INTRAMUSCULAR; INTRAVENOUS ONCE
Status: COMPLETED | OUTPATIENT
Start: 2020-12-08 | End: 2020-12-08

## 2020-12-08 RX ORDER — ACETAMINOPHEN 325 MG/1
650 TABLET ORAL ONCE
Status: COMPLETED | OUTPATIENT
Start: 2020-12-08 | End: 2020-12-08

## 2020-12-08 RX ORDER — ACETAMINOPHEN 325 MG/1
650 TABLET ORAL ONCE
Status: CANCELLED
Start: 2020-12-22

## 2020-12-08 RX ORDER — DIPHENHYDRAMINE HCL 25 MG
25 CAPSULE ORAL ONCE
Status: COMPLETED | OUTPATIENT
Start: 2020-12-08 | End: 2020-12-08

## 2020-12-08 RX ADMIN — BELIMUMAB 520 MG: 400 INJECTION, POWDER, LYOPHILIZED, FOR SOLUTION INTRAVENOUS at 09:47

## 2020-12-08 RX ADMIN — ACETAMINOPHEN 650 MG: 325 TABLET ORAL at 09:31

## 2020-12-08 RX ADMIN — METHYLPREDNISOLONE SODIUM SUCCINATE 125 MG: 125 INJECTION, POWDER, FOR SOLUTION INTRAMUSCULAR; INTRAVENOUS at 09:35

## 2020-12-08 RX ADMIN — DIPHENHYDRAMINE HYDROCHLORIDE 25 MG: 25 CAPSULE ORAL at 09:31

## 2020-12-08 NOTE — LETTER
December 12, 2020      Yvonne Barron  [address]  [place] [address]        Dear ,    We are writing to inform you of your test results.    Increased anti-DNA, ESR but the rest of labs are stable. This could be from reducing cellcept dose. How are you feeling? Any flare ups? More weight loss? I expect labs to improve on benlysta. Will re-check labs on 1/4 with benlysta infusion.    Resulted Orders   Erythrocyte sedimentation rate auto   Result Value Ref Range    Sed Rate 52 (H) 0 - 20 mm/h   DNA double stranded antibodies   Result Value Ref Range    DNA-ds 100 (H) <10 IU/mL      Comment:      Positive   CRP inflammation   Result Value Ref Range    CRP Inflammation <2.9 0.0 - 8.0 mg/L   Complement C3   Result Value Ref Range    Complement C3 70 (L) 81 - 157 mg/dL   Complement C4   Result Value Ref Range    Complement C4 2 (L) 13 - 39 mg/dL   Creatinine   Result Value Ref Range    Creatinine 0.50 (L) 0.52 - 1.04 mg/dL    GFR Estimate >90 >60 mL/min/[1.73_m2]      Comment:      Non  GFR Calc  Starting 12/18/2018, serum creatinine based estimated GFR (eGFR) will be   calculated using the Chronic Kidney Disease Epidemiology Collaboration   (CKD-EPI) equation.      GFR Estimate If Black >90 >60 mL/min/[1.73_m2]      Comment:       GFR Calc  Starting 12/18/2018, serum creatinine based estimated GFR (eGFR) will be   calculated using the Chronic Kidney Disease Epidemiology Collaboration   (CKD-EPI) equation.     CBC with platelets differential   Result Value Ref Range    WBC 1.6 (L) 4.0 - 11.0 10e9/L    RBC Count 3.87 3.8 - 5.2 10e12/L    Hemoglobin 11.0 (L) 11.7 - 15.7 g/dL    Hematocrit 35.7 35.0 - 47.0 %    MCV 92 78 - 100 fl    MCH 28.4 26.5 - 33.0 pg    MCHC 30.8 (L) 31.5 - 36.5 g/dL    RDW 12.7 10.0 - 15.0 %    Platelet Count 149 (L) 150 - 450 10e9/L    Diff Method Automated Method     % Neutrophils 55.8 %    % Lymphocytes 30.7 %    % Monocytes 12.3  %    % Eosinophils 0.6 %    % Basophils 0.6 %    % Immature Granulocytes 0.0 %    Nucleated RBCs 0 0 /100    Absolute Neutrophil 0.9 (L) 1.6 - 8.3 10e9/L    Absolute Lymphocytes 0.5 (L) 0.8 - 5.3 10e9/L    Absolute Monocytes 0.2 0.0 - 1.3 10e9/L    Absolute Eosinophils 0.0 0.0 - 0.7 10e9/L    Absolute Basophils 0.0 0.0 - 0.2 10e9/L    Abs Immature Granulocytes 0.0 0 - 0.4 10e9/L    Absolute Nucleated RBC 0.0    AST   Result Value Ref Range    AST 15 0 - 45 U/L   Albumin level   Result Value Ref Range    Albumin 3.3 (L) 3.4 - 5.0 g/dL   ALT   Result Value Ref Range    ALT 13 0 - 50 U/L       If you have any questions or concerns, please call the clinic at the number listed above.       Sincerely,      Ezio Irby MD

## 2020-12-08 NOTE — LETTER
12/8/2020         RE: Yvonne Barron  [address]  [place] [address]        Dear Colleague,    Thank you for referring your patient, Yvonne Barron, to the [facility] TREAT[facility]. Please see a copy of my visit note below.    Nursing Note  Yvonne Barron presents [facility] for:   Chief Complaint   Patient presents with     Infusion     Benlysta     During today's [facility] appointment, orders from Dr Irby were completed.  Frequency: first dose    Progress note:  Patient identification verified by name and date of birth.  Assessment completed.  Vitals recorded in Doc Flowsheets.  Patient was provided with education regarding medication/procedure and possible side effects.  Patient verbalized understanding.     present during visit today: Not Applicable.    Treatment Conditions: ~~~ NOTE: If the patient answers yes to any of the questions below, hold the infusion and contact ordering provider or on-call provider.    1. Have you recently had an elevated temperature, fever, chills, productive cough, coughing for 3 weeks or longer or hemoptysis, abnormal vital signs, night sweats,  chest pain or have you noticed a decrease in your appetite, unexplained weight loss or fatigue? No  2. Do you have any open wounds or new incisions? No  3. Do you have any recent or upcoming hospitalizations, surgeries or dental procedures? No  4. Do you currently have or recently have had any signs of illness or infection or are you on any antibiotics? No  5. Have you had any new, sudden or worsening abdominal pain? No  6. Have you or anyone in your household received a live vaccination in the past 4 weeks? Please note:  No live vaccines while on biologic/chemotherapy until 6 months after the last treatment.  Patient can receive the flu vaccine (shot only) and the pneumovax.  It is optimal for  the patient to get these vaccines mid cycle, but they can be given at any time as long as it is not on the day of the infusion. No  7. Have you recently been diagnosed with any new nervous system diseases (ie. Multiple sclerosis, Guillain [place], seizures, neurological changes) or cancer diagnosis? No  8. Are you on any form of radiation or chemotherapy? No  9. Are you pregnant or breast feeding or do you have plans of pregnancy in the future? No  10. Have you been having any signs of worsening depression or suicidal ideations?  (benlysta only) No  11. Have there been any other new onset medical symptoms? No      Premedications: administered per order.    Drug Waste Record: No    Infusion length and rate:  infusion given over approximately 1 hours    Labs: were drawn per orders.     Vascular access: peripheral IV placed today.    Post Infusion Assessment:  Patient tolerated infusion without incident.  No evidence of extravasations.  Access discontinued per protocol.  Biologic Infusion Post Education: Call the triage nurse at your clinic or seek medical attention if you have chills and/or temperature greater than or equal to 100.5, uncontrolled nausea/vomiting, diarrhea, constipation, dizziness, shortness of breath, chest pain, heart palpitations, weakness or any other new or concerning symptoms, questions or concerns.  You cannot have any live virus vaccines prior to or during treatment or up to 6 months post infusion.  If you have an upcoming surgery, medical procedure or dental procedure during treatment, this should be discussed with your ordering physician and your surgeon/dentist.  If you are having any concerning symptom, if you are unsure if you should get your next infusion or wish to speak to a provider before your next infusion, please call your care coordinator or triage nurse at your clinic to notify them so we can adequately serve you.     Discharge Plan:   Follow up plan of care with: ongoing infusions  at [facility].  Discharge instructions were reviewed with patient.  Patient/representative verbalized understanding of discharge instructions and all questions answered.  Patient discharged from [facility] in stable condition.    SIERRA ALATORRE RN    Administrations This Visit     acetaminophen (TYLENOL) tablet 650 mg     Admin Date  12/08/2020 Action  Given Dose  650 mg Route  Oral Administered By  Sierra Alatorre RN          belimumab (BENLYSTA) 520 mg in sodium chloride 0.9 % 282 mL INFUSION     Admin Date  12/08/2020 Action  New Bag Dose  520 mg Rate  282 mL/hr Route  Intravenous Administered By  Sierra Alatorre RN          diphenhydrAMINE (BENADRYL) capsule 25 mg     Admin Date  12/08/2020 Action  Given Dose  25 mg Route  Oral Administered By  Sierra Alatorre RN          methylPREDNISolone sodium succinate (solu-MEDROL) injection 125 mg     Admin Date  12/08/2020 Action  Given Dose  125 mg Route  Intravenous Administered By  Sierra Alatorre RN                BP 91/60   Pulse 81   Temp 97.9  F (36.6  C) (Oral)   Resp 16   Wt 49 kg (108 lb)   SpO2 100%   BMI 17.43 kg/m            Again, thank you for allowing me to participate in the care of your patient.        Sincerely,        [facility]

## 2020-12-08 NOTE — PROGRESS NOTES
Nursing Note  Yvonne SERRANO Anderson presents today to [facility] for:   Chief Complaint   Patient presents with     Infusion     Benlysta     During [facility]'s [facility] appointment, orders from Dr Irby were completed.  Frequency: first dose    Progress note:  Patient identification verified by name and date of birth.  Assessment completed.  Vitals recorded in Doc Flowsheets.  Patient was provided with education regarding medication/procedure and possible side effects.  Patient verbalized understanding.     present during visit today: Not Applicable.    Treatment Conditions: ~~~ NOTE: If the patient answers yes to any of the questions below, hold the infusion and contact ordering provider or on-call provider.    1. Have you recently had an elevated temperature, fever, chills, productive cough, coughing for 3 weeks or longer or hemoptysis, abnormal vital signs, night sweats,  chest pain or have you noticed a decrease in your appetite, unexplained weight loss or fatigue? No  2. Do you have any open wounds or new incisions? No  3. Do you have any recent or upcoming hospitalizations, surgeries or dental procedures? No  4. Do you currently have or recently have had any signs of illness or infection or are you on any antibiotics? No  5. Have you had any new, sudden or worsening abdominal pain? No  6. Have you or anyone in your household received a live vaccination in the past 4 weeks? Please note:  No live vaccines while on biologic/chemotherapy until 6 months after the last treatment.  Patient can receive the flu vaccine (shot only) and the pneumovax.  It is optimal for the patient to get these vaccines mid cycle, but they can be given at any time as long as it is not on the day of the infusion. No  7. Have you recently been diagnosed with any new nervous system diseases (ie. Multiple sclerosis, Guillain [place], seizures, neurological changes) or cancer  diagnosis? No  8. Are you on any form of radiation or chemotherapy? No  9. Are you pregnant or breast feeding or do you have plans of pregnancy in the future? No  10. Have you been having any signs of worsening depression or suicidal ideations?  (benlysta only) No  11. Have there been any other new onset medical symptoms? No      Premedications: administered per order.    Drug Waste Record: No    Infusion length and rate:  infusion given over approximately 1 hours    Labs: were drawn per orders.     Vascular access: peripheral IV placed today.    Post Infusion Assessment:  Patient tolerated infusion without incident.  No evidence of extravasations.  Access discontinued per protocol.  Biologic Infusion Post Education: Call the triage nurse at your clinic or seek medical attention if you have chills and/or temperature greater than or equal to 100.5, uncontrolled nausea/vomiting, diarrhea, constipation, dizziness, shortness of breath, chest pain, heart palpitations, weakness or any other new or concerning symptoms, questions or concerns.  You cannot have any live virus vaccines prior to or during treatment or up to 6 months post infusion.  If you have an upcoming surgery, medical procedure or dental procedure during treatment, this should be discussed with your ordering physician and your surgeon/dentist.  If you are having any concerning symptom, if you are unsure if you should get your next infusion or wish to speak to a provider before your next infusion, please call your care coordinator or triage nurse at your clinic to notify them so we can adequately serve you.     Discharge Plan:   Follow up plan of care with: ongoing infusions at [facility].  Discharge instructions were reviewed with patient.  Patient/representative verbalized understanding of discharge instructions and all questions answered.  Patient discharged from [facility] in stable  condition.    SIERRA ALATORRE RN    Administrations This Visit     acetaminophen (TYLENOL) tablet 650 mg     Admin Date  12/08/2020 Action  Given Dose  650 mg Route  Oral Administered By  Sierra Alatorre RN          belimumab (BENLYSTA) 520 mg in sodium chloride 0.9 % 282 mL INFUSION     Admin Date  12/08/2020 Action  New Bag Dose  520 mg Rate  282 mL/hr Route  Intravenous Administered By  Sierra Alatorre, RN          diphenhydrAMINE (BENADRYL) capsule 25 mg     Admin Date  12/08/2020 Action  Given Dose  25 mg Route  Oral Administered By  Sierra Alatorre RN          methylPREDNISolone sodium succinate (solu-MEDROL) injection 125 mg     Admin Date  12/08/2020 Action  Given Dose  125 mg Route  Intravenous Administered By  Sierra Alatorre RN                BP 91/60   Pulse 81   Temp 97.9  F (36.6  C) (Oral)   Resp 16   Wt 49 kg (108 lb)   SpO2 100%   BMI 17.43 kg/m

## 2020-12-09 LAB
C3 SERPL-MCNC: 70 MG/DL (ref 81–157)
C4 SERPL-MCNC: 2 MG/DL (ref 13–39)
DSDNA AB SER-ACNC: 100 IU/ML

## 2020-12-12 DIAGNOSIS — M32.9 SYSTEMIC LUPUS ERYTHEMATOSUS, UNSPECIFIED SLE TYPE, UNSPECIFIED ORGAN INVOLVEMENT STATUS (H): Primary | ICD-10-CM

## 2020-12-12 DIAGNOSIS — Z51.81 MEDICATION MONITORING ENCOUNTER: ICD-10-CM

## 2020-12-12 NOTE — PROGRESS NOTES
[facility]he[facility]ology Clinic F/U Virtual Visit Note    Last seen: 6/25/2020    DOS: 11/13/2020    (this is a phone visit due to COVID-19 outbreak), patient agreed      Reason for visit: SLE    HPI:    Yvonne is a 26 yo woman with a hx of Graves disease s/p radioactive iodine ablation in 2013, post-ablation hypothyroidism and SLE Dx in 11/2018 who presents for follow up. She initially presented with raynaud's, rash over face and legs, nasal and oral ulcers, weight loss of 30 lbs, and severe fatigue. Was found to have Leukopenia, thrombocytopenia (130), sed rate 45, SHANDA 1:320, Sm>8, SSA>8, RNP>8, DS DNA 17, low C3 C4.    At last visit in 6/2020, we reduced the cellcept dose from 3 gr to 2 gr a day given weight loss.       She reports having dull pain over her legs. The pain is below the knees, over the shins. It feels like she walked all day. Both legs hurt at the end of the day.    Has hip pain L>r especially at night.     Acupuncture helps.    Her appetite is better, she is forcing herself to east ensure twice a day. She gets full with one spoon, tries to have 3 meals a day plus high calorie snacks. She is scheduled for EGD/colonoscopy early Jan for work up of weight loss.    She was going down to hit 100 lbs, butt gained some weight since last visit and is now 107 lbs.      Family hx negative for autoimmune disease, positive for diabetes and hypertension.      REVIEW OF SYMPTOMS:  A comprehensive ROS was done. Positives are per HPI.        HISTORY REVIEW:  Past Medical History:   Diagnosis Date     Hypothyroidism      Lupus (systemic lupus erythematosus) (H)      No past surgical history on file.  No family history on file.  Social History     Socioeconomic History     Marital status: Single     Spouse name: Not on file     Number of children: Not on file     Years of education: Not on file     Highest education level: Not on file   Occupational History     Not on file   Social Needs     Financial resource strain: Not on  file     Food insecurity     Worry: Not on file     Inability: Not on file     Transportation needs     Medical: Not on file     Non-medical: Not on file   Tobacco Use     Smoking status: Never Smoker     Smokeless tobacco: Never Used   Substance and Sexual Activity     Alcohol use: No     Frequency: Never     Drug use: No     Sexual activity: Never   Lifestyle     Physical activity     Days per week: Not on file     Minutes per session: Not on file     Stress: Not on file   Relationships     Social connections     Talks on phone: Not on file     Gets together: Not on file     Attends [employer] service: Not on file     Active member of club or organization: Not on file     Attends meetings of clubs or organizations: Not on file     Relationship status: Not on file     Intimate partner violence     Fear of current or ex partner: Not on file     Emotionally abused: Not on file     Physically abused: Not on file     Forced sexual activity: Not on file   Other Topics Concern     Not on file   Social History Narrative     Not on file     Patient Active Problem List   Diagnosis     Systemic lupus erythematosus (H)     No Known Allergies    I reviewed the Current Medications:  Outpatient Medications Prior to Visit   Medication Sig Dispense Refill     hydroquinone (CRISTA) 4 % external cream Apply to hyperpigmented areas twice a day 30 g 3     hydroxychloroquine (PLAQUENIL) 200 MG tablet 200 mg bid on Mon/Wed/Fri and 200 mg a day the rest of the week 135 tablet 0     levothyroxine (SYNTHROID/LEVOTHROID) 75 MCG tablet Take 75 mcg by mouth daily       sildenafil (REVATIO) 20 MG tablet Take 1 tablet (20 mg) by mouth 3 times daily 270 tablet 2     tacrolimus (PROTOPIC) 0.1 % external ointment Apply to facial rash twice a day 60 g 3     tretinoin (RETIN-A) 0.025 % external cream Use every night 45 g 1     tiZANidine (ZANAFLEX) 4 MG tablet Take 1 tablet (4 mg) by mouth 3 times daily as needed for muscle spasms 60 tablet 0      gabapentin (NEURONTIN) 300 MG capsule Increase by 300mg every 3 days up to 600mg three times daily as needed (Patient not taking: Reported on 9/24/2020) 90 capsule 3     hydroquinone (CRISTA) 4 % external cream Apply to face BID for 3 months then take 3 month break.       mycophenolate (GENERIC EQUIVALENT) 500 MG tablet Take 2 tablets (1,000 mg) by mouth 2 times daily 120 tablet 3     No facility-administered medications prior to visit.        PHYSICAL EXAM  Not done  Component      Latest Ref Rng & Units 11/12/2020   WBC      4.0 - 11.0 10e9/L 1.6 (L)   RBC Count      3.8 - 5.2 10e12/L 4.04   Hemoglobin      11.7 - 15.7 g/dL 11.8   Hematocrit      35.0 - 47.0 % 38.0   MCV      78 - 100 fl 94   MCH      26.5 - 33.0 pg 29.2   MCHC      31.5 - 36.5 g/dL 31.1 (L)   RDW      10.0 - 15.0 % 12.9   Platelet Count      150 - 450 10e9/L 137 (L)   Diff Method       Automated Method   % Neutrophils      % 66.5   % Lymphocytes      % 21.5   % Monocytes      % 11.4   % Eosinophils      % 0.6   % Basophils      % 0.0   % Immature Granulocytes      % 0.0   Nucleated RBCs      0 /100 0   Absolute Neutrophil      1.6 - 8.3 10e9/L 1.1 (L)   Absolute Lymphocytes      0.8 - 5.3 10e9/L 0.3 (L)   Absolute Monocytes      0.0 - 1.3 10e9/L 0.2   Absolute Eosinophils      0.0 - 0.7 10e9/L 0.0   Absolute Basophils      0.0 - 0.2 10e9/L 0.0   Abs Immature Granulocytes      0 - 0.4 10e9/L 0.0   Absolute Nucleated RBC       0.0   Color Urine       Yellow   Appearance Urine       Clear   Glucose Urine      NEG:Negative mg/dL Negative   Bilirubin Urine      NEG:Negative Negative   Ketones Urine      NEG:Negative mg/dL Negative   Specific Gravity Urine      1.003 - 1.035 1.013   Blood Urine      NEG:Negative Negative   pH Urine      5.0 - 7.0 pH 7.0   Protein Albumin Urine      NEG:Negative mg/dL Negative   Urobilinogen mg/dL      0.0 - 2.0 mg/dL 0.0   Nitrite Urine      NEG:Negative Negative   Leukocyte Esterase Urine      NEG:Negative Negative    Source       Midstream Urine   WBC Urine      0 - 5 /HPF 1   RBC Urine      0 - 2 /HPF <1   Mucous Urine      NEG:Negative /LPF Present (A)   MTB Quantiferon Result      NEG:Negative Negative   TB1 Ag minus Nil      IU/mL 0.02   TB2 Ag minus Nil      IU/mL 0.00   Mitogen minus Nil      IU/mL 3.42   Nil Result      IU/mL 0.08   Creatinine      0.52 - 1.04 mg/dL 0.44 (L)   GFR Estimate      >60 mL/min/1.73:m2 >90   GFR Estimate If Black      >60 mL/min/1.73:m2 >90   Protein Random Urine      g/L 0.26   Protein Total Urine g/gr Creatinine      0 - 0.2 g/g Cr 0.30 (H)   Hepatitis C Antibody      NR:Nonreactive Nonreactive   Hepatitis B Core Gisela      NR:Nonreactive Nonreactive   Hep B Surface Agn      NR:Nonreactive Nonreactive   T4 Free      0.76 - 1.46 ng/dL 1.18   TSH      0.40 - 4.00 mU/L 2.22   Creatinine Urine Random      mg/dL 88   Sed Rate      0 - 20 mm/h 37 (H)   DNA-ds      <10 IU/mL 27 (H)   CRP Inflammation      0.0 - 8.0 mg/L <2.9   Complement C3      81 - 157 mg/dL 74 (L)   Complement C4      13 - 39 mg/dL 3 (L)   AST      0 - 45 U/L 13   Albumin      3.4 - 5.0 g/dL 3.5   ALT      0 - 50 U/L 11   Creatinine Urine      mg/dL 88       ASSESSMENT and PLAN:    SLE with weight loss, ongoing arthralgia, active lupus serology (+anti-DNA, low C3/C4 11/2020), leukopenia (1.6 in 11/2020) but resolved chill julia lesions over toes. She is now up to 107 lbs from almost 100 lbs since 6/2020, we reduced cellcept from 3 to 2 gram a day in 6/2020 and will further taper as it might be causing weight loss. We talked about trying benlysta as only FDA approved biologic for lupus to address arthralgia and replace cellcept. After discussing risks and benefits, Yvonne agreed to try it.    Today plan of care:    Will get benlysta insurance approval    Taper cellcept from 2 tabs of 500 mg twice a day to 1 tab of 500 mg bid. Labs in one month      Continue revatio for Raynaud's    Continue plaquenil 2 tabs a day M/W/F, 1 tab a  day the rest of the week (adjuted the dose given weight loss) with annual eye exam    GI follow up for weight loss, might cancel EGD/colooscopy if continues to gain weight by tapering cellcept    RTC 3 months    Orders Placed This Encounter   Procedures     ALT     Albumin level     AST     CBC with platelets differential     Creatinine     Complement C4     Complement C3     CRP inflammation     DNA double stranded antibodies     Erythrocyte sedimentation rate auto     UA with Microscopic reflex to Culture     Protein  random urine with Creat Ratio     Creatinine random urine       Telephone visit: 27 min    Ezio Irby MD

## 2020-12-12 NOTE — RESULT ENCOUNTER NOTE
Increased anti-DNA, ESR but the rest of labs are stable. This could be from reducing cellcept dose. How are you feeling? Any flare ups? More weight loss? I expect labs to improve on benlysta. Will re-check labs on 1/4 with benlysta infusion.

## 2020-12-22 ENCOUNTER — INFUSION THERAPY VISIT (OUTPATIENT)
Dept: INFUSION THERAPY | Facility: CLINIC | Age: 25
End: 2020-12-22
Attending: INTERNAL MEDICINE
Payer: COMMERCIAL

## 2020-12-22 VITALS
SYSTOLIC BLOOD PRESSURE: 95 MMHG | TEMPERATURE: 98.5 F | DIASTOLIC BLOOD PRESSURE: 61 MMHG | HEART RATE: 96 BPM | WEIGHT: 112.3 LBS | RESPIRATION RATE: 16 BRPM | BODY MASS INDEX: 18.13 KG/M2 | OXYGEN SATURATION: 96 %

## 2020-12-22 DIAGNOSIS — M32.9 SYSTEMIC LUPUS ERYTHEMATOSUS, UNSPECIFIED SLE TYPE, UNSPECIFIED ORGAN INVOLVEMENT STATUS (H): Primary | ICD-10-CM

## 2020-12-22 PROCEDURE — 96365 THER/PROPH/DIAG IV INF INIT: CPT

## 2020-12-22 PROCEDURE — 250N000011 HC RX IP 250 OP 636: Performed by: INTERNAL MEDICINE

## 2020-12-22 PROCEDURE — 96375 TX/PRO/DX INJ NEW DRUG ADDON: CPT

## 2020-12-22 PROCEDURE — 258N000003 HC RX IP 258 OP 636: Performed by: INTERNAL MEDICINE

## 2020-12-22 PROCEDURE — 250N000013 HC RX MED GY IP 250 OP 250 PS 637: Performed by: INTERNAL MEDICINE

## 2020-12-22 RX ORDER — DIPHENHYDRAMINE HCL 25 MG
25 CAPSULE ORAL ONCE
Status: COMPLETED | OUTPATIENT
Start: 2020-12-22 | End: 2020-12-22

## 2020-12-22 RX ORDER — DIPHENHYDRAMINE HCL 25 MG
25 CAPSULE ORAL ONCE
Status: CANCELLED
Start: 2021-01-05

## 2020-12-22 RX ORDER — HEPARIN SODIUM,PORCINE 10 UNIT/ML
5 VIAL (ML) INTRAVENOUS
Status: CANCELLED | OUTPATIENT
Start: 2021-01-05

## 2020-12-22 RX ORDER — ACETAMINOPHEN 325 MG/1
650 TABLET ORAL ONCE
Status: CANCELLED
Start: 2021-01-05

## 2020-12-22 RX ORDER — METHYLPREDNISOLONE SODIUM SUCCINATE 125 MG/2ML
125 INJECTION, POWDER, LYOPHILIZED, FOR SOLUTION INTRAMUSCULAR; INTRAVENOUS ONCE
Status: CANCELLED | OUTPATIENT
Start: 2021-01-05

## 2020-12-22 RX ORDER — ACETAMINOPHEN 325 MG/1
650 TABLET ORAL ONCE
Status: COMPLETED | OUTPATIENT
Start: 2020-12-22 | End: 2020-12-22

## 2020-12-22 RX ORDER — METHYLPREDNISOLONE SODIUM SUCCINATE 125 MG/2ML
125 INJECTION, POWDER, LYOPHILIZED, FOR SOLUTION INTRAMUSCULAR; INTRAVENOUS ONCE
Status: COMPLETED | OUTPATIENT
Start: 2020-12-22 | End: 2020-12-22

## 2020-12-22 RX ORDER — HEPARIN SODIUM (PORCINE) LOCK FLUSH IV SOLN 100 UNIT/ML 100 UNIT/ML
5 SOLUTION INTRAVENOUS
Status: CANCELLED | OUTPATIENT
Start: 2021-01-05

## 2020-12-22 RX ADMIN — METHYLPREDNISOLONE SODIUM SUCCINATE 125 MG: 125 INJECTION, POWDER, FOR SOLUTION INTRAMUSCULAR; INTRAVENOUS at 09:35

## 2020-12-22 RX ADMIN — ACETAMINOPHEN 650 MG: 325 TABLET ORAL at 09:33

## 2020-12-22 RX ADMIN — DIPHENHYDRAMINE HYDROCHLORIDE 25 MG: 25 CAPSULE ORAL at 09:33

## 2020-12-22 RX ADMIN — BELIMUMAB 520 MG: 400 INJECTION, POWDER, LYOPHILIZED, FOR SOLUTION INTRAVENOUS at 10:12

## 2020-12-22 NOTE — PATIENT INSTRUCTIONS
Dear Yvonne Barron    Thank you for choosing [facility] ([facility]) for your Benlysta infusion.  The following information is a summary of our appointment as well as important reminders.      Patient Education     Belimumab solution for injection  Brand Names: Benlysta, Benlysta SC  What is this medicine?  BELIMUMAB (be LOO ue mab) is a medicine used to treat active systemic lupus erythematosus (SLE). This medicine is used with standard therapy for SLE. It is not a cure.  How should I use this medicine?  This medicine is for infusion into a vein or for injection under the skin. Infusions are given by a health care professional in a hospital or clinic setting. If you are to give your own medicine at home, you will be taught how to prepare and give this medicine under the skin. Use exactly as directed. Take your medicine at regular intervals. Do not take your medicine more often than directed.  It is important that you put your used needles and syringes in a special sharps container. Do not put them in a trash can. If you do not have a sharps container, call your pharmacist or healthcare provider to get one.  A special MedGuide will be given to you by the pharmacist with each prescription and refill. Be sure to read this information carefully each time.  Talk to your pediatrician regarding the use of this medicine in children. While this drug may be prescribed for children as young as 5 years for selected conditions, precautions do apply.  What side effects may I notice from receiving this medicine?  Side effects that you should report to your doctor or health care professional as soon as possible:    allergic reactions like skin rash, itching or hives, swelling of the face, lips, or tongue    breathing problems    chest pain    depressed mood    dizziness    feeling faint or lightheaded    signs of infection - fever or chills, cough, sore throat, pain or  difficulty passing urine    suicidal thoughts or other mood changes    trouble sleeping    unusually slow heartbeat  Side effects that usually do not require medical attention (report to your doctor or health care professional if they continue or are bothersome):    diarrhea    headache    muscle aches    nausea    pain, redness or irritation at site of injection    stuffy or runny nose  What may interact with this medicine?  Do not take this medicine with any of the following medications:    live virus vaccines  This medicine may also interact with the following medications:    cyclophosphamide    ocrelizumab    ofatumumab    rituximab  What if I miss a dose?  This medicine is used once a week if given by injection under the skin. If you miss a dose, take it as soon as you can. If it is almost time for your next dose, take only that dose. Do not take double or extra doses.  If you are to be given an infusion, it is important not to miss your dose. Call your doctor or health care professional if you are unable to keep an appointment.  Where should I keep my medicine?  Infusions will be given in a hospital or clinic and will not be stored at home.  Storage for syringes and autoinjectors stored at home:  Keep out of the reach of children. Store in a refrigerator between 2 and 8 degrees C (36 and 46 degrees F). Keep this medicine in the original container. Protect from light. Do not freeze. Do not shake. Do not use this medicine and do not place back in the refrigerator if left out at room temperature for more than 12 hours. Throw away any unused medicine after the expiration date.  What should I tell my health care provider before I take this medicine?  They need to know if you have any of these conditions:    cancer    heart disease    immune system problems    infection (especially a virus infection such as chickenpox, cold sores, or herpes)    mental illness    recently received or scheduled to receive a  vaccine    suicidal thoughts, plans, or attempt; a previous suicide attempt by you or a family member    an unusual or allergic reaction to belimumab, other medicines, foods, dyes, or preservatives    pregnant or trying to get pregnant    breast-feeding  What should I watch for while using this medicine?  Tell your doctor or healthcare professional if your symptoms do not start to get better or if they get worse.  In some patients, this medicine may cause a serious brain infection that may cause death. If you have any problems seeing, thinking, speaking, walking, or standing, tell your doctor right away. If you cannot reach your doctor, urgently seek other source of medical care.  Talk to your doctor about your risk of cancer. You may be more at risk for certain types of cancers if you take this medicine.  NOTE:This sheet is a summary. It may not cover all possible information. If you have questions about this medicine, talk to your doctor, pharmacist, or health care provider. Copyright  2020 [employer]    EDUCATION POST BIOLOGICAL/CHEMOTHERAPY INFUSION  Call the triage nurse at your clinic or seek medical attention if you have chills and/or temperature greater than or equal to 100.5, uncontrolled nausea/vomiting, diarrhea, constipation, dizziness, shortness of breath, chest pain, heart palpitations, weakness or any other new or concerning symptoms, questions or concerns.  You can not have any live virus vaccines prior to or during treatment or up to 6 months post infusion.  If you have an upcoming surgery, medical procedure or dental procedure during treatment, this should be discussed with your ordering physician and your surgeon/dentist.  If you are having any concerning symptom, if you are unsure if you should get your next infusion or wish to speak to a provider before your next infusion, please call your care coordinator or triage nurse at your clinic to notify them so we can adequately serve you.         We  look forward in seeing you on your next appointment here at [facility] ([facility]).  Please don t hesitate to call us at [phone] to reschedule any of your appointments or to speak with one of the [facility] registered nurses.  It was a pleasure taking care of you today.    Sincerely,    [facility]  [facility]  [address] [place] [address]  [id number]  Phone:  [phone]

## 2020-12-22 NOTE — PROGRESS NOTES
Nursing Note  Yvonne Barron [facility] for:   Chief Complaint   Patient presents with     Infusion     Benlysta     During [facility]'s [facility] appointment, orders from Dr. Irby were completed.  Frequency: weeks 0, 2, 4, then every four weeks. Today is patient's second dose.    Progress note:  Patient identification verified by name and date of birth.  Assessment completed.  Vitals recorded in Doc Flowsheets.  Patient was provided with education regarding medication/procedure and possible side effects.  Patient verbalized understanding.     present during visit today: Not Applicable.    Treatment Conditions: ~~~ NOTE: If the patient answers yes to any of the questions below, hold the infusion and contact ordering provider or on-call provider.    1. Have you recently had an elevated temperature, fever, chills, productive cough, coughing for 3 weeks or longer or hemoptysis, abnormal vital signs, night sweats,  chest pain or have you noticed a decrease in your appetite, unexplained weight loss or fatigue? No  2. Do you have any open wounds or new incisions? No  3. Do you have any recent or upcoming hospitalizations, surgeries or dental procedures? No  4. Do you currently have or recently have had any signs of illness or infection or are you on any antibiotics? No  5. Have you had any new, sudden or worsening abdominal pain? No  6. Have you or anyone in your household received a live vaccination in the past 4 weeks? Please note:  No live vaccines while on biologic/chemotherapy until 6 months after the last treatment.  Patient can receive the flu vaccine (shot only) and the pneumovax.  It is optimal for the patient to get these vaccines mid cycle, but they can be given at any time as long as it is not on the day of the infusion. No  7. Have you recently been diagnosed with any new nervous system diseases (ie. Multiple sclerosis,  Guillain [place], seizures, neurological changes) or cancer diagnosis? No  8. Are you on any form of radiation or chemotherapy? No  9. Are you pregnant or breast feeding or do you have plans of pregnancy in the future? No  10. Have you been having any signs of worsening depression or suicidal ideations?  (benlysta only) No  11. Have there been any other new onset medical symptoms? No      Premedications: administered per order.    Drug Waste Record: No    Infusion length and rate: Benlysta infusion given over approximately 60 minutes at 282 ml/hr.    Labs: were not ordered for this appointment.    Vascular access: peripheral IV placed today.    Post Infusion Assessment:  Patient tolerated infusion without incident.     Discharge Plan:   Follow up plan of care with: ongoing infusions at [facility] ordering provider as scheduled. and after visit summary given to patient  Discharge instructions were reviewed with patient.  Patient/representative verbalized understanding of discharge instructions and all questions answered.  Patient discharged from [facility] in stable condition.    Ramila Gonzales RN       Administrations This Visit     acetaminophen (TYLENOL) tablet 650 mg     Admin Date  12/22/2020 Action  Given Dose  650 mg Route  Oral Administered By  Ramila Gonzales RN          belimumab (BENLYSTA) 520 mg in sodium chloride 0.9 % 282 mL INFUSION     Admin Date  12/22/2020 Action  New Bag Dose  520 mg Rate  282 mL/hr Route  Intravenous Administered By  Ramila Gonzales RN          diphenhydrAMINE (BENADRYL) capsule 25 mg     Admin Date  12/22/2020 Action  Given Dose  25 mg Route  Oral Administered By  Ramila Gonzlaes RN          methylPREDNISolone sodium succinate (solu-MEDROL) injection 125 mg     Admin Date  12/22/2020 Action  Given Dose  125 mg Route  Intravenous Administered By  Ramila Gonzales RN                BP 95/61   Pulse 96   Temp 98.5   F (36.9  C) (Oral)   Resp 16   Wt 50.9 kg (112 lb 4.8 oz)   SpO2 96%   BMI 18.13 kg/m

## 2020-12-22 NOTE — LETTER
12/22/2020         RE: Yvonne Barron  [address]  [place] [address]        Dear Colleague,    Thank you for referring your patient, Yvonne Barron, to the [facility]. Please see a copy of my visit note below.    Nursing Note  Yvonne Barron [facility] for:   Chief Complaint   Patient presents with     Infusion     Benlysta     During [facility]'s [facility] appointment, orders from Dr. Irby were completed.  Frequency: weeks 0, 2, 4, then every four weeks. Today is patient's second dose.    Progress note:  Patient identification verified by name and date of birth.  Assessment completed.  Vitals recorded in Doc Flowsheets.  Patient was provided with education regarding medication/procedure and possible side effects.  Patient verbalized understanding.     present during visit today: Not Applicable.    Treatment Conditions: ~~~ NOTE: If the patient answers yes to any of the questions below, hold the infusion and contact ordering provider or on-call provider.    1. Have you recently had an elevated temperature, fever, chills, productive cough, coughing for 3 weeks or longer or hemoptysis, abnormal vital signs, night sweats,  chest pain or have you noticed a decrease in your appetite, unexplained weight loss or fatigue? No  2. Do you have any open wounds or new incisions? No  3. Do you have any recent or upcoming hospitalizations, surgeries or dental procedures? No  4. Do you currently have or recently have had any signs of illness or infection or are you on any antibiotics? No  5. Have you had any new, sudden or worsening abdominal pain? No  6. Have you or anyone in your household received a live vaccination in the past 4 weeks? Please note:  No live vaccines while on biologic/chemotherapy until 6 months after the last treatment.  Patient can receive the  flu vaccine (shot only) and the pneumovax.  It is optimal for the patient to get these vaccines mid cycle, but they can be given at any time as long as it is not on the day of the infusion. No  7. Have you recently been diagnosed with any new nervous system diseases (ie. Multiple sclerosis, Guillain [place], seizures, neurological changes) or cancer diagnosis? No  8. Are you on any form of radiation or chemotherapy? No  9. Are you pregnant or breast feeding or do you have plans of pregnancy in the future? No  10. Have you been having any signs of worsening depression or suicidal ideations?  (benlysta only) No  11. Have there been any other new onset medical symptoms? No      Premedications: administered per order.    Drug Waste Record: No    Infusion length and rate: Benlysta infusion given over approximately 60 minutes at 282 ml/hr.    Labs: were not ordered for this appointment.    Vascular access: peripheral IV placed today.    Post Infusion Assessment:  Patient tolerated infusion without incident.     Discharge Plan:   Follow up plan of care with: ongoing infusions at [facility]., ordering provider as scheduled. and after visit summary given to patient  Discharge instructions were reviewed with patient.  Patient/representative verbalized understanding of discharge instructions and all questions answered.  Patient discharged from [facility] in stable condition.    Ramila Gonzales RN       Administrations This Visit     acetaminophen (TYLENOL) tablet 650 mg     Admin Date  12/22/2020 Action  Given Dose  650 mg Route  Oral Administered By  Ramila Gonzales RN          belimumab (BENLYSTA) 520 mg in sodium chloride 0.9 % 282 mL INFUSION     Admin Date  12/22/2020 Action  New Bag Dose  520 mg Rate  282 mL/hr Route  Intravenous Administered By  Ramila Gonzales RN          diphenhydrAMINE (BENADRYL) capsule 25 mg     Admin Date  12/22/2020 Action  Given  Dose  25 mg Route  Oral Administered By  Ramila Gonzales, RN          methylPREDNISolone sodium succinate (solu-MEDROL) injection 125 mg     Admin Date  12/22/2020 Action  Given Dose  125 mg Route  Intravenous Administered By  Ramila Gonzales, RN                BP 95/61   Pulse 96   Temp 98.5  F (36.9  C) (Oral)   Resp 16   Wt 50.9 kg (112 lb 4.8 oz)   SpO2 96%   BMI 18.13 kg/m          Again, thank you for allowing me to participate in the care of your patient.        Sincerely,        [facility]

## 2020-12-23 DIAGNOSIS — M32.9 SYSTEMIC LUPUS ERYTHEMATOSUS, UNSPECIFIED SLE TYPE, UNSPECIFIED ORGAN INVOLVEMENT STATUS (H): ICD-10-CM

## 2020-12-23 DIAGNOSIS — L81.9 HYPERPIGMENTATION: ICD-10-CM

## 2020-12-23 NOTE — LETTER
{Plaquenil Refill/newstart:125920}  Eye Specialist Letter  Dear Eye Specialist,  To ensure safe use of Plaquenil (hydroxychloroquine), we are requesting your assistance in providing the following information. Please return this form to our clinic via mail or fax, or incorporate this information into your visit summary. Your note must indicate whether or not there is evidence of toxicity from Plaquenil use. For questions regarding this form, please call [phone].  Patient Name:   :             Date of Exam:    The following exams were completed during this visit in accordance with the American Academy of Ophthalmology recommendations (2016):  ? 10-2 automated visual field  ? Spectral-domain optical coherence tomography (SD OCT)  ? Multifocal electroretinogram (mfERG)  ? Fundus autofluorescence (FAF)  ? Other (please specify)  Please select from the following:  ? The findings from the above exams are not suggestive of toxicity from Plaquenil (hydroxychloroquine).  ? The findings from the above exams may suggest toxicity from Plaquenil (hydroxychloroquine).  Directly contact the clinic and fax this form.  Please provide additional guidance on whether or not the medication may be continued from your perspective:  Date of next recommended Plaquenil (hydroxychloroquine) screening eye exam:   ? 5 years  ? 1 year  ? 6 months  Other (please specify):   Eye Specialist Name (print):                                                                Date:  Eye Specialist Signature:

## 2020-12-24 ENCOUNTER — TELEPHONE (OUTPATIENT)
Dept: RHEUMATOLOGY | Facility: CLINIC | Age: 25
End: 2020-12-24

## 2020-12-24 NOTE — TELEPHONE ENCOUNTER
Patient is calling to see if she should keep the appt for the colonoscopy or should she cancel it.

## 2020-12-24 NOTE — TELEPHONE ENCOUNTER
Called to speak with pt, left message requesting return call on Monday.    Manisha Michael RN  Rhe[facility]ology Clinic

## 2020-12-27 RX ORDER — HYDROXYCHLOROQUINE SULFATE 200 MG/1
TABLET, FILM COATED ORAL
Qty: 120 TABLET | Refills: 0 | Status: SHIPPED | OUTPATIENT
Start: 2020-12-27 | End: 2021-06-21

## 2020-12-27 RX ORDER — TRETINOIN 0.25 MG/G
CREAM TOPICAL
Qty: 45 G | Refills: 1 | Status: SHIPPED | OUTPATIENT
Start: 2020-12-27 | End: 2021-04-19

## 2020-12-27 NOTE — TELEPHONE ENCOUNTER
tretinoin (RETIN-A) 0.025 % external cream   Last Written Prescription Date:  9/24/20  Last Fill Quantity: 45g   # refills: 1  Last Office Visit :9/24/20  Future Office visit:  None    Process 1     9/4/20      Return in about 6 months (around 3/24/2021).

## 2020-12-27 NOTE — TELEPHONE ENCOUNTER
hydroxychloroquine (PLAQUENIL) 200 MG tablet  Last Written Prescription Date: 9/17/20  Last Fill Quantity: 135,   # refills:0  Last Office Visit : 11/13/20  Future Office visit:  None    Eye exam not found.  letter sent    Routing refill request to provider for review/approval because: eye exam not  found.

## 2020-12-29 NOTE — TELEPHONE ENCOUNTER
Caller:PATIENT    Reason for cancel? NO LONGER NEEDED CX-1/5 VISKOCIL    Rescheduled? N    Will patient call back to reschedule?N

## 2020-12-30 NOTE — TELEPHONE ENCOUNTER
Called and spoke with pt, she did cancel appt.  She decided that the colonoscopy is not necessary as she has put on weight and is doing better and so she has decided to wait.  She is in agreement that if she starts losing weight again despite being on benlysta, that she will reschedule the colonoscopy at that time.    Will update Dr. Irby.    Manisha Michael RN  Rheumatology Clinic

## 2021-01-03 ENCOUNTER — HEALTH MAINTENANCE LETTER (OUTPATIENT)
Age: 26
End: 2021-01-03

## 2021-01-04 ENCOUNTER — INFUSION THERAPY VISIT (OUTPATIENT)
Dept: INFUSION THERAPY | Facility: CLINIC | Age: 26
End: 2021-01-04
Attending: INTERNAL MEDICINE
Payer: COMMERCIAL

## 2021-01-04 VITALS
BODY MASS INDEX: 18.43 KG/M2 | TEMPERATURE: 98.2 F | WEIGHT: 114.2 LBS | DIASTOLIC BLOOD PRESSURE: 60 MMHG | HEART RATE: 82 BPM | SYSTOLIC BLOOD PRESSURE: 90 MMHG

## 2021-01-04 DIAGNOSIS — M32.9 SYSTEMIC LUPUS ERYTHEMATOSUS, UNSPECIFIED SLE TYPE, UNSPECIFIED ORGAN INVOLVEMENT STATUS (H): Primary | ICD-10-CM

## 2021-01-04 DIAGNOSIS — Z51.81 MEDICATION MONITORING ENCOUNTER: ICD-10-CM

## 2021-01-04 LAB
ALBUMIN SERPL-MCNC: 3.2 G/DL (ref 3.4–5)
ALT SERPL W P-5'-P-CCNC: 12 U/L (ref 0–50)
AST SERPL W P-5'-P-CCNC: 14 U/L (ref 0–45)
BASOPHILS # BLD AUTO: 0 10E9/L (ref 0–0.2)
BASOPHILS NFR BLD AUTO: 0 %
CREAT SERPL-MCNC: 0.44 MG/DL (ref 0.52–1.04)
CRP SERPL-MCNC: <2.9 MG/L (ref 0–8)
DIFFERENTIAL METHOD BLD: ABNORMAL
EOSINOPHIL # BLD AUTO: 0 10E9/L (ref 0–0.7)
EOSINOPHIL NFR BLD AUTO: 0.6 %
ERYTHROCYTE [DISTWIDTH] IN BLOOD BY AUTOMATED COUNT: 13 % (ref 10–15)
ERYTHROCYTE [SEDIMENTATION RATE] IN BLOOD BY WESTERGREN METHOD: 46 MM/H (ref 0–20)
GFR SERPL CREATININE-BSD FRML MDRD: >90 ML/MIN/{1.73_M2}
HCT VFR BLD AUTO: 35.3 % (ref 35–47)
HGB BLD-MCNC: 10.9 G/DL (ref 11.7–15.7)
IMM GRANULOCYTES # BLD: 0 10E9/L (ref 0–0.4)
IMM GRANULOCYTES NFR BLD: 0 %
LYMPHOCYTES # BLD AUTO: 0.4 10E9/L (ref 0.8–5.3)
LYMPHOCYTES NFR BLD AUTO: 24.8 %
MCH RBC QN AUTO: 28.1 PG (ref 26.5–33)
MCHC RBC AUTO-ENTMCNC: 30.9 G/DL (ref 31.5–36.5)
MCV RBC AUTO: 91 FL (ref 78–100)
MONOCYTES # BLD AUTO: 0.1 10E9/L (ref 0–1.3)
MONOCYTES NFR BLD AUTO: 8.1 %
NEUTROPHILS # BLD AUTO: 1.1 10E9/L (ref 1.6–8.3)
NEUTROPHILS NFR BLD AUTO: 66.5 %
NRBC # BLD AUTO: 0 10*3/UL
NRBC BLD AUTO-RTO: 0 /100
PLATELET # BLD AUTO: 165 10E9/L (ref 150–450)
RBC # BLD AUTO: 3.88 10E12/L (ref 3.8–5.2)
WBC # BLD AUTO: 1.6 10E9/L (ref 4–11)

## 2021-01-04 PROCEDURE — 86160 COMPLEMENT ANTIGEN: CPT | Performed by: INTERNAL MEDICINE

## 2021-01-04 PROCEDURE — 86225 DNA ANTIBODY NATIVE: CPT | Performed by: INTERNAL MEDICINE

## 2021-01-04 PROCEDURE — 86140 C-REACTIVE PROTEIN: CPT | Performed by: INTERNAL MEDICINE

## 2021-01-04 PROCEDURE — 250N000011 HC RX IP 250 OP 636: Performed by: INTERNAL MEDICINE

## 2021-01-04 PROCEDURE — 82565 ASSAY OF CREATININE: CPT | Performed by: INTERNAL MEDICINE

## 2021-01-04 PROCEDURE — 258N000003 HC RX IP 258 OP 636: Performed by: INTERNAL MEDICINE

## 2021-01-04 PROCEDURE — 84450 TRANSFERASE (AST) (SGOT): CPT | Performed by: INTERNAL MEDICINE

## 2021-01-04 PROCEDURE — 96365 THER/PROPH/DIAG IV INF INIT: CPT

## 2021-01-04 PROCEDURE — 85652 RBC SED RATE AUTOMATED: CPT | Performed by: INTERNAL MEDICINE

## 2021-01-04 PROCEDURE — 250N000013 HC RX MED GY IP 250 OP 250 PS 637: Performed by: INTERNAL MEDICINE

## 2021-01-04 PROCEDURE — 82040 ASSAY OF SERUM ALBUMIN: CPT | Performed by: INTERNAL MEDICINE

## 2021-01-04 PROCEDURE — 84460 ALANINE AMINO (ALT) (SGPT): CPT | Performed by: INTERNAL MEDICINE

## 2021-01-04 PROCEDURE — 36415 COLL VENOUS BLD VENIPUNCTURE: CPT

## 2021-01-04 PROCEDURE — 96375 TX/PRO/DX INJ NEW DRUG ADDON: CPT

## 2021-01-04 PROCEDURE — 85025 COMPLETE CBC W/AUTO DIFF WBC: CPT | Performed by: INTERNAL MEDICINE

## 2021-01-04 RX ORDER — METHYLPREDNISOLONE SODIUM SUCCINATE 125 MG/2ML
125 INJECTION, POWDER, LYOPHILIZED, FOR SOLUTION INTRAMUSCULAR; INTRAVENOUS ONCE
Status: CANCELLED | OUTPATIENT
Start: 2021-01-05

## 2021-01-04 RX ORDER — DIPHENHYDRAMINE HCL 25 MG
25 CAPSULE ORAL ONCE
Status: COMPLETED | OUTPATIENT
Start: 2021-01-04 | End: 2021-01-04

## 2021-01-04 RX ORDER — HEPARIN SODIUM,PORCINE 10 UNIT/ML
5 VIAL (ML) INTRAVENOUS
Status: CANCELLED | OUTPATIENT
Start: 2021-01-05

## 2021-01-04 RX ORDER — ACETAMINOPHEN 325 MG/1
650 TABLET ORAL ONCE
Status: CANCELLED
Start: 2021-01-05

## 2021-01-04 RX ORDER — DIPHENHYDRAMINE HCL 25 MG
25 CAPSULE ORAL ONCE
Status: CANCELLED
Start: 2021-01-05

## 2021-01-04 RX ORDER — ACETAMINOPHEN 325 MG/1
650 TABLET ORAL ONCE
Status: COMPLETED | OUTPATIENT
Start: 2021-01-04 | End: 2021-01-04

## 2021-01-04 RX ORDER — HEPARIN SODIUM (PORCINE) LOCK FLUSH IV SOLN 100 UNIT/ML 100 UNIT/ML
5 SOLUTION INTRAVENOUS
Status: CANCELLED | OUTPATIENT
Start: 2021-01-05

## 2021-01-04 RX ORDER — METHYLPREDNISOLONE SODIUM SUCCINATE 125 MG/2ML
125 INJECTION, POWDER, LYOPHILIZED, FOR SOLUTION INTRAMUSCULAR; INTRAVENOUS ONCE
Status: COMPLETED | OUTPATIENT
Start: 2021-01-04 | End: 2021-01-04

## 2021-01-04 RX ADMIN — BELIMUMAB 520 MG: 400 INJECTION, POWDER, LYOPHILIZED, FOR SOLUTION INTRAVENOUS at 10:23

## 2021-01-04 RX ADMIN — DIPHENHYDRAMINE HYDROCHLORIDE 25 MG: 25 CAPSULE ORAL at 09:43

## 2021-01-04 RX ADMIN — METHYLPREDNISOLONE SODIUM SUCCINATE 125 MG: 125 INJECTION, POWDER, FOR SOLUTION INTRAMUSCULAR; INTRAVENOUS at 10:03

## 2021-01-04 RX ADMIN — ACETAMINOPHEN 650 MG: 325 TABLET ORAL at 09:43

## 2021-01-04 ASSESSMENT — PAIN SCALES - GENERAL: PAINLEVEL: NO PAIN (0)

## 2021-01-04 NOTE — LETTER
January 27, 2021      Yvonne Barron  [address]  [place] [address]        Dear ,    We are writing to inform you of your test results.    Overall stable labs. Recommend repeat labs in 3/2021 (ordered). Also, please make a follow up appointment with me.    Resulted Orders   Erythrocyte sedimentation rate auto   Result Value Ref Range    Sed Rate 46 (H) 0 - 20 mm/h   DNA double stranded antibodies   Result Value Ref Range    DNA-ds 145 (H) <10 IU/mL      Comment:      Positive   CRP inflammation   Result Value Ref Range    CRP Inflammation <2.9 0.0 - 8.0 mg/L   Complement C3   Result Value Ref Range    Complement C3 72 (L) 81 - 157 mg/dL   Complement C4   Result Value Ref Range    Complement C4 3 (L) 13 - 39 mg/dL   Creatinine   Result Value Ref Range    Creatinine 0.44 (L) 0.52 - 1.04 mg/dL    GFR Estimate >90 >60 mL/min/[1.73_m2]      Comment:      Non  GFR Calc  Starting 12/18/2018, serum creatinine based estimated GFR (eGFR) will be   calculated using the Chronic Kidney Disease Epidemiology Collaboration   (CKD-EPI) equation.      GFR Estimate If Black >90 >60 mL/min/[1.73_m2]      Comment:       GFR Calc  Starting 12/18/2018, serum creatinine based estimated GFR (eGFR) will be   calculated using the Chronic Kidney Disease Epidemiology Collaboration   (CKD-EPI) equation.     CBC with platelets differential   Result Value Ref Range    WBC 1.6 (L) 4.0 - 11.0 10e9/L    RBC Count 3.88 3.8 - 5.2 10e12/L    Hemoglobin 10.9 (L) 11.7 - 15.7 g/dL    Hematocrit 35.3 35.0 - 47.0 %    MCV 91 78 - 100 fl    MCH 28.1 26.5 - 33.0 pg    MCHC 30.9 (L) 31.5 - 36.5 g/dL    RDW 13.0 10.0 - 15.0 %    Platelet Count 165 150 - 450 10e9/L    Diff Method Automated Method     % Neutrophils 66.5 %    % Lymphocytes 24.8 %    % Monocytes 8.1 %    % Eosinophils 0.6 %    % Basophils 0.0 %    % Immature Granulocytes 0.0 %    Nucleated RBCs 0 0 /100    Absolute Neutrophil 1.1  (L) 1.6 - 8.3 10e9/L    Absolute Lymphocytes 0.4 (L) 0.8 - 5.3 10e9/L    Absolute Monocytes 0.1 0.0 - 1.3 10e9/L    Absolute Eosinophils 0.0 0.0 - 0.7 10e9/L    Absolute Basophils 0.0 0.0 - 0.2 10e9/L    Abs Immature Granulocytes 0.0 0 - 0.4 10e9/L    Absolute Nucleated RBC 0.0    AST   Result Value Ref Range    AST 14 0 - 45 U/L   Albumin level   Result Value Ref Range    Albumin 3.2 (L) 3.4 - 5.0 g/dL   ALT   Result Value Ref Range    ALT 12 0 - 50 U/L       If you have any questions or concerns, please call the clinic at the number listed above.       Sincerely,      Ezio Irby MD

## 2021-01-04 NOTE — LETTER
January 27, 2021      Yvonne Barron  [address]  [place] [address]        Dear ,    We are writing to inform you of your test results.    Overall stable labs. Recommend repeat labs in 3/2021 (ordered). Also, please make a follow up appointment with me.    Resulted Orders   Erythrocyte sedimentation rate auto   Result Value Ref Range    Sed Rate 46 (H) 0 - 20 mm/h   DNA double stranded antibodies   Result Value Ref Range    DNA-ds 145 (H) <10 IU/mL      Comment:      Positive   CRP inflammation   Result Value Ref Range    CRP Inflammation <2.9 0.0 - 8.0 mg/L   Complement C3   Result Value Ref Range    Complement C3 72 (L) 81 - 157 mg/dL   Complement C4   Result Value Ref Range    Complement C4 3 (L) 13 - 39 mg/dL   Creatinine   Result Value Ref Range    Creatinine 0.44 (L) 0.52 - 1.04 mg/dL    GFR Estimate >90 >60 mL/min/[1.73_m2]      Comment:      Non  GFR Calc  Starting 12/18/2018, serum creatinine based estimated GFR (eGFR) will be   calculated using the Chronic Kidney Disease Epidemiology Collaboration   (CKD-EPI) equation.      GFR Estimate If Black >90 >60 mL/min/[1.73_m2]      Comment:       GFR Calc  Starting 12/18/2018, serum creatinine based estimated GFR (eGFR) will be   calculated using the Chronic Kidney Disease Epidemiology Collaboration   (CKD-EPI) equation.     CBC with platelets differential   Result Value Ref Range    WBC 1.6 (L) 4.0 - 11.0 10e9/L    RBC Count 3.88 3.8 - 5.2 10e12/L    Hemoglobin 10.9 (L) 11.7 - 15.7 g/dL    Hematocrit 35.3 35.0 - 47.0 %    MCV 91 78 - 100 fl    MCH 28.1 26.5 - 33.0 pg    MCHC 30.9 (L) 31.5 - 36.5 g/dL    RDW 13.0 10.0 - 15.0 %    Platelet Count 165 150 - 450 10e9/L    Diff Method Automated Method     % Neutrophils 66.5 %    % Lymphocytes 24.8 %    % Monocytes 8.1 %    % Eosinophils 0.6 %    % Basophils 0.0 %    % Immature Granulocytes 0.0 %    Nucleated RBCs 0 0 /100    Absolute Neutrophil 1.1  (L) 1.6 - 8.3 10e9/L    Absolute Lymphocytes 0.4 (L) 0.8 - 5.3 10e9/L    Absolute Monocytes 0.1 0.0 - 1.3 10e9/L    Absolute Eosinophils 0.0 0.0 - 0.7 10e9/L    Absolute Basophils 0.0 0.0 - 0.2 10e9/L    Abs Immature Granulocytes 0.0 0 - 0.4 10e9/L    Absolute Nucleated RBC 0.0    AST   Result Value Ref Range    AST 14 0 - 45 U/L   Albumin level   Result Value Ref Range    Albumin 3.2 (L) 3.4 - 5.0 g/dL   ALT   Result Value Ref Range    ALT 12 0 - 50 U/L       If you have any questions or concerns, please call the clinic at the number listed above.       Sincerely,      Ezio Irby MD

## 2021-01-04 NOTE — PROGRESS NOTES
Nursing Note  Yvonne Barron presents today to [facility] for:   Chief Complaint   Patient presents with     Infusion     benlysta     During today's [facility] appointment, orders from Dr. Irby were completed.  Frequency: This is week 4.  After this, infusions shall be q 4 weeks    Progress note:  Patient identification verified by name and date of birth.  Assessment completed.  Vitals recorded in Doc Flowsheets.  Patient was provided with education regarding medication/procedure and possible side effects.  Patient verbalized understanding.     present during visit today: Not Applicable.    Treatment Conditions: ~~~ NOTE: If the patient answers yes to any of the questions below, hold the infusion and contact ordering provider or on-call provider.    1. Have you recently had an elevated temperature, fever, chills, productive cough, coughing for 3 weeks or longer or hemoptysis, abnormal vital signs, night sweats,  chest pain or have you noticed a decrease in your appetite, unexplained weight loss or fatigue? No  2. Do you have any open wounds or new incisions? No  3. Do you have any recent or upcoming hospitalizations, surgeries or dental procedures? No  4. Do you currently have or recently have had any signs of illness or infection or are you on any antibiotics? No  5. Have you had any new, sudden or worsening abdominal pain? No  6. Have you or anyone in your household received a live vaccination in the past 4 weeks? Please note:  No live vaccines while on biologic/chemotherapy until 6 months after the last treatment.  Patient can receive the flu vaccine (shot only) and the pneumovax.  It is optimal for the patient to get these vaccines mid cycle, but they can be given at any time as long as it is not on the day of the infusion. No  7. Have you recently been diagnosed with any new nervous system diseases (ie. Multiple sclerosis, Guillain  [place], seizures, neurological changes) or cancer diagnosis? No  8. Are you on any form of radiation or chemotherapy? No  9. Are you pregnant or breast feeding or do you have plans of pregnancy in the future? No  10. Have you been having any signs of worsening depression or suicidal ideations?  (benlysta only) No  11. Have there been any other new onset medical symptoms? No      Premedications: administered per order.    Drug Waste Record: No    Benlysta Infusion length and rate:  infusion given over approximately 60 minutes    Labs: were drawn per orders. Patient was NOT able to produce urine specimen today.    Vascular access: peripheral IV placed today.    Is the next appt scheduled? 2/1/21    Post Infusion Assessment:  Patient tolerated infusion without incident.  Blood return noted pre and post infusion.  Site patent and intact, free from redness, edema or discomfort.  No evidence of extravasations.  Access discontinued per protocol.  Biologic Infusion Post Education: Call the triage nurse at your clinic or seek medical attention if you have chills and/or temperature greater than or equal to 100.5, uncontrolled nausea/vomiting, diarrhea, constipation, dizziness, shortness of breath, chest pain, heart palpitations, weakness or any other new or concerning symptoms, questions or concerns.  You cannot have any live virus vaccines prior to or during treatment or up to 6 months post infusion.  If you have an upcoming surgery, medical procedure or dental procedure during treatment, this should be discussed with your ordering physician and your surgeon/dentist.  If you are having any concerning symptom, if you are unsure if you should get your next infusion or wish to speak to a provider before your next infusion, please call your care coordinator or triage nurse at your clinic to notify them so we can adequately serve you.     Discharge Plan:   Follow up plan of care with: ongoing infusions at [facility]., ordering provider as scheduled. and after visit summary given to patient  Discharge instructions were reviewed with patient.  Patient/representative verbalized understanding of discharge instructions and all questions answered.  Patient discharged from [facility] in stable condition.    Margarita Carrasco    Administrations This Visit     acetaminophen (TYLENOL) tablet 650 mg     Admin Date  01/04/2021 Action  Given Dose  650 mg Route  Oral Administered By  Margarita Carrasco          diphenhydrAMINE (BENADRYL) capsule 25 mg     Admin Date  01/04/2021 Action  Given Dose  25 mg Route  Oral Administered By  Margarita Carrasco          methylPREDNISolone sodium succinate (solu-MEDROL) injection 125 mg     Admin Date  01/04/2021 Action  Given Dose  125 mg Route  Intravenous Administered By  Margarita Carrasco                /66   Pulse 96   Temp 98.2  F (36.8  C)   Wt 51.8 kg (114 lb 3.2 oz)   PF 99 L/min   BMI 18.43 kg/m

## 2021-01-04 NOTE — LETTER
1/4/2021         RE: Yvonne Barron  [address]  [place] [address]        Dear Colleague,    Thank you for referring your patient, Yvonne Barron, to the [facility]. Please see a copy of my visit note below.    Nursing Note  Yvonne Barron presents today to [facility] for:   Chief Complaint   Patient presents with     Infusion     benlysta     During [facility]'s [facility] appointment, orders from Dr. Irby were completed.  Frequency: This is week 4.  After this, infusions shall be q 4 weeks    Progress note:  Patient identification verified by name and date of birth.  Assessment completed.  Vitals recorded in Doc Flowsheets.  Patient was provided with education regarding medication/procedure and possible side effects.  Patient verbalized understanding.     present during visit today: Not Applicable.    Treatment Conditions: ~~~ NOTE: If the patient answers yes to any of the questions below, hold the infusion and contact ordering provider or on-call provider.    1. Have you recently had an elevated temperature, fever, chills, productive cough, coughing for 3 weeks or longer or hemoptysis, abnormal vital signs, night sweats,  chest pain or have you noticed a decrease in your appetite, unexplained weight loss or fatigue? No  2. Do you have any open wounds or new incisions? No  3. Do you have any recent or upcoming hospitalizations, surgeries or dental procedures? No  4. Do you currently have or recently have had any signs of illness or infection or are you on any antibiotics? No  5. Have you had any new, sudden or worsening abdominal pain? No  6. Have you or anyone in your household received a live vaccination in the past 4 weeks? Please note:  No live vaccines while on biologic/chemotherapy until 6 months after the last treatment.  Patient can receive the flu vaccine  (shot only) and the pneumovax.  It is optimal for the patient to get these vaccines mid cycle, but they can be given at any time as long as it is not on the day of the infusion. No  7. Have you recently been diagnosed with any new nervous system diseases (ie. Multiple sclerosis, Guillain [place], seizures, neurological changes) or cancer diagnosis? No  8. Are you on any form of radiation or chemotherapy? No  9. Are you pregnant or breast feeding or do you have plans of pregnancy in the future? No  10. Have you been having any signs of worsening depression or suicidal ideations?  (benlysta only) No  11. Have there been any other new onset medical symptoms? No      Premedications: administered per order.    Drug Waste Record: No    Benlysta Infusion length and rate:  infusion given over approximately 60 minutes    Labs: were drawn per orders.     Vascular access: peripheral IV placed today.    Is the next appt scheduled? 2/1/21    Post Infusion Assessment:  Patient tolerated infusion without incident.  Blood return noted pre and post infusion.  Site patent and intact, free from redness, edema or discomfort.  No evidence of extravasations.  Access discontinued per protocol.  Biologic Infusion Post Education: Call the triage nurse at your clinic or seek medical attention if you have chills and/or temperature greater than or equal to 100.5, uncontrolled nausea/vomiting, diarrhea, constipation, dizziness, shortness of breath, chest pain, heart palpitations, weakness or any other new or concerning symptoms, questions or concerns.  You cannot have any live virus vaccines prior to or during treatment or up to 6 months post infusion.  If you have an upcoming surgery, medical procedure or dental procedure during treatment, this should be discussed with your ordering physician and your surgeon/dentist.  If you are having any concerning symptom, if you are unsure if you should get your next infusion or wish to speak to a provider  before your next infusion, please call your care coordinator or triage nurse at your clinic to notify them so we can adequately serve you.     Discharge Plan:   Follow up plan of care with: ongoing infusions at [facility]., ordering provider as scheduled. and after visit summary given to patient  Discharge instructions were reviewed with patient.  Patient/representative verbalized understanding of discharge instructions and all questions answered.  Patient discharged from [facility] in stable condition.    Margarita Carrasco    Administrations This Visit     acetaminophen (TYLENOL) tablet 650 mg     Admin Date  01/04/2021 Action  Given Dose  650 mg Route  Oral Administered By  Margarita Carrasco          diphenhydrAMINE (BENADRYL) capsule 25 mg     Admin Date  01/04/2021 Action  Given Dose  25 mg Route  Oral Administered By  Margarita Carrasco          methylPREDNISolone sodium succinate (solu-MEDROL) injection 125 mg     Admin Date  01/04/2021 Action  Given Dose  125 mg Route  Intravenous Administered By  Margarita Carrasco                /66   Pulse 96   Temp 98.2  F (36.8  C)   Wt 51.8 kg (114 lb 3.2 oz)   PF 99 L/min   BMI 18.43 kg/m              Again, thank you for allowing me to participate in the care of your patient.        Sincerely,        [facility]

## 2021-01-05 LAB
C3 SERPL-MCNC: 72 MG/DL (ref 81–157)
C4 SERPL-MCNC: 3 MG/DL (ref 13–39)
DSDNA AB SER-ACNC: 145 IU/ML

## 2021-01-14 ENCOUNTER — TELEPHONE (OUTPATIENT)
Dept: RHEUMATOLOGY | Facility: CLINIC | Age: 26
End: 2021-01-14

## 2021-01-14 NOTE — TELEPHONE ENCOUNTER
----- Message from Ethan Phoenix sent at 1/14/2021  1:04 PM CST -----  Regarding: SLEDAI  Hi Manisha/Dr. Irby,    As discussed, patient's insurance is asking for SLEDAI/Global Assessment Score for coverage of Benlysta to continue. Would you be able to assess and place information in chart?    Manisha- I just received a voicemail stating this needs to be submitted by end of day(around 430pm). If this is not possible, let me know and I'll call them back to plead our case since they informed us so last minute.    Thank you!    Ethan Phoenix - [facility]    - [facility]   [facility]   [email or web address]  [email or web address]    Office: [phone]  Fax: [phone]

## 2021-01-14 NOTE — TELEPHONE ENCOUNTER
Ezio Irby MD Beard, Madeline, RN   Caller: Unspecified (Today,  1:11 PM)             5      Per Dr. Irby, SLEDAI is listed above.    Manisha Michael RN  Rheumatology Clinic

## 2021-01-27 ENCOUNTER — VIRTUAL VISIT (OUTPATIENT)
Dept: RHEUMATOLOGY | Facility: CLINIC | Age: 26
End: 2021-01-27
Attending: INTERNAL MEDICINE
Payer: COMMERCIAL

## 2021-01-27 DIAGNOSIS — Z51.81 MEDICATION MONITORING ENCOUNTER: ICD-10-CM

## 2021-01-27 DIAGNOSIS — M79.605 PAIN IN BOTH LOWER EXTREMITIES: Primary | ICD-10-CM

## 2021-01-27 DIAGNOSIS — E55.9 VITAMIN D DEFICIENCY: ICD-10-CM

## 2021-01-27 DIAGNOSIS — M79.604 PAIN IN BOTH LOWER EXTREMITIES: Primary | ICD-10-CM

## 2021-01-27 DIAGNOSIS — M32.9 SYSTEMIC LUPUS ERYTHEMATOSUS, UNSPECIFIED SLE TYPE, UNSPECIFIED ORGAN INVOLVEMENT STATUS (H): Primary | ICD-10-CM

## 2021-01-27 PROCEDURE — 99214 OFFICE O/P EST MOD 30 MIN: CPT | Mod: GT | Performed by: INTERNAL MEDICINE

## 2021-01-27 RX ORDER — TRAMADOL HYDROCHLORIDE 50 MG/1
50 TABLET ORAL EVERY 12 HOURS PRN
Qty: 60 TABLET | Refills: 3 | Status: SHIPPED | OUTPATIENT
Start: 2021-01-27 | End: 2021-07-29

## 2021-01-27 ASSESSMENT — PAIN SCALES - GENERAL: PAINLEVEL: SEVERE PAIN (7)

## 2021-01-27 NOTE — RESULT ENCOUNTER NOTE
Overall stable labs. Recommend repeat labs in 3/2021 (ordered). Also, please make a follow up appointment with me.

## 2021-01-27 NOTE — LETTER
1/27/2021       RE: Yvonne Barron  [address]  [place] [address]     Dear Colleague,    Thank you for referring your patient, Yvonne Barron, to the [facility] [place] [facility]. Please see a copy of my visit note below.    Yvonne is a 25 year old who is being evaluated via a billable video visit.      How would you like to obtain your AVS? Mail a copy  If the video visit is dropped, the invitation should be resent by: Send to e-mail at: [email or web address]  Will anyone else be joining your video visit? No    Video Start Time: 3:30 pm    Video End Time: 3:42 pm    Originating Location (pt. Location): Home    Distant Location (provider location):  [facility] [place]     Platform used for Video Visit: [facility]     Rheumatology Clinic F/U Virtual Visit Note    Last seen: 11/13/2020    DOS: 1/27/2021      Reason for visit: SLE    HPI:    Yvonne is a 24 yo woman with a hx of Graves disease s/p radioactive iodine ablation in 2013, post-ablation hypothyroidism and SLE Dx in 11/2018 who presents for follow up. She initially presented with raynaud's, rash over face and legs, nasal and oral ulcers, weight loss of 30 lbs, and severe fatigue. Was found to have Leukopenia, thrombocytopenia (130), sed rate 45, SHANDA 1:320, Sm>8, SSA>8, RNP>8, DS DNA 17, low C3 C4.    In 6/2020, we reduced the cellcept dose from 3 gr to 2 gr a day given weight loss. At last visit, was tapered to 1 gram a day and benlysta was added, she tolerates benlysta well.    She reports having dull pain over her legs. The pain is below the knees, over the shins. It feels like she walked all day. Both legs hurt at the end of the day. Zanaflex lost benefit.    Acupuncture helps.    Her appetite is better, she is forcing herself to east ensure twice a day. She gets full with one spoon, tries to have 3 meals a day plus  high calorie snacks. She was scheduled for EGD/colonoscopy early Jan for work up of weight loss which she canceled after she started to gain the weight back by tapering cellcept down.    She was going down to hit 100 lbs, but gained some weight since last visit and is now 114 lbs.    Her hair grew back. No skin rash, oral ulcers. Raycolette's is under good control without revatio.    Had HCQ eye exam few months ago.        Family hx negative for autoimmune disease, positive for diabetes and hypertension.      REVIEW OF SYMPTOMS:  A comprehensive ROS was done. Positives are per HPI.        HISTORY REVIEW:  Past Medical History:   Diagnosis Date     Hypothyroidism      Lupus (systemic lupus erythematosus) (H)      No past surgical history on file.  No family history on file.  Social History     Socioeconomic History     Marital status: Single     Spouse name: Not on file     Number of children: Not on file     Years of education: Not on file     Highest education level: Not on file   Occupational History     Not on file   Social Needs     Financial resource strain: Not on file     Food insecurity     Worry: Not on file     Inability: Not on file     Transportation needs     Medical: Not on file     Non-medical: Not on file   Tobacco Use     Smoking status: Never Smoker     Smokeless tobacco: Never Used   Substance and Sexual Activity     Alcohol use: No     Frequency: Never     Drug use: No     Sexual activity: Never   Lifestyle     Physical activity     Days per week: Not on file     Minutes per session: Not on file     Stress: Not on file   Relationships     Social connections     Talks on phone: Not on file     Gets together: Not on file     Attends Spiritism service: Not on file     Active member of club or organization: Not on file     Attends meetings of clubs or organizations: Not on file     Relationship status: Not on file     Intimate partner violence     Fear of current or ex partner: Not on file      Emotionally abused: Not on file     Physically abused: Not on file     Forced sexual activity: Not on file   Other Topics Concern     Not on file   Social History Narrative     Not on file     Patient Active Problem List   Diagnosis     Systemic lupus erythematosus (H)     No Known Allergies    I reviewed the Current Medications:  Outpatient Medications Prior to Visit   Medication Sig Dispense Refill     hydroquinone (CRISTA) 4 % external cream Apply to hyperpigmented areas twice a day 30 g 3     hydroxychloroquine (PLAQUENIL) 200 MG tablet 200 mg bid on Mon/Wed/Fri and 200 mg a day the rest of the week. Get annual eye exams for hydroxychloroquine (Plaquenil) monitoring and fax to [phone]. 120 tablet 0     levothyroxine (SYNTHROID/LEVOTHROID) 75 MCG tablet Take 75 mcg by mouth daily       mycophenolate (GENERIC EQUIVALENT) 500 MG tablet Take 1 tablet (500 mg) by mouth 2 times daily 120 tablet 3     tiZANidine (ZANAFLEX) 4 MG tablet Take 1 tablet (4 mg) by mouth 3 times daily as needed for muscle spasms 60 tablet 0     tretinoin (RETIN-A) 0.025 % external cream Use every night.  Appt due 3/21. 45 g 1     sildenafil (REVATIO) 20 MG tablet Take 1 tablet (20 mg) by mouth 3 times daily (Patient not taking: Reported on 12/22/2020) 270 tablet 2     No facility-administered medications prior to visit.        PHYSICAL EXAM      GA: NAD, pleasant  HEENT: nl conj, sclera, EOMI  MSK: no synovitis  Skin: no rash  Neuro: non focal  Psych: nl affect    Component      Latest Ref Rng & Units 1/4/2021   WBC      4.0 - 11.0 10e9/L 1.6 (L)   RBC Count      3.8 - 5.2 10e12/L 3.88   Hemoglobin      11.7 - 15.7 g/dL 10.9 (L)   Hematocrit      35.0 - 47.0 % 35.3   MCV      78 - 100 fl 91   MCH      26.5 - 33.0 pg 28.1   MCHC      31.5 - 36.5 g/dL 30.9 (L)   RDW      10.0 - 15.0 % 13.0   Platelet Count      150 - 450 10e9/L 165   Diff Method       Automated Method   % Neutrophils      % 66.5   % Lymphocytes      % 24.8   % Monocytes       % 8.1   % Eosinophils      % 0.6   % Basophils      % 0.0   % Immature Granulocytes      % 0.0   Nucleated RBCs      0 /100 0   Absolute Neutrophil      1.6 - 8.3 10e9/L 1.1 (L)   Absolute Lymphocytes      0.8 - 5.3 10e9/L 0.4 (L)   Absolute Monocytes      0.0 - 1.3 10e9/L 0.1   Absolute Eosinophils      0.0 - 0.7 10e9/L 0.0   Absolute Basophils      0.0 - 0.2 10e9/L 0.0   Abs Immature Granulocytes      0 - 0.4 10e9/L 0.0   Absolute Nucleated RBC       0.0   Creatinine      0.52 - 1.04 mg/dL 0.44 (L)   GFR Estimate      >60 mL/min/1.73:m2 >90   GFR Estimate If Black      >60 mL/min/1.73:m2 >90   Sed Rate      0 - 20 mm/h 46 (H)   DNA-ds      <10 IU/mL 145 (H)   CRP Inflammation      0.0 - 8.0 mg/L <2.9   Complement C3      81 - 157 mg/dL 72 (L)   Complement C4      13 - 39 mg/dL 3 (L)   AST      0 - 45 U/L 14   Albumin      3.4 - 5.0 g/dL 3.2 (L)   ALT      0 - 50 U/L 12       ASSESSMENT and PLAN:    SLE with weight loss, active lupus serology (+anti-DNA, low C3/C4 1/2021), leukopenia (1.6 in 11/2021) but resolved chill julia lesions over toes. She is now up to 114 lbs from almost 100 lbs since 6/2020, we reduced cellcept from 3 to 2 gram a day in 6/2020 and we did further tapered to 1 gram a day at last visit as it might be causing weight loss. We added benlysta at last visit, she tolerates it well. Her only complaint is leg pain, zanflex is not helping. Recommend a tial of tramadol; risks were discussed.      Today plan of care:    Continue cellcept 500 mg bid    Continue benlysta monthly infusions (since 12/8/2020, peak benefit is 6 months)    Labs in 3/2021 with benlysta infusion then [id number]    Tramadol 50 mg twice a day as needed for pain; risks were discussed    Continue plaquenil 2 tabs a day M/W/F, 1 tab a day the rest of the week (adjuted the dose given weight loss) with annual eye exam    GI follow up for weight loss, did cancel EGD/colooscopy as gained weight by tapering cellcept, might do it in  future if weight goes down again    RTC 3-4 months        Ezio Irby MD

## 2021-01-27 NOTE — PROGRESS NOTES
Yvonne is a 25 year old who is being evaluated via a billable video visit.      How would you like to obtain your AVS? Mail a copy  If the video visit is dropped, the invitation should be resent by: Send to e-mail at: [email or web address]  Will anyone else be joining your video visit? No    Video Start Time: 3:30 pm    Video End Time: 3:42 pm    Originating Location (pt. Location): Home    Distant Location (provider location):  [facility] [place]     Platform used for Video Visit: [facility]     Rheumatology Clinic F/U Virtual Visit Note    Last seen: 11/13/2020    DOS: 1/27/2021      Reason for visit: SLE    HPI:    Yvonne is a 24 yo woman with a hx of Graves disease s/p radioactive iodine ablation in 2013, post-ablation hypothyroidism and SLE Dx in 11/2018 who presents for follow up. She initially presented with raynaud's, rash over face and legs, nasal and oral ulcers, weight loss of 30 lbs, and severe fatigue. Was found to have Leukopenia, thrombocytopenia (130), sed rate 45, SHANDA 1:320, Sm>8, SSA>8, RNP>8, DS DNA 17, low C3 C4.    In 6/2020, we reduced the cellcept dose from 3 gr to 2 gr a day given weight loss. At last visit, was tapered to 1 gram a day and benlysta was added, she tolerates benlysta well.    She reports having dull pain over her legs. The pain is below the knees, over the shins. It feels like she walked all day. Both legs hurt at the end of the day. Zanaflex lost benefit.    Acupuncture helps.    Her appetite is better, she is forcing herself to east ensure twice a day. She gets full with one spoon, tries to have 3 meals a day plus high calorie snacks. She was scheduled for EGD/colonoscopy early Jan for work up of weight loss which she canceled after she started to gain the weight back by tapering cellcept down.    She was going down to hit 100 lbs, but gained some weight since last visit and is now 114 lbs.    Her hair grew back. No skin rash, oral ulcers. Raynuad's  is under good control without revatio.    Had HCQ eye exam few months ago.        Family hx negative for autoimmune disease, positive for diabetes and hypertension.      REVIEW OF SYMPTOMS:  A comprehensive ROS was done. Positives are per HPI.        HISTORY REVIEW:  Past Medical History:   Diagnosis Date     Hypothyroidism      Lupus (systemic lupus erythematosus) (H)      No past surgical history on file.  No family history on file.  Social History     Socioeconomic History     Marital status: Single     Spouse name: Not on file     Number of children: Not on file     Years of education: Not on file     Highest education level: Not on file   Occupational History     Not on file   Social Needs     Financial resource strain: Not on file     Food insecurity     Worry: Not on file     Inability: Not on file     Transportation needs     Medical: Not on file     Non-medical: Not on file   Tobacco Use     Smoking status: Never Smoker     Smokeless tobacco: Never Used   Substance and Sexual Activity     Alcohol use: No     Frequency: Never     Drug use: No     Sexual activity: Never   Lifestyle     Physical activity     Days per week: Not on file     Minutes per session: Not on file     Stress: Not on file   Relationships     Social connections     Talks on phone: Not on file     Gets together: Not on file     Attends L[employer]-day service: Not on file     Active member of club or organization: Not on file     Attends meetings of clubs or organizations: Not on file     Relationship status: Not on file     Intimate partner violence     Fear of current or ex partner: Not on file     Emotionally abused: Not on file     Physically abused: Not on file     Forced sexual activity: Not on file   Other Topics Concern     Not on file   Social History Narrative     Not on file     Patient Active Problem List   Diagnosis     Systemic lupus erythematosus (H)     No Known Allergies    I reviewed the Current Medications:  Outpatient  Medications Prior to Visit   Medication Sig Dispense Refill     hydroquinone (CRISTA) 4 % external cream Apply to hyperpigmented areas twice a day 30 g 3     hydroxychloroquine (PLAQUENIL) 200 MG tablet 200 mg bid on Mon/Wed/Fri and 200 mg a day the rest of the week. Get annual eye exams for hydroxychloroquine (Plaquenil) monitoring and fax to [phone]. 120 tablet 0     levothyroxine (SYNTHROID/LEVOTHROID) 75 MCG tablet Take 75 mcg by mouth daily       mycophenolate (GENERIC EQUIVALENT) 500 MG tablet Take 1 tablet (500 mg) by mouth 2 times daily 120 tablet 3     tiZANidine (ZANAFLEX) 4 MG tablet Take 1 tablet (4 mg) by mouth 3 times daily as needed for muscle spasms 60 tablet 0     tretinoin (RETIN-A) 0.025 % external cream Use every night.  Appt due 3/21. 45 g 1     sildenafil (REVATIO) 20 MG tablet Take 1 tablet (20 mg) by mouth 3 times daily (Patient not taking: Reported on 12/22/2020) 270 tablet 2     No facility-administered medications prior to visit.        PHYSICAL EXAM      GA: NAD, pleasant  HEENT: nl conj, sclera, EOMI  MSK: no synovitis  Skin: no rash  Neuro: non focal  Psych: nl affect    Component      Latest Ref Rng & Units 1/4/2021   WBC      4.0 - 11.0 10e9/L 1.6 (L)   RBC Count      3.8 - 5.2 10e12/L 3.88   Hemoglobin      11.7 - 15.7 g/dL 10.9 (L)   Hematocrit      35.0 - 47.0 % 35.3   MCV      78 - 100 fl 91   MCH      26.5 - 33.0 pg 28.1   MCHC      31.5 - 36.5 g/dL 30.9 (L)   RDW      10.0 - 15.0 % 13.0   Platelet Count      150 - 450 10e9/L 165   Diff Method       Automated Method   % Neutrophils      % 66.5   % Lymphocytes      % 24.8   % Monocytes      % 8.1   % Eosinophils      % 0.6   % Basophils      % 0.0   % Immature Granulocytes      % 0.0   Nucleated RBCs      0 /100 0   Absolute Neutrophil      1.6 - 8.3 10e9/L 1.1 (L)   Absolute Lymphocytes      0.8 - 5.3 10e9/L 0.4 (L)   Absolute Monocytes      0.0 - 1.3 10e9/L 0.1   Absolute Eosinophils      0.0 - 0.7 10e9/L 0.0   Absolute  Basophils      0.0 - 0.2 10e9/L 0.0   Abs Immature Granulocytes      0 - 0.4 10e9/L 0.0   Absolute Nucleated RBC       0.0   Creatinine      0.52 - 1.04 mg/dL 0.44 (L)   GFR Estimate      >60 mL/min/1.73:m2 >90   GFR Estimate If Black      >60 mL/min/1.73:m2 >90   Sed Rate      0 - 20 mm/h 46 (H)   DNA-ds      <10 IU/mL 145 (H)   CRP Inflammation      0.0 - 8.0 mg/L <2.9   Complement C3      81 - 157 mg/dL 72 (L)   Complement C4      13 - 39 mg/dL 3 (L)   AST      0 - 45 U/L 14   Albumin      3.4 - 5.0 g/dL 3.2 (L)   ALT      0 - 50 U/L 12       ASSESSMENT and PLAN:    SLE with weight loss, active lupus serology (+anti-DNA, low C3/C4 1/2021), leukopenia (1.6 in 11/2021) but resolved chill julia lesions over toes. She is now up to 114 lbs from almost 100 lbs since 6/2020, we reduced cellcept from 3 to 2 gram a day in 6/2020 and we did further tapered to 1 gram a day at last visit as it might be causing weight loss. We added benlysta at last visit, she tolerates it well. Her only complaint is leg pain, zanflex is not helping. Recommend a tial of tramadol; risks were discussed.      Today plan of care:    Continue cellcept 500 mg bid    Continue benlysta monthly infusions (since 12/8/2020, peak benefit is 6 months)    Labs in 3/2021 with benlysta infusion then [id number]    Tramadol 50 mg twice a day as needed for pain; risks were discussed    Continue plaquenil 2 tabs a day M/W/F, 1 tab a day the rest of the week (adjuted the dose given weight loss) with annual eye exam    GI follow up for weight loss, did cancel EGD/colooscopy as gained weight by tapering cellcept, might do it in future if weight goes down again    RTC 3-4 months        Ezio Irby MD

## 2021-01-27 NOTE — PATIENT INSTRUCTIONS
Labs in 3/2021 with benlysta infusion    Tramadol 50 mg twice a day as needed for pain    Return in 3-4 months

## 2021-02-01 ENCOUNTER — TELEPHONE (OUTPATIENT)
Dept: RHEUMATOLOGY | Facility: CLINIC | Age: 26
End: 2021-02-01

## 2021-02-01 ENCOUNTER — INFUSION THERAPY VISIT (OUTPATIENT)
Dept: INFUSION THERAPY | Facility: CLINIC | Age: 26
End: 2021-02-01
Attending: INTERNAL MEDICINE
Payer: COMMERCIAL

## 2021-02-01 VITALS
BODY MASS INDEX: 18.25 KG/M2 | OXYGEN SATURATION: 100 % | HEART RATE: 89 BPM | SYSTOLIC BLOOD PRESSURE: 90 MMHG | WEIGHT: 113.1 LBS | DIASTOLIC BLOOD PRESSURE: 59 MMHG | RESPIRATION RATE: 16 BRPM | TEMPERATURE: 98.1 F

## 2021-02-01 DIAGNOSIS — M32.9 SYSTEMIC LUPUS ERYTHEMATOSUS, UNSPECIFIED SLE TYPE, UNSPECIFIED ORGAN INVOLVEMENT STATUS (H): Primary | ICD-10-CM

## 2021-02-01 PROCEDURE — 96375 TX/PRO/DX INJ NEW DRUG ADDON: CPT

## 2021-02-01 PROCEDURE — 250N000011 HC RX IP 250 OP 636: Performed by: INTERNAL MEDICINE

## 2021-02-01 PROCEDURE — 250N000013 HC RX MED GY IP 250 OP 250 PS 637: Performed by: INTERNAL MEDICINE

## 2021-02-01 PROCEDURE — 258N000003 HC RX IP 258 OP 636: Performed by: INTERNAL MEDICINE

## 2021-02-01 PROCEDURE — 96365 THER/PROPH/DIAG IV INF INIT: CPT

## 2021-02-01 RX ORDER — ACETAMINOPHEN 325 MG/1
650 TABLET ORAL ONCE
Status: CANCELLED
Start: 2021-02-02

## 2021-02-01 RX ORDER — DIPHENHYDRAMINE HCL 25 MG
25 CAPSULE ORAL ONCE
Status: CANCELLED
Start: 2021-02-02

## 2021-02-01 RX ORDER — ACETAMINOPHEN 325 MG/1
650 TABLET ORAL ONCE
Status: COMPLETED | OUTPATIENT
Start: 2021-02-01 | End: 2021-02-01

## 2021-02-01 RX ORDER — HEPARIN SODIUM (PORCINE) LOCK FLUSH IV SOLN 100 UNIT/ML 100 UNIT/ML
5 SOLUTION INTRAVENOUS
Status: CANCELLED | OUTPATIENT
Start: 2021-02-02

## 2021-02-01 RX ORDER — DIPHENHYDRAMINE HCL 25 MG
25 CAPSULE ORAL ONCE
Status: COMPLETED | OUTPATIENT
Start: 2021-02-01 | End: 2021-02-01

## 2021-02-01 RX ORDER — METHYLPREDNISOLONE SODIUM SUCCINATE 125 MG/2ML
125 INJECTION, POWDER, LYOPHILIZED, FOR SOLUTION INTRAMUSCULAR; INTRAVENOUS ONCE
Status: COMPLETED | OUTPATIENT
Start: 2021-02-01 | End: 2021-02-01

## 2021-02-01 RX ORDER — HEPARIN SODIUM,PORCINE 10 UNIT/ML
5 VIAL (ML) INTRAVENOUS
Status: CANCELLED | OUTPATIENT
Start: 2021-02-02

## 2021-02-01 RX ORDER — METHYLPREDNISOLONE SODIUM SUCCINATE 125 MG/2ML
125 INJECTION, POWDER, LYOPHILIZED, FOR SOLUTION INTRAMUSCULAR; INTRAVENOUS ONCE
Status: CANCELLED | OUTPATIENT
Start: 2021-02-02

## 2021-02-01 RX ADMIN — DIPHENHYDRAMINE HYDROCHLORIDE 25 MG: 25 CAPSULE ORAL at 09:23

## 2021-02-01 RX ADMIN — BELIMUMAB 520 MG: 400 INJECTION, POWDER, LYOPHILIZED, FOR SOLUTION INTRAVENOUS at 10:14

## 2021-02-01 RX ADMIN — METHYLPREDNISOLONE SODIUM SUCCINATE 125 MG: 125 INJECTION, POWDER, FOR SOLUTION INTRAMUSCULAR; INTRAVENOUS at 09:24

## 2021-02-01 RX ADMIN — ACETAMINOPHEN 650 MG: 325 TABLET ORAL at 09:23

## 2021-02-01 NOTE — LETTER
2/1/2021         RE: Yvonne Barron  [address]  [place] [address]        Dear Colleague,    Thank you for referring your patient, Yvonne Barron, to the [facility]. Please see a copy of my visit note below.    Nursing Note  Yvonne Barron [facility] for:   Chief Complaint   Patient presents with     Infusion     Benlysta     During today's [facility] appointment, orders from Dr. Ezio Irby were completed.  Frequency: monthly    Progress note:  Patient identification verified by name and date of birth.  Assessment completed.  Vitals recorded in Doc Flowsheets.  Patient was provided with education regarding medication/procedure and possible side effects.  Patient verbalized understanding.     present during visit today: Not Applicable.    Treatment Conditions: ~~~ NOTE: If the patient answers yes to any of the questions below, hold the infusion and contact ordering provider or on-call provider.    1. Have you recently had an elevated temperature, fever, chills, productive cough, coughing for 3 weeks or longer or hemoptysis, abnormal vital signs, night sweats,  chest pain or have you noticed a decrease in your appetite, unexplained weight loss or fatigue? No  2. Do you have any open wounds or new incisions? No  3. Do you have any recent or upcoming hospitalizations, surgeries or dental procedures? No  4. Do you currently have or recently have had any signs of illness or infection or are you on any antibiotics? No  5. Have you had any new, sudden or worsening abdominal pain? No  6. Have you or anyone in your household received a live vaccination in the past 4 weeks? Please note:  No live vaccines while on biologic/chemotherapy until 6 months after the last treatment.  Patient can receive the flu vaccine (shot only) and the pneumovax.  It is  optimal for the patient to get these vaccines mid cycle, but they can be given at any time as long as it is not on the day of the infusion. No  7. Have you recently been diagnosed with any new nervous system diseases (ie. Multiple sclerosis, Guillain [place], seizures, neurological changes) or cancer diagnosis? No  8. Are you on any form of radiation or chemotherapy? No  9. Are you pregnant or breast feeding or do you have plans of pregnancy in the future? No  10. Have you been having any signs of worsening depression or suicidal ideations?  (benlysta only) No  11. Have there been any other new onset medical symptoms? No      Premedications: administered per order.    Drug Waste Record: No    Infusion length and rate:  infusion given over approximately 60 minutes    Labs: were not ordered for this appointment. Patient was unable to give urine sample today.     Vascular access: peripheral IV placed today.    Is the next appt scheduled? Yes, message sent to scheduling team  Asymptomatic COVID test completed? Patient declined    POST-INFUSION OF BIOLOGICAL MEDICATION:     Reviewed with patient.  Given biologic medication or medication hand-out. Inform patient if any fever, chills or signs of infection, new symptoms, abdominal pain, heart palpitations, shortness of breath, reaction, weakness, neurological changes, seek medical attention immediately and should not receive infusions. No live virus vaccines prior to or during treatment or up to 6 months post infusion. If the patient has an upcoming procedure or surgery, this should be discussed with the rheumatologist and surgeon or provider.    Post Infusion Assessment:  Patient tolerated infusion without incident.  Blood return noted pre and post infusion.  Site patent and intact, free from redness, edema or discomfort.  No evidence of extravasations.  Access discontinued per protocol.     Discharge Plan:   Follow up plan of care with: ongoing inf[facility].  Discharge instructions were reviewed with patient.  Patient/representative verbalized understanding of discharge instructions and all questions answered.  Patient discharged from [facility] in stable condition.    Shanell Ocampo RN       Administrations This Visit     acetaminophen (TYLENOL) tablet 650 mg     Admin Date  02/01/2021 Action  Given Dose  650 mg Route  Oral Administered By  Shanell Ocampo RN          belimumab (BENLYSTA) 520 mg in sodium chloride 0.9 % 282 mL INFUSION     Admin Date  02/01/2021 Action  New Bag Dose  520 mg Rate  282 mL/hr Route  Intravenous Administered By  Shanell Ocampo RN          diphenhydrAMINE (BENADRYL) capsule 25 mg     Admin Date  02/01/2021 Action  Given Dose  25 mg Route  Oral Administered By  Shanell Ocampo RN          methylPREDNISolone sodium succinate (solu-MEDROL) injection 125 mg     Admin Date  02/01/2021 Action  Given Dose  125 mg Route  Intravenous Administered By  Shanell Ocampo RN                BP 95/66   Pulse 96   Temp 98.1  F (36.7  C) (Oral)   Resp 16   Wt 51.3 kg (113 lb 1.6 oz)   SpO2 100%   BMI 18.25 kg/m          Again, thank you for allowing me to participate in the care of your patient.        Sincerely,        [facility]

## 2021-02-01 NOTE — PATIENT INSTRUCTIONS
Dear Yvonne Barron    Thank you for choosing [facility] ([facility]) for your infusion.  The following information is a summary of our appointment as well as important reminders.      EDUCATION POST BIOLOGICAL/CHEMOTHERAPY INFUSION  Call the triage nurse at your clinic or seek medical attention if you have chills and/or temperature greater than or equal to 100.5, uncontrolled nausea/vomiting, diarrhea, constipation, dizziness, shortness of breath, chest pain, heart palpitations, weakness or any other new or concerning symptoms, questions or concerns.  You can not have any live virus vaccines prior to or during treatment or up to 6 months post infusion.  If you have an upcoming surgery, medical procedure or dental procedure during treatment, this should be discussed with your ordering physician and your surgeon/dentist.  If you are having any concerning symptom, if you are unsure if you should get your next infusion or wish to speak to a provider before your next infusion, please call your care coordinator or triage nurse at your clinic to notify them so we can adequately serve you.       Patient Education     Belimumab Solution for injection  What is this medicine?  BELIMUMAB (be LOO ue mab) is a medicine used to treat a certain type of systemic lupus erythematosus (SLE). This medicine is used with standard therapy for SLE. It is not a cure.  This medicine may be used for other purposes; ask your health care provider or pharmacist if you have questions.  What should I tell my health care provider before I take this medicine?  They need to know if you have any of these conditions:    heart disease    immune system problems    infection (especially a virus infection such as chickenpox, cold sores, or herpes)    mental illness    suicidal thoughts, plans, or attempt; a previous suicide attempt by you or a family member    an unusual or allergic reaction to  belimumab, other medicines, foods, dyes, or preservatives    pregnant or trying to get pregnant    breast-feeding  How should I use this medicine?  This medicine is for infusion into a vein. It is given by a health care professional in a hospital or clinic setting.  A special MedGuide will be given to you by the pharmacist with each prescription and refill. Be sure to read this information carefully each time.  Talk to your pediatrician regarding the use of this medicine in children. Special care may be needed.  Overdosage: If you think you've taken too much of this medicine contact a poison control center or emergency room at once.  NOTE: This medicine is only for you. Do not share this medicine with others.  What if I miss a dose?  It is important not to miss your dose. Call your doctor or health care professional if you are unable to keep an appointment.  What may interact with this medicine?  Do not take this medicine with any of the following medications:    live virus vaccines  This medicine may also interact with the following medications:    cyclophosphamide    ofatumumab    rituximab  This list may not describe all possible interactions. Give your health care provider a list of all the medicines, herbs, non-prescription drugs, or dietary supplements you use. Also tell them if you smoke, drink alcohol, or use illegal drugs. Some items may interact with your medicine.  What should I watch for while using this medicine?  Tell your doctor or healthcare professional if your symptoms do not start to get better or if they get worse.  Your condition will be monitored carefully while you are receiving this medicine.  What side effects may I notice from receiving this medicine?  Side effects that you should report to your doctor or health care professional as soon as possible:    allergic reactions like skin rash, itching or hives, swelling of the face, lips, or tongue    depressed mood    signs of infection - fever  or chills, cough, sore throat, pain or difficulty passing urine    suicidal thoughts or other mood changes    unusually slow heartbeat  Side effects that usually do not require medical attention (Report these to your doctor or health care professional if they continue or are bothersome.):    diarrhea    headache    muscle aches    nausea, vomiting    trouble sleeping  This list may not describe all possible side effects. Call your doctor for medical advice about side effects. You may report side effects to FDA at 4-437-QUJ-2241.  Where should I keep my medicine?  This drug is given in a hospital or clinic and will not be stored at home.  NOTE: This sheet is a summary. It may not cover all possible information. If you have questions about this medicine, talk to your doctor, pharmacist, or health care provider.  NOTE:This sheet is a summary. It may not cover all possible information. If you have questions about this medicine, talk to your doctor, pharmacist, or health care provider. Copyright  2016 Gold Standard             We look forward in seeing you on your next appointment here at [facility] ([facility]).  Please don t hesitate to call us at [phone] to reschedule any of your appointments or to speak with one of the [facility] registered nurses.  It was a pleasure taking care of you today.    Sincerely,    [facility]  [facility]  [address] [place] [address]  [address]  Phone:  [phone]

## 2021-02-01 NOTE — TELEPHONE ENCOUNTER
[facility] Call Center    Phone Message    May a detailed message be left on voicemail: yes     Reason for Call: Pt called to ask if she is able to receive the Covid vaccine. She completed an infusion today and is concerned about possible interactions.    Action Taken: Message routed to:  [facility] ([facility]): yes    Travel Screening: Not Applicable

## 2021-02-01 NOTE — PROGRESS NOTES
Nursing Note  Yvonne Barron presents [facility] for:   Chief Complaint   Patient presents with     Infusion     Benlysta     During today's [facility] appointment, orders from Dr. Ezio Irby were completed.  Frequency: monthly    Progress note:  Patient identification verified by name and date of birth.  Assessment completed.  Vitals recorded in Doc Flowsheets.  Patient was provided with education regarding medication/procedure and possible side effects.  Patient verbalized understanding.     present during visit today: Not Applicable.    Treatment Conditions: ~~~ NOTE: If the patient answers yes to any of the questions below, hold the infusion and contact ordering provider or on-call provider.    1. Have you recently had an elevated temperature, fever, chills, productive cough, coughing for 3 weeks or longer or hemoptysis, abnormal vital signs, night sweats,  chest pain or have you noticed a decrease in your appetite, unexplained weight loss or fatigue? No  2. Do you have any open wounds or new incisions? No  3. Do you have any recent or upcoming hospitalizations, surgeries or dental procedures? No  4. Do you currently have or recently have had any signs of illness or infection or are you on any antibiotics? No  5. Have you had any new, sudden or worsening abdominal pain? No  6. Have you or anyone in your household received a live vaccination in the past 4 weeks? Please note:  No live vaccines while on biologic/chemotherapy until 6 months after the last treatment.  Patient can receive the flu vaccine (shot only) and the pneumovax.  It is optimal for the patient to get these vaccines mid cycle, but they can be given at any time as long as it is not on the day of the infusion. No  7. Have you recently been diagnosed with any new nervous system diseases (ie. Multiple sclerosis, Guillain [place], seizures, neurological changes) or  cancer diagnosis? No  8. Are you on any form of radiation or chemotherapy? No  9. Are you pregnant or breast feeding or do you have plans of pregnancy in the future? No  10. Have you been having any signs of worsening depression or suicidal ideations?  (benlysta only) No  11. Have there been any other new onset medical symptoms? No      Premedications: administered per order.    Drug Waste Record: No    Infusion length and rate:  infusion given over approximately 60 minutes    Labs: were not ordered for this appointment. Patient was unable to give urine sample today.     Vascular access: peripheral IV placed today.    Is the next appt scheduled? Yes, message sent to scheduling team  Asymptomatic COVID test completed? Patient declined    POST-INFUSION OF BIOLOGICAL MEDICATION:     Reviewed with patient.  Given biologic medication or medication hand-out. Inform patient if any fever, chills or signs of infection, new symptoms, abdominal pain, heart palpitations, shortness of breath, reaction, weakness, neurological changes, seek medical attention immediately and should not receive infusions. No live virus vaccines prior to or during treatment or up to 6 months post infusion. If the patient has an upcoming procedure or surgery, this should be discussed with the rheumatologist and surgeon or provider.    Post Infusion Assessment:  Patient tolerated infusion without incident.  Blood return noted pre and post infusion.  Site patent and intact, free from redness, edema or discomfort.  No evidence of extravasations.  Access discontinued per protocol.     Discharge Plan:   Follow up plan of care with: ongoing infusions at [facility].  Discharge instructions were reviewed with patient.  Patient/representative verbalized understanding of discharge instructions and all questions answered.  Patient discharged from [facility] in stable condition.    Shanell Ocampo RN        Administrations This Visit     acetaminophen (TYLENOL) tablet 650 mg     Admin Date  02/01/2021 Action  Given Dose  650 mg Route  Oral Administered By  Shanell Ocampo RN          belimumab (BENLYSTA) 520 mg in sodium chloride 0.9 % 282 mL INFUSION     Admin Date  02/01/2021 Action  New Bag Dose  520 mg Rate  282 mL/hr Route  Intravenous Administered By  Shanell Ocampo RN          diphenhydrAMINE (BENADRYL) capsule 25 mg     Admin Date  02/01/2021 Action  Given Dose  25 mg Route  Oral Administered By  Shanell Ocampo RN          methylPREDNISolone sodium succinate (solu-MEDROL) injection 125 mg     Admin Date  02/01/2021 Action  Given Dose  125 mg Route  Intravenous Administered By  Shanell Ocampo RN                BP 95/66   Pulse 96   Temp 98.1  F (36.7  C) (Oral)   Resp 16   Wt 51.3 kg (113 lb 1.6 oz)   SpO2 100%   BMI 18.25 kg/m

## 2021-02-02 NOTE — TELEPHONE ENCOUNTER
Ezio Irby MD Beard, Madeline, RN   Caller: Unspecified (Yesterday, 12:47 PM)             When is she going to get the vaccine? Could hold cellcept 3 days before and 7 days after the vaccine, better to get the vaccine 2 wk after benlysta infusion if she could but if she can't, ok to take it whenever it's offered.     Left message requesting return call.    Manisha Michael RN  Rheumatology Clinic

## 2021-02-02 NOTE — TELEPHONE ENCOUNTER
Pt said she is getting it because she is in a clinical study and she will be getting the vaccine on Thursday this week, 2/4. They will not know if it is Pfizer or Moderna.  She needs to have vaccine in order to finish school, so she has to get it this week.  She is not able to schedule it around her Benlysta, she will hold medication for the second dose whenever it is given as well.    Manisha Michael RN  [facility]

## 2021-02-16 ENCOUNTER — TELEPHONE (OUTPATIENT)
Dept: DERMATOLOGY | Facility: CLINIC | Age: 26
End: 2021-02-16

## 2021-02-16 NOTE — TELEPHONE ENCOUNTER
[facility] Call Center    Phone Message    May a detailed message be left on voicemail: yes     Reason for Call: Other: Pt needs f/u    Action Taken: Message routed to:  [facility] ([facility] Derm    Travel Screening: Not Applicable

## 2021-03-01 ENCOUNTER — INFUSION THERAPY VISIT (OUTPATIENT)
Dept: INFUSION THERAPY | Facility: CLINIC | Age: 26
End: 2021-03-01
Attending: INTERNAL MEDICINE
Payer: COMMERCIAL

## 2021-03-01 VITALS
TEMPERATURE: 98.1 F | SYSTOLIC BLOOD PRESSURE: 94 MMHG | OXYGEN SATURATION: 99 % | BODY MASS INDEX: 18.58 KG/M2 | DIASTOLIC BLOOD PRESSURE: 62 MMHG | HEART RATE: 88 BPM | WEIGHT: 115.1 LBS | RESPIRATION RATE: 18 BRPM

## 2021-03-01 DIAGNOSIS — E55.9 VITAMIN D DEFICIENCY: ICD-10-CM

## 2021-03-01 DIAGNOSIS — Z51.81 MEDICATION MONITORING ENCOUNTER: ICD-10-CM

## 2021-03-01 DIAGNOSIS — M32.9 SYSTEMIC LUPUS ERYTHEMATOSUS, UNSPECIFIED SLE TYPE, UNSPECIFIED ORGAN INVOLVEMENT STATUS (H): Primary | ICD-10-CM

## 2021-03-01 LAB
ALBUMIN SERPL-MCNC: 3.5 G/DL (ref 3.4–5)
ALBUMIN UR-MCNC: NEGATIVE MG/DL
ALT SERPL W P-5'-P-CCNC: 14 U/L (ref 0–50)
APPEARANCE UR: CLEAR
AST SERPL W P-5'-P-CCNC: 14 U/L (ref 0–45)
BACTERIA #/AREA URNS HPF: ABNORMAL /HPF
BASOPHILS # BLD AUTO: 0 10E9/L (ref 0–0.2)
BASOPHILS NFR BLD AUTO: 0.6 %
BILIRUB UR QL STRIP: NEGATIVE
COLOR UR AUTO: COLORLESS
CREAT SERPL-MCNC: 0.53 MG/DL (ref 0.52–1.04)
CREAT UR-MCNC: 18 MG/DL
CREAT UR-MCNC: 18 MG/DL
CRP SERPL-MCNC: <2.9 MG/L (ref 0–8)
DEPRECATED CALCIDIOL+CALCIFEROL SERPL-MC: 22 UG/L (ref 20–75)
DIFFERENTIAL METHOD BLD: ABNORMAL
DSDNA AB SER-ACNC: 91 IU/ML
EOSINOPHIL # BLD AUTO: 0 10E9/L (ref 0–0.7)
EOSINOPHIL NFR BLD AUTO: 0 %
ERYTHROCYTE [DISTWIDTH] IN BLOOD BY AUTOMATED COUNT: 14.2 % (ref 10–15)
ERYTHROCYTE [SEDIMENTATION RATE] IN BLOOD BY WESTERGREN METHOD: 54 MM/H (ref 0–20)
GFR SERPL CREATININE-BSD FRML MDRD: >90 ML/MIN/{1.73_M2}
GLUCOSE UR STRIP-MCNC: NEGATIVE MG/DL
HCT VFR BLD AUTO: 33.8 % (ref 35–47)
HGB BLD-MCNC: 10.5 G/DL (ref 11.7–15.7)
HGB UR QL STRIP: NEGATIVE
IMM GRANULOCYTES # BLD: 0 10E9/L (ref 0–0.4)
IMM GRANULOCYTES NFR BLD: 0.6 %
KETONES UR STRIP-MCNC: NEGATIVE MG/DL
LEUKOCYTE ESTERASE UR QL STRIP: NEGATIVE
LYMPHOCYTES # BLD AUTO: 0.4 10E9/L (ref 0.8–5.3)
LYMPHOCYTES NFR BLD AUTO: 26.3 %
MCH RBC QN AUTO: 26 PG (ref 26.5–33)
MCHC RBC AUTO-ENTMCNC: 31.1 G/DL (ref 31.5–36.5)
MCV RBC AUTO: 84 FL (ref 78–100)
MONOCYTES # BLD AUTO: 0.2 10E9/L (ref 0–1.3)
MONOCYTES NFR BLD AUTO: 10.3 %
NEUTROPHILS # BLD AUTO: 1 10E9/L (ref 1.6–8.3)
NEUTROPHILS NFR BLD AUTO: 62.2 %
NITRATE UR QL: NEGATIVE
NRBC # BLD AUTO: 0 10*3/UL
NRBC BLD AUTO-RTO: 0 /100
PH UR STRIP: 7 PH (ref 5–7)
PLATELET # BLD AUTO: 191 10E9/L (ref 150–450)
PROT UR-MCNC: <0.05 G/L
PROT/CREAT 24H UR: NORMAL G/G CR (ref 0–0.2)
RBC # BLD AUTO: 4.04 10E12/L (ref 3.8–5.2)
RBC #/AREA URNS AUTO: 0 /HPF (ref 0–2)
SOURCE: ABNORMAL
SP GR UR STRIP: 1 (ref 1–1.03)
SQUAMOUS #/AREA URNS AUTO: <1 /HPF (ref 0–1)
UROBILINOGEN UR STRIP-MCNC: 0 MG/DL (ref 0–2)
WBC # BLD AUTO: 1.6 10E9/L (ref 4–11)
WBC #/AREA URNS AUTO: <1 /HPF (ref 0–5)

## 2021-03-01 PROCEDURE — 86225 DNA ANTIBODY NATIVE: CPT | Performed by: INTERNAL MEDICINE

## 2021-03-01 PROCEDURE — 81001 URINALYSIS AUTO W/SCOPE: CPT | Performed by: INTERNAL MEDICINE

## 2021-03-01 PROCEDURE — 85652 RBC SED RATE AUTOMATED: CPT | Performed by: INTERNAL MEDICINE

## 2021-03-01 PROCEDURE — 85025 COMPLETE CBC W/AUTO DIFF WBC: CPT | Performed by: INTERNAL MEDICINE

## 2021-03-01 PROCEDURE — 96365 THER/PROPH/DIAG IV INF INIT: CPT

## 2021-03-01 PROCEDURE — 82306 VITAMIN D 25 HYDROXY: CPT | Performed by: INTERNAL MEDICINE

## 2021-03-01 PROCEDURE — 82565 ASSAY OF CREATININE: CPT | Performed by: INTERNAL MEDICINE

## 2021-03-01 PROCEDURE — 96375 TX/PRO/DX INJ NEW DRUG ADDON: CPT

## 2021-03-01 PROCEDURE — 250N000013 HC RX MED GY IP 250 OP 250 PS 637: Performed by: INTERNAL MEDICINE

## 2021-03-01 PROCEDURE — 250N000011 HC RX IP 250 OP 636: Performed by: INTERNAL MEDICINE

## 2021-03-01 PROCEDURE — 36415 COLL VENOUS BLD VENIPUNCTURE: CPT

## 2021-03-01 PROCEDURE — 86140 C-REACTIVE PROTEIN: CPT | Performed by: INTERNAL MEDICINE

## 2021-03-01 PROCEDURE — 86160 COMPLEMENT ANTIGEN: CPT | Performed by: INTERNAL MEDICINE

## 2021-03-01 PROCEDURE — 84450 TRANSFERASE (AST) (SGOT): CPT | Performed by: INTERNAL MEDICINE

## 2021-03-01 PROCEDURE — 82040 ASSAY OF SERUM ALBUMIN: CPT | Performed by: INTERNAL MEDICINE

## 2021-03-01 PROCEDURE — 84460 ALANINE AMINO (ALT) (SGPT): CPT | Performed by: INTERNAL MEDICINE

## 2021-03-01 PROCEDURE — 84156 ASSAY OF PROTEIN URINE: CPT | Performed by: INTERNAL MEDICINE

## 2021-03-01 PROCEDURE — 258N000003 HC RX IP 258 OP 636: Performed by: INTERNAL MEDICINE

## 2021-03-01 RX ORDER — ACETAMINOPHEN 325 MG/1
650 TABLET ORAL ONCE
Status: CANCELLED
Start: 2021-03-02

## 2021-03-01 RX ORDER — DIPHENHYDRAMINE HCL 25 MG
25 CAPSULE ORAL ONCE
Status: CANCELLED
Start: 2021-03-02

## 2021-03-01 RX ORDER — ACETAMINOPHEN 325 MG/1
650 TABLET ORAL ONCE
Status: COMPLETED | OUTPATIENT
Start: 2021-03-01 | End: 2021-03-01

## 2021-03-01 RX ORDER — HEPARIN SODIUM,PORCINE 10 UNIT/ML
5 VIAL (ML) INTRAVENOUS
Status: CANCELLED | OUTPATIENT
Start: 2021-03-02

## 2021-03-01 RX ORDER — HEPARIN SODIUM (PORCINE) LOCK FLUSH IV SOLN 100 UNIT/ML 100 UNIT/ML
5 SOLUTION INTRAVENOUS
Status: CANCELLED | OUTPATIENT
Start: 2021-03-02

## 2021-03-01 RX ORDER — METHYLPREDNISOLONE SODIUM SUCCINATE 125 MG/2ML
125 INJECTION, POWDER, LYOPHILIZED, FOR SOLUTION INTRAMUSCULAR; INTRAVENOUS ONCE
Status: CANCELLED | OUTPATIENT
Start: 2021-03-02

## 2021-03-01 RX ORDER — DIPHENHYDRAMINE HCL 25 MG
25 CAPSULE ORAL ONCE
Status: COMPLETED | OUTPATIENT
Start: 2021-03-01 | End: 2021-03-01

## 2021-03-01 RX ORDER — METHYLPREDNISOLONE SODIUM SUCCINATE 125 MG/2ML
125 INJECTION, POWDER, LYOPHILIZED, FOR SOLUTION INTRAMUSCULAR; INTRAVENOUS ONCE
Status: COMPLETED | OUTPATIENT
Start: 2021-03-01 | End: 2021-03-01

## 2021-03-01 RX ADMIN — ACETAMINOPHEN 650 MG: 325 TABLET ORAL at 11:18

## 2021-03-01 RX ADMIN — METHYLPREDNISOLONE SODIUM SUCCINATE 125 MG: 125 INJECTION, POWDER, FOR SOLUTION INTRAMUSCULAR; INTRAVENOUS at 11:18

## 2021-03-01 RX ADMIN — BELIMUMAB 520 MG: 400 INJECTION, POWDER, LYOPHILIZED, FOR SOLUTION INTRAVENOUS at 11:48

## 2021-03-01 RX ADMIN — DIPHENHYDRAMINE HYDROCHLORIDE 25 MG: 25 CAPSULE ORAL at 11:18

## 2021-03-01 NOTE — PATIENT INSTRUCTIONS
Dear Yvonne Barron    Thank you for choosing [facility] ([facility]) for your infusion.  The following information is a summary of our appointment as well as important reminders.      EDUCATION POST BIOLOGICAL/CHEMOTHERAPY INFUSION  Call the triage nurse at your clinic or seek medical attention if you have chills and/or temperature greater than or equal to 100.5, uncontrolled nausea/vomiting, diarrhea, constipation, dizziness, shortness of breath, chest pain, heart palpitations, weakness or any other new or concerning symptoms, questions or concerns.  You can not have any live virus vaccines prior to or during treatment or up to 6 months post infusion.  If you have an upcoming surgery, medical procedure or dental procedure during treatment, this should be discussed with your ordering physician and your surgeon/dentist.  If you are having any concerning symptom, if you are unsure if you should get your next infusion or wish to speak to a provider before your next infusion, please call your care coordinator or triage nurse at your clinic to notify them so we can adequately serve you.    We look forward in seeing you on your next appointment here at [facility] ([facility]).  Please don t hesitate to call us at [phone] to reschedule any of your appointments or to speak with one of the [facility] registered nurses.  It was a pleasure taking care of you today.    Sincerely,    [facility]  [facility]  [address] [place] [address]  [id number]  Phone:  [phone]

## 2021-03-01 NOTE — LETTER
March 8, 2021      Yvonne Barron  [address]  [place] [address]        Dear ,    We are writing to inform you of your test results.    Stable labs, anti-DNA is improved. I faxed a vitamin D prescription to your pharmacy to replace low vitamin D    Resulted Orders   Vitamin D Deficiency   Result Value Ref Range    Vitamin D Deficiency screening 22 20 - 75 ug/L      Comment:      Season, race, dietary intake, and treatment affect the concentration of   25-hydroxy-Vitamin D. Values may decrease during winter months and increase   during summer months. Values 20-29 ug/L may indicate Vitamin D insufficiency   and values <20 ug/L may indicate Vitamin D deficiency.  Vitamin D determination is routinely performed by an immunoassay specific for   25 hydroxyvitamin D3.  If an individual is on vitamin D2 (ergocalciferol)   supplementation, please specify 25 OH vitamin D2 and D3 level determination by   LCMSMS test [id number].     Erythrocyte sedimentation rate auto   Result Value Ref Range    Sed Rate 54 (H) 0 - 20 mm/h   DNA double stranded antibodies   Result Value Ref Range    DNA-ds 91 (H) <10 IU/mL      Comment:      Positive   CRP inflammation   Result Value Ref Range    CRP Inflammation <2.9 0.0 - 8.0 mg/L   Complement C3   Result Value Ref Range    Complement C3 69 (L) 81 - 157 mg/dL   Creatinine   Result Value Ref Range    Creatinine 0.53 0.52 - 1.04 mg/dL    GFR Estimate >90 >60 mL/min/[1.73_m2]      Comment:      Non  GFR Calc  Starting 12/18/2018, serum creatinine based estimated GFR (eGFR) will be   calculated using the Chronic Kidney Disease Epidemiology Collaboration   (CKD-EPI) equation.      GFR Estimate If Black >90 >60 mL/min/[1.73_m2]      Comment:       GFR Calc  Starting 12/18/2018, serum creatinine based estimated GFR (eGFR) will be   calculated using the Chronic Kidney Disease Epidemiology Collaboration   (CKD-EPI) equation.      Complement C4   Result Value Ref Range    Complement C4 6 (L) 13 - 39 mg/dL   ALT   Result Value Ref Range    ALT 14 0 - 50 U/L   Albumin level   Result Value Ref Range    Albumin 3.5 3.4 - 5.0 g/dL   AST   Result Value Ref Range    AST 14 0 - 45 U/L   CBC with platelets differential   Result Value Ref Range    WBC 1.6 (L) 4.0 - 11.0 10e9/L    RBC Count 4.04 3.8 - 5.2 10e12/L    Hemoglobin 10.5 (L) 11.7 - 15.7 g/dL    Hematocrit 33.8 (L) 35.0 - 47.0 %    MCV 84 78 - 100 fl    MCH 26.0 (L) 26.5 - 33.0 pg    MCHC 31.1 (L) 31.5 - 36.5 g/dL    RDW 14.2 10.0 - 15.0 %    Platelet Count 191 150 - 450 10e9/L    Diff Method Automated Method     % Neutrophils 62.2 %    % Lymphocytes 26.3 %    % Monocytes 10.3 %    % Eosinophils 0.0 %    % Basophils 0.6 %    % Immature Granulocytes 0.6 %    Nucleated RBCs 0 0 /100    Absolute Neutrophil 1.0 (L) 1.6 - 8.3 10e9/L    Absolute Lymphocytes 0.4 (L) 0.8 - 5.3 10e9/L    Absolute Monocytes 0.2 0.0 - 1.3 10e9/L    Absolute Eosinophils 0.0 0.0 - 0.7 10e9/L    Absolute Basophils 0.0 0.0 - 0.2 10e9/L    Abs Immature Granulocytes 0.0 0 - 0.4 10e9/L    Absolute Nucleated RBC 0.0    Creatinine random urine   Result Value Ref Range    Creatinine Urine Random 18 mg/dL   Protein  random urine with Creat Ratio   Result Value Ref Range    Protein Random Urine <0.05 g/L    Protein Total Urine g/gr Creatinine Unable to calculate due to low value 0 - 0.2 g/g Cr   UA with Microscopic reflex to Culture   Result Value Ref Range    Color Urine Colorless     Appearance Urine Clear     Glucose Urine Negative NEG^Negative mg/dL    Bilirubin Urine Negative NEG^Negative    Ketones Urine Negative NEG^Negative mg/dL    Specific Gravity Urine 1.002 (L) 1.003 - 1.035    Blood Urine Negative NEG^Negative    pH Urine 7.0 5.0 - 7.0 pH    Protein Albumin Urine Negative NEG^Negative mg/dL    Urobilinogen mg/dL 0.0 0.0 - 2.0 mg/dL    Nitrite Urine Negative NEG^Negative    Leukocyte Esterase Urine Negative  NEG^Negative    Source Midstream Urine     WBC Urine <1 0 - 5 /HPF    RBC Urine 0 0 - 2 /HPF    Bacteria Urine Few (A) NEG^Negative /HPF    Squamous Epithelial /HPF Urine <1 0 - 1 /HPF   Creatinine urine calculation only   Result Value Ref Range    Creatinine Urine 18 mg/dL       If you have any questions or concerns, please call the clinic at the number listed above.       Sincerely,      Ezio Irby MD

## 2021-03-01 NOTE — PROGRESS NOTES
Nursing Note  Yvonne Dianaud [facility] for:   Chief Complaint   Patient presents with     Infusion     IV Benlysta     During [facility]'s [facility] appointment, orders from Dr. Irby were completed.  Frequency: monthly    Progress note:  Patient identification verified by name and date of birth.  Assessment completed.  Vitals recorded in Doc Flowsheets.  Patient was provided with education regarding medication/procedure and possible side effects.  Patient verbalized understanding.     present during visit today: Not Applicable.    Treatment Conditions: ~~~ NOTE: If the patient answers yes to any of the questions below, hold the infusion and contact ordering provider or on-call provider.    1. Have you recently had an elevated temperature, fever, chills, productive cough, coughing for 3 weeks or longer or hemoptysis, abnormal vital signs, night sweats,  chest pain or have you noticed a decrease in your appetite, unexplained weight loss or fatigue? No  2. Do you have any open wounds or new incisions? No  3. Do you have any recent or upcoming hospitalizations, surgeries or dental procedures? No  4. Do you currently have or recently have had any signs of illness or infection or are you on any antibiotics? No  5. Have you had any new, sudden or worsening abdominal pain? No  6. Have you or anyone in your household received a live vaccination in the past 4 weeks? Please note:  No live vaccines while on biologic/chemotherapy until 6 months after the last treatment.  Patient can receive the flu vaccine (shot only) and the pneumovax.  It is optimal for the patient to get these vaccines mid cycle, but they can be given at any time as long as it is not on the day of the infusion. No  7. Have you recently been diagnosed with any new nervous system diseases (ie. Multiple sclerosis, Guillain [place], seizures, neurological changes) or cancer  diagnosis? No  8. Are you on any form of radiation or chemotherapy? No  9. Are you pregnant or breast feeding or do you have plans of pregnancy in the future? No  10. Have you been having any signs of worsening depression or suicidal ideations?  (benlysta only) No  11. Have there been any other new onset medical symptoms? No    Premedications: administered per order.    Drug Waste Record: No    Infusion length and rate:  infusion given over approximately 1 hour    Labs: were drawn per orders.    Vascular access: peripheral IV placed today.    Is the next appt scheduled? No, staff message sent to scheduling team  Asymptomatic COVID test completed? Declined. Getting second COVID vaccine on Friday.    Post Infusion Assessment:  Patient tolerated infusion without incident.  Blood return noted pre and post infusion.  Site patent and intact, free from redness, edema or discomfort.  No evidence of extravasations.  Access discontinued per protocol.     POST-INFUSION OF BIOLOGICAL MEDICATION:  Reviewed with patient.  Given biologic medication or medication hand-out. Inform patient if any fever, chills or signs of infection, new symptoms, abdominal pain, heart palpitations, shortness of breath, reaction, weakness, neurological changes, seek medical attention immediately and should not receive infusions. No live virus vaccines prior to or during treatment or up to 6 months post infusion. If the patient has an upcoming procedure or surgery, this should be discussed with the rheumatologist and surgeon or provider.    Discharge Plan:   Follow up plan of care with: ongoing infusions at [facility].  Discharge instructions were reviewed with patient.  Patient/representative verbalized understanding of discharge instructions and all questions answered.  Patient discharged [facility] in stable condition.    Administrations This Visit     acetaminophen (TYLENOL) tablet 650  "mg     Admin Date  03/01/2021 Action  Given Dose  650 mg Route  Oral Administered By  Jolynn Jimenes RN          belimumab (BENLYSTA) 520 mg in sodium chloride 0.9 % 282 mL INFUSION     Admin Date  03/01/2021 Action  New Bag Dose  520 mg Rate  282 mL/hr Route  Intravenous Administered By  Jolynn Jimenes RN          diphenhydrAMINE (BENADRYL) capsule 25 mg     Admin Date  03/01/2021 Action  Given Dose  25 mg Route  Oral Administered By  Jolynn Jimenes RN          methylPREDNISolone sodium succinate (solu-MEDROL) injection 125 mg     Admin Date  03/01/2021 Action  Given Dose  125 mg Route  Intravenous Administered By  Jolynn Jimenes, GIUSEPPE              Vital signs:  Temp: 98.1  F (36.7  C) Temp src: Oral BP: 90/61(baseline, asymptomatic) Pulse: 97   Resp: 18 SpO2: 99 % O2 Device: None (Room air)     Weight: 52.2 kg (115 lb 1.6 oz)  Estimated body mass index is 18.58 kg/m  as calculated from the following:    Height as of 7/27/20: 1.676 m (5' 6\").    Weight as of this encounter: 52.2 kg (115 lb 1.6 oz).          "

## 2021-03-01 NOTE — LETTER
3/1/2021         RE: Yvonne Barron  [address]  [place] [address]        Dear Colleague,    Thank you for referring your patient, Yvonne Barron, to the [facility]. Please see a copy of my visit note below.    Nursing Note  Yvonne Barron [facility] for:   Chief Complaint   Patient presents with     Infusion     IV Benlysta     During [facility]'s [facility] appointment, orders from Dr. Irby were completed.  Frequency: monthly    Progress note:  Patient identification verified by name and date of birth.  Assessment completed.  Vitals recorded in Doc Flowsheets.  Patient was provided with education regarding medication/procedure and possible side effects.  Patient verbalized understanding.     present during visit today: Not Applicable.    Treatment Conditions: ~~~ NOTE: If the patient answers yes to any of the questions below, hold the infusion and contact ordering provider or on-call provider.    1. Have you recently had an elevated temperature, fever, chills, productive cough, coughing for 3 weeks or longer or hemoptysis, abnormal vital signs, night sweats,  chest pain or have you noticed a decrease in your appetite, unexplained weight loss or fatigue? No  2. Do you have any open wounds or new incisions? No  3. Do you have any recent or upcoming hospitalizations, surgeries or dental procedures? No  4. Do you currently have or recently have had any signs of illness or infection or are you on any antibiotics? No  5. Have you had any new, sudden or worsening abdominal pain? No  6. Have you or anyone in your household received a live vaccination in the past 4 weeks? Please note:  No live vaccines while on biologic/chemotherapy until 6 months after the last treatment.  Patient can receive the flu vaccine (shot only) and the pneumovax.  It is optimal for  the patient to get these vaccines mid cycle, but they can be given at any time as long as it is not on the day of the infusion. No  7. Have you recently been diagnosed with any new nervous system diseases (ie. Multiple sclerosis, Guillain [place], seizures, neurological changes) or cancer diagnosis? No  8. Are you on any form of radiation or chemotherapy? No  9. Are you pregnant or breast feeding or do you have plans of pregnancy in the future? No  10. Have you been having any signs of worsening depression or suicidal ideations?  (benlysta only) No  11. Have there been any other new onset medical symptoms? No    Premedications: administered per order.    Drug Waste Record: No    Infusion length and rate:  infusion given over approximately 1 hour    Labs: were drawn per orders.    Vascular access: peripheral IV placed today.    Is the next appt scheduled? No, staff message sent to scheduling team  Asymptomatic COVID test completed? Declined. Getting second COVID vaccine on Friday.    Post Infusion Assessment:  Patient tolerated infusion without incident.  Blood return noted pre and post infusion.  Site patent and intact, free from redness, edema or discomfort.  No evidence of extravasations.  Access discontinued per protocol.     POST-INFUSION OF BIOLOGICAL MEDICATION:  Reviewed with patient.  Given biologic medication or medication hand-out. Inform patient if any fever, chills or signs of infection, new symptoms, abdominal pain, heart palpitations, shortness of breath, reaction, weakness, neurological changes, seek medical attention immediately and should not receive infusions. No live virus vaccines prior to or during treatment or up to 6 months post infusion. If the patient has an upcoming procedure or surgery, this should be discussed with the rheumatologist and surgeon or provider.    Discharge Plan:   Follow up plan of care with: ongoing infusions at [facility].  Discharge  "instructions were reviewed with patient.  Patient/representative verbalized understanding of discharge instructions and all questions answered.  Patient discharged [facility] in stable condition.    Administrations This Visit     acetaminophen (TYLENOL) tablet 650 mg     Admin Date  03/01/2021 Action  Given Dose  650 mg Route  Oral Administered By  Jolynn Jimenes RN          belimumab (BENLYSTA) 520 mg in sodium chloride 0.9 % 282 mL INFUSION     Admin Date  03/01/2021 Action  New Bag Dose  520 mg Rate  282 mL/hr Route  Intravenous Administered By  Jolynn Jimenes RN          diphenhydrAMINE (BENADRYL) capsule 25 mg     Admin Date  03/01/2021 Action  Given Dose  25 mg Route  Oral Administered By  Jolynn Jimenes RN          methylPREDNISolone sodium succinate (solu-MEDROL) injection 125 mg     Admin Date  03/01/2021 Action  Given Dose  125 mg Route  Intravenous Administered By  Jolynn Jimenes RN              Vital signs:  Temp: 98.1  F (36.7  C) Temp src: Oral BP: 90/61(baseline, asymptomatic) Pulse: 97   Resp: 18 SpO2: 99 % O2 Device: None (Room air)     Weight: 52.2 kg (115 lb 1.6 oz)  Estimated body mass index is 18.58 kg/m  as calculated from the following:    Height as of 7/27/20: 1.676 m (5' 6\").    Weight as of this encounter: 52.2 kg (115 lb 1.6 oz).              Again, thank you for allowing me to participate in the care of your patient.        Sincerely,        [facility]    "

## 2021-03-02 LAB
C3 SERPL-MCNC: 69 MG/DL (ref 81–157)
C4 SERPL-MCNC: 6 MG/DL (ref 13–39)

## 2021-03-08 RX ORDER — ERGOCALCIFEROL 1.25 MG/1
50000 CAPSULE, LIQUID FILLED ORAL
Qty: 12 CAPSULE | Refills: 0 | Status: SHIPPED | OUTPATIENT
Start: 2021-03-08 | End: 2021-06-10

## 2021-03-08 NOTE — RESULT ENCOUNTER NOTE
Stable labs, anti-DNA is improved. I faxed a vitamin D prescription to your pharmacy to replace low vitamin D.

## 2021-03-29 ENCOUNTER — INFUSION THERAPY VISIT (OUTPATIENT)
Dept: INFUSION THERAPY | Facility: CLINIC | Age: 26
End: 2021-03-29
Attending: INTERNAL MEDICINE
Payer: COMMERCIAL

## 2021-03-29 VITALS
RESPIRATION RATE: 18 BRPM | DIASTOLIC BLOOD PRESSURE: 66 MMHG | WEIGHT: 115.7 LBS | SYSTOLIC BLOOD PRESSURE: 100 MMHG | HEART RATE: 116 BPM | OXYGEN SATURATION: 99 % | TEMPERATURE: 98.7 F | BODY MASS INDEX: 18.67 KG/M2

## 2021-03-29 DIAGNOSIS — M32.9 SYSTEMIC LUPUS ERYTHEMATOSUS, UNSPECIFIED SLE TYPE, UNSPECIFIED ORGAN INVOLVEMENT STATUS (H): Primary | ICD-10-CM

## 2021-03-29 PROCEDURE — 250N000013 HC RX MED GY IP 250 OP 250 PS 637: Performed by: INTERNAL MEDICINE

## 2021-03-29 PROCEDURE — 258N000003 HC RX IP 258 OP 636: Performed by: INTERNAL MEDICINE

## 2021-03-29 PROCEDURE — 96375 TX/PRO/DX INJ NEW DRUG ADDON: CPT

## 2021-03-29 PROCEDURE — 96365 THER/PROPH/DIAG IV INF INIT: CPT

## 2021-03-29 PROCEDURE — 250N000011 HC RX IP 250 OP 636: Performed by: INTERNAL MEDICINE

## 2021-03-29 RX ORDER — DIPHENHYDRAMINE HCL 25 MG
25 CAPSULE ORAL ONCE
Status: CANCELLED
Start: 2021-03-30

## 2021-03-29 RX ORDER — ACETAMINOPHEN 325 MG/1
650 TABLET ORAL ONCE
Status: CANCELLED
Start: 2021-03-30

## 2021-03-29 RX ORDER — METHYLPREDNISOLONE SODIUM SUCCINATE 125 MG/2ML
125 INJECTION, POWDER, LYOPHILIZED, FOR SOLUTION INTRAMUSCULAR; INTRAVENOUS ONCE
Status: COMPLETED | OUTPATIENT
Start: 2021-03-29 | End: 2021-03-29

## 2021-03-29 RX ORDER — METHYLPREDNISOLONE SODIUM SUCCINATE 125 MG/2ML
125 INJECTION, POWDER, LYOPHILIZED, FOR SOLUTION INTRAMUSCULAR; INTRAVENOUS ONCE
Status: CANCELLED | OUTPATIENT
Start: 2021-03-30

## 2021-03-29 RX ORDER — HEPARIN SODIUM,PORCINE 10 UNIT/ML
5 VIAL (ML) INTRAVENOUS
Status: CANCELLED | OUTPATIENT
Start: 2021-03-30

## 2021-03-29 RX ORDER — HEPARIN SODIUM (PORCINE) LOCK FLUSH IV SOLN 100 UNIT/ML 100 UNIT/ML
5 SOLUTION INTRAVENOUS
Status: CANCELLED | OUTPATIENT
Start: 2021-03-30

## 2021-03-29 RX ORDER — ACETAMINOPHEN 325 MG/1
650 TABLET ORAL ONCE
Status: COMPLETED | OUTPATIENT
Start: 2021-03-29 | End: 2021-03-29

## 2021-03-29 RX ORDER — DIPHENHYDRAMINE HCL 25 MG
25 CAPSULE ORAL ONCE
Status: COMPLETED | OUTPATIENT
Start: 2021-03-29 | End: 2021-03-29

## 2021-03-29 RX ADMIN — METHYLPREDNISOLONE SODIUM SUCCINATE 125 MG: 125 INJECTION, POWDER, FOR SOLUTION INTRAMUSCULAR; INTRAVENOUS at 11:35

## 2021-03-29 RX ADMIN — ACETAMINOPHEN 650 MG: 325 TABLET ORAL at 11:35

## 2021-03-29 RX ADMIN — DIPHENHYDRAMINE HYDROCHLORIDE 25 MG: 25 CAPSULE ORAL at 11:35

## 2021-03-29 RX ADMIN — BELIMUMAB 520 MG: 400 INJECTION, POWDER, LYOPHILIZED, FOR SOLUTION INTRAVENOUS at 11:58

## 2021-03-29 NOTE — LETTER
3/29/2021         RE: Yvonne Barron  [address]  [place] [address]        Dear Colleague,    Thank you for referring your patient, Yvonne Barron, to the [facility]. Please see a copy of my visit note below.    Nursing Note  Yvonne Barron [facility] for:   Chief Complaint   Patient presents with     Infusion     belimumab (BENLYSTA)         During [facility]'s [facility] appointment, orders from Dr. Irby were completed.  Frequency: monthly    Progress note:  Patient identification verified by name and date of birth.  Assessment completed.  Vitals recorded in Doc Flowsheets.  Patient was provided with education regarding medication/procedure and possible side effects.  Patient verbalized understanding.     present during visit today: Not Applicable.    Treatment Conditions: ~~~ NOTE: If the patient answers yes to any of the questions below, hold the infusion and contact ordering provider or on-call provider.    1. Have you recently had an elevated temperature, fever, chills, productive cough, coughing for 3 weeks or longer or hemoptysis, abnormal vital signs, night sweats,  chest pain or have you noticed a decrease in your appetite, unexplained weight loss or fatigue? No  2. Do you have any open wounds or new incisions? No  3. Do you have any recent or upcoming hospitalizations, surgeries or dental procedures? No  4. Do you currently have or recently have had any signs of illness or infection or are you on any antibiotics? No  5. Have you had any new, sudden or worsening abdominal pain? No  6. Have you or anyone in your household received a live vaccination in the past 4 weeks? Please note:  No live vaccines while on biologic/chemotherapy until 6 months after the last treatment.  Patient can receive the flu vaccine (shot only) and the pneumovax.  It  is optimal for the patient to get these vaccines mid cycle, but they can be given at any time as long as it is not on the day of the infusion. No  7. Have you recently been diagnosed with any new nervous system diseases (ie. Multiple sclerosis, Guillain [place], seizures, neurological changes) or cancer diagnosis? No  8. Are you on any form of radiation or chemotherapy? No  9. Are you pregnant or breast feeding or do you have plans of pregnancy in the future? No  10. Have you been having any signs of worsening depression or suicidal ideations?  (benlysta only) No  11. Have there been any other new onset medical symptoms? No    Premedications: administered per order.    Drug Waste Record: No    Infusion length and rate:  infusion given over approximately 1 hour    Labs: were drawn per orders.    Vascular access: peripheral IV placed today.    Is the next appt scheduled? No, staff message sent to scheduling team  Asymptomatic COVID test completed? Declined.     Post Infusion Assessment:  Patient tolerated infusion without incident.  Blood return noted pre and post infusion.  Site patent and intact, free from redness, edema or discomfort.  No evidence of extravasations.  Access discontinued per protocol.     POST-INFUSION OF BIOLOGICAL MEDICATION:  Reviewed with patient.  Given biologic medication or medication hand-out. Inform patient if any fever, chills or signs of infection, new symptoms, abdominal pain, heart palpitations, shortness of breath, reaction, weakness, neurological changes, seek medical attention immediately and should not receive infusions. No live virus vaccines prior to or during treatment or up to 6 months post infusion. If the patient has an upcoming procedure or surgery, this should be discussed with the rheumatologist and surgeon or provider.    Discharge Plan:   Follow up plan of care with: ongoing infusions at [facility].  Discharge instructions were reviewed with  patient.  Patient/representative verbalized understanding of discharge instructions and all questions answered.  Patient discharged [facility] in stable condition.    Declined AVS.    Ayse Conteh RN    Administrations This Visit     acetaminophen (TYLENOL) tablet 650 mg     Admin Date  03/29/2021 Action  Given Dose  650 mg Route  Oral Administered By  Ayse Conteh RN          belimumab (BENLYSTA) 520 mg in sodium chloride 0.9 % 282 mL INFUSION     Admin Date  03/29/2021 Action  New Bag Dose  520 mg Rate  282 mL/hr Route  Intravenous Administered By  Ayse Conteh RN          diphenhydrAMINE (BENADRYL) capsule 25 mg     Admin Date  03/29/2021 Action  Given Dose  25 mg Route  Oral Administered By  Ayse Conteh RN          methylPREDNISolone sodium succinate (solu-MEDROL) injection 125 mg     Admin Date  03/29/2021 Action  Given Dose  125 mg Route  Intravenous Administered By  Ayse Conteh RN                /66   Pulse 116   Temp 98.7  F (37.1  C) (Oral)   Resp 18   Wt 52.5 kg (115 lb 11.2 oz)   SpO2 99%   BMI 18.67 kg/m            Again, thank you for allowing me to participate in the care of your patient.        Sincerely,        [facility]

## 2021-03-29 NOTE — PROGRESS NOTES
Nursing Note  Yvonne SERRANO Anderson presents today to [facility] for:   Chief Complaint   Patient presents with     Infusion     belimumab (BENLYSTA)         During today's [facility] appointment, orders from Dr. Irby were completed.  Frequency: monthly    Progress note:  Patient identification verified by name and date of birth.  Assessment completed.  Vitals recorded in Doc Flowsheets.  Patient was provided with education regarding medication/procedure and possible side effects.  Patient verbalized understanding.     present during visit today: Not Applicable.    Treatment Conditions: ~~~ NOTE: If the patient answers yes to any of the questions below, hold the infusion and contact ordering provider or on-call provider.    1. Have you recently had an elevated temperature, fever, chills, productive cough, coughing for 3 weeks or longer or hemoptysis, abnormal vital signs, night sweats,  chest pain or have you noticed a decrease in your appetite, unexplained weight loss or fatigue? No  2. Do you have any open wounds or new incisions? No  3. Do you have any recent or upcoming hospitalizations, surgeries or dental procedures? No  4. Do you currently have or recently have had any signs of illness or infection or are you on any antibiotics? No  5. Have you had any new, sudden or worsening abdominal pain? No  6. Have you or anyone in your household received a live vaccination in the past 4 weeks? Please note:  No live vaccines while on biologic/chemotherapy until 6 months after the last treatment.  Patient can receive the flu vaccine (shot only) and the pneumovax.  It is optimal for the patient to get these vaccines mid cycle, but they can be given at any time as long as it is not on the day of the infusion. No  7. Have you recently been diagnosed with any new nervous system diseases (ie. Multiple sclerosis, Guillain [place], seizures, neurological changes)  or cancer diagnosis? No  8. Are you on any form of radiation or chemotherapy? No  9. Are you pregnant or breast feeding or do you have plans of pregnancy in the future? No  10. Have you been having any signs of worsening depression or suicidal ideations?  (benlysta only) No  11. Have there been any other new onset medical symptoms? No    Premedications: administered per order.    Drug Waste Record: No    Infusion length and rate:  infusion given over approximately 1 hour    Labs: were drawn per orders.    Vascular access: peripheral IV placed today.    Is the next appt scheduled? No, staff message sent to scheduling team  Asymptomatic COVID test completed? Declined.     Post Infusion Assessment:  Patient tolerated infusion without incident.  Blood return noted pre and post infusion.  Site patent and intact, free from redness, edema or discomfort.  No evidence of extravasations.  Access discontinued per protocol.     POST-INFUSION OF BIOLOGICAL MEDICATION:  Reviewed with patient.  Given biologic medication or medication hand-out. Inform patient if any fever, chills or signs of infection, new symptoms, abdominal pain, heart palpitations, shortness of breath, reaction, weakness, neurological changes, seek medical attention immediately and should not receive infusions. No live virus vaccines prior to or during treatment or up to 6 months post infusion. If the patient has an upcoming procedure or surgery, this should be discussed with the rheumatologist and surgeon or provider.    Discharge Plan:   Follow up plan of care with: ongoing infusions at [facility].  Discharge instructions were reviewed with patient.  Patient/representative verbalized understanding of discharge instructions and all questions answered.  Patient discharged from [facility] in stable condition.    Declined AVS.    Ayse Conteh RN    Administrations This Visit     acetaminophen  (TYLENOL) tablet 650 mg     Admin Date  03/29/2021 Action  Given Dose  650 mg Route  Oral Administered By  Ayse Conteh RN          belimumab (BENLYSTA) 520 mg in sodium chloride 0.9 % 282 mL INFUSION     Admin Date  03/29/2021 Action  New Bag Dose  520 mg Rate  282 mL/hr Route  Intravenous Administered By  Ayse Conteh RN          diphenhydrAMINE (BENADRYL) capsule 25 mg     Admin Date  03/29/2021 Action  Given Dose  25 mg Route  Oral Administered By  Ayse Conteh RN          methylPREDNISolone sodium succinate (solu-MEDROL) injection 125 mg     Admin Date  03/29/2021 Action  Given Dose  125 mg Route  Intravenous Administered By  Ayse Conteh RN                /66   Pulse 116   Temp 98.7  F (37.1  C) (Oral)   Resp 18   Wt 52.5 kg (115 lb 11.2 oz)   SpO2 99%   BMI 18.67 kg/m

## 2021-04-08 ENCOUNTER — OFFICE VISIT (OUTPATIENT)
Dept: DERMATOLOGY | Facility: CLINIC | Age: 26
End: 2021-04-08
Payer: COMMERCIAL

## 2021-04-08 VITALS — HEART RATE: 89 BPM | SYSTOLIC BLOOD PRESSURE: 91 MMHG | DIASTOLIC BLOOD PRESSURE: 57 MMHG

## 2021-04-08 DIAGNOSIS — L81.9 HYPERPIGMENTATION: Primary | ICD-10-CM

## 2021-04-08 PROCEDURE — 99214 OFFICE O/P EST MOD 30 MIN: CPT | Performed by: DERMATOLOGY

## 2021-04-08 RX ORDER — HYDROCORTISONE 25 MG/G
CREAM TOPICAL EVERY MORNING
Qty: 30 G | Refills: 3 | Status: SHIPPED | OUTPATIENT
Start: 2021-04-08

## 2021-04-08 ASSESSMENT — PAIN SCALES - GENERAL: PAINLEVEL: NO PAIN (0)

## 2021-04-08 NOTE — NURSING NOTE
"Chief Complaint   Patient presents with     Derm Problem     lupus follow up, thinks the creams are helping, however there are some dark patches with \"white spots\"      Micki Mckeon, EMT    "

## 2021-04-08 NOTE — LETTER
4/8/2021       RE: Yvonne Barron  [address]  [place] [address]     Dear Colleague,    Thank you for referring your patient, Yvonne Barron, to the [facility] [place] at [facility]. Please see a copy of my visit note below.    [facility] Dermatology Note        Dermatology Problem List:  1) Chilblains Lupus Erythematosus   2) Systemic lupus erythematosus   3) Graves disease s/p radioactive iodine ablation in 2013, post-ablation hypothyroidism   4) Unintentional weight loss  5) Plaquenil hyperpigmentation vs melasma     Encounter Date: April 8, 2021  Sep 24, 2020     CC: f/u cutaneous lupus erythematosus and hyperpigmentatuion       Interval Hx 4/8/21  Since I sqw Yvonne last  - She noted improvement in hyperpigemntation with hyroquinone and tretinoin. She took a break from the hydroquinone which she stopped 1.5 mo ago now. Srtill using tretinoin nightly.  No issues wth the CHillblains LE. She is now gaining weight back we she thinks was likely due to cellcept. She has acked down and cellcepta nd switched to benlysta which has been very helpful for her.       History of Present Illness 9/24/20  Ms. Yvonne Barron is a 25 year old female who presents as a follow-up for SLE and Chilblains SLE. The patient was last seen 4/30/20 when the patient was prescribed hydroquinone x 3 months for hyperpigmentation.   The patient states that she is losing weight and she is concerned about this. She has a history of Graves s/p radioactive iodine ablation and now is on levothyroxine supplementation. Endocrinology did not feel her weight loss was due to endocrinologic issue and thought it may be due to her SLE.  The patient states she weighs around 110 (last year was 165 lbs). GI had seen the patient a couple of months ago and ordered CT CAP but the patient has not gone to get this and she states she will  Physical Therapy Treatment    Patient Name:  Sheryl Martines   MRN:  [id number]  Admitting Diagnosis: Somnolence  Recent Surgery: Procedure(s) (LRB):  LAPAROTOMY, EXPLORATORY (N/A)  INCISION AND DRAINAGE, ABSCESS-  drainage of pelvic abscess (N/A)  EXCISION, ABSCESS- drainage abdominal wall abscess (N/A)  APPLICATION, WOUND VAC (N/A) 31 Days Post-Op    Recommendations:     Discharge Recommendations:  nursing facility, skilled   Discharge Equipment Recommendations: wheelchair, manual, hospital bed   Barriers to discharge: Decreased caregiver support    Plan:     During this hospitalization, patient to be seen 3 x/week to address the listed problems via gait training, therapeutic activities, therapeutic exercises, neuromuscular re-education  · Plan of Care Expires:  03/24/19   Plan of Care Reviewed with: patient, daughter    Assessment:     Sheryl Martines is a 61 y.o. female admitted with a medical diagnosis of Somnolence.   Patient is making progress towards goals, participating well in this session. Pt continues to require significant assist with all functional mobility and postural control. Sheryl Hernandezs deficits effect their ability to safely and independently participate in self-care tasks and functional mobility which increases their caregiver burden. Due to her physical therapy diagnosis of debility and deconditioning, they continue to benefit from acute PT services to address the following limitations: high fall risk, weakness, instability, the need for supervised instruction of exercise. Sheryl Hernandezs deficits effect their roles and responsibilities in which they were able to complete prior to admit. Education was provided to patient & family regarding importance of continued participation in therapy, patient's progress and discharge disposition. Pt would benefit from a collaborative PT/OT/SLP program to improve quality of life and focus on recovery of impairments.     Problem List: weakness, gait  "instability, impaired endurance, impaired balance, impaired self care skills, impaired functional mobilty, abnormal tone, edema, impaired skin, decreased safety awareness, impaired cardiopulmonary response to activity.   Rehab Prognosis: Fair     GOALS:   Multidisciplinary Problems     Physical Therapy Goals        Problem: Physical Therapy Goal    Goal Priority Disciplines Outcome Goal Variances Interventions   Physical Therapy Goal     PT, PT/OT Ongoing (interventions implemented as appropriate)     Description:  Goals to be met by:3/8/19    Patient will increase functional independence with mobility by performin. Supine to sit with Moderate Assistance -not met  2. Sit to stand transfer with Maximum Assistance -not met  3. Bed to chair transfer with Maximum Assistance -not met  4. Sitting at edge of bed x10 minutes with Contact Guard Assistance - not met  5. Lower extremity exercise program x10 reps, with assistance as needed - not met                          Subjective     Communicated with RN prior to session.  Patient found supine upon PT entry to room. Pt's daughter present during session.  "I'll try. My legs hurt"    Pain/Comfort:  · Pain Rating 1: 7/10  · Location - Side 1: Bilateral  · Location - Orientation 1: generalized  · Location 1: leg  · Pain Addressed 1: Pre-medicate for activity, Reposition, Cessation of Activity, Distraction  · Pain Rating Post-Intervention 1: 710    Objective:     Patient found with: telemetry, PICC line, bowel management system, blood pressure cuff, pressure relief boots, nieves catheter(G-tube)   Mental Status: Patient is oriented to AxOx4 and follows all verbal, single-step commands. Patient is Alert and Cooperative during session.    General Precautions: Standard, Cardiac fall, NPO, aspiration   Orthopedic Precautions:N/A   Braces: N/A   Body mass index is 35.12 kg/m².  Respiratory Status: nasal cannula 3L  Vital Signs (Most Recent):    Temp: 98.3 °F (36.8 °C) " "need to schedule this. Has poor appetite and eats only 1x/day. Drinks Ensure in the evening.  The patient states that she has muscle pains on her legs as well. Denies joint pains.  She also feels like her Raynaud's is \"coming back.\" The patient has been getting whiet color change on her fingers. Had sildenafil in Spring because things were under control. Had been on CCB prior which didn't really help.  Also asking if she can restart hydroquinone which helped with her hyperpigmentation on the forehead. Has been off of it for several months.  Taking Plaquenil as well, on 400 mg MWF and 200 mg the rest of the week (dose was adjusted due to the weight loss). On CellCept 1500 mg BID.  At her last Rheumatology visit with Dr. Torres, urine protein and ESR were better, but C3/4/WBC (1.4) were worse. dsDNA was elevated at 31 (prior was 33).     Past Medical History:   There is no problem list on file for this patient.     Past Medical History        Past Medical History:   Diagnosis Date     Hypothyroidism       Lupus (systemic lupus erythematosus) (H)           Past Surgical History   No past surgical history on file.        Social History:  Patient reports that she has never smoked. She has never used smokeless tobacco. She reports that she does not drink alcohol or use drugs.     Family History:  Family History   No family history on file.        Medications:  Current Outpatient Prescriptions          Current Outpatient Medications   Medication Sig Dispense Refill     hydroquinone (CRISTA) 4 % external cream Apply to hyperpigmented areas twice a day 30 g 3     hydroxychloroquine (PLAQUENIL) 200 MG tablet 200 mg bid on Mon/Wed/Fri and 200 mg a day the rest of the week 135 tablet 0     levothyroxine (SYNTHROID/LEVOTHROID) 75 MCG tablet Take 75 mcg by mouth daily         mycophenolate (GENERIC EQUIVALENT) 500 MG tablet Take 3 tablets (1,500 mg) by mouth 2 times daily 180 tablet 3     tiZANidine (ZANAFLEX) 4 MG tablet Take 1 " (02/25/19 1100)  Pulse: 100 (02/25/19 1100)  Resp: 15 (02/25/19 1100)  BP: 133/89 (02/25/19 1100)  SpO2: 100 % (02/25/19 1100)    Functional Mobility:  Rehab tech present: yes  Bed Mobility:   · Rolling/Turning to Left: moderate assistance  · Scooting to HOB via supine bridge: dependent and 2 persons via drawsheet  · Supine to Sit: total assistance and 2 persons; from left side of bed  · Scooting anteriorly to EOB to have both feet planted on floor: total assistance  · Sit to Supine: total assistance and 2 persons; to left side of bed    Sitting Balance at Edge of Bed:   Assistance Level Required: Total Assistance   Time: 8 minutes   Postural deviations noted: left lateral lean, PPT, cervical flexion, eyes closed   Encouraged: BUE and BLE weight bearing for support, abdominal contraction for upright posture   Comments: pt fatiguing quickly with increased left lateral lean, able to actively engage to correct posture, unable to maintain.      Transfers:   NT due to poor posture and static balance.    Therapeutic Activities & Exercises:   Education:  Patient provided with daily orientation and goals of this PT session. Patient and family agreed to participate in session. Patient and family aware of patient's deficits and therapy progression. They were educated to transfer to bedside chair/bedside commode/bathroom with RN/PCT present. Encouraged patient to perform daily exercises & mobility to increase endurance and decrease effects of bedrest.Time provided for therapeutic counseling and discussion of health disposition. All questions answered to patient's and family's satisfaction, within scope of PT practice; voiced no other concerns. White board updated in patient's room, RN notified of session.    Patient left supine, with head in midline, neutral pelvis & heels floated for skin protection with all lines intact, call button in reach and RN notified    AM-PAC 6 CLICK MOBILITY  Turning over in bed (including adjusting  tablet (4 mg) by mouth 3 times daily as needed for muscle spasms 60 tablet 0     gabapentin (NEURONTIN) 300 MG capsule Increase by 300mg every 3 days up to 600mg three times daily as needed (Patient not taking: Reported on 9/24/2020) 90 capsule 3     hydroquinone (CRISTA) 4 % external cream Apply to face BID for 3 months then take 3 month break.         sildenafil (REVATIO) 20 MG tablet Take 1 tablet (20 mg) by mouth 3 times daily 90 tablet 1     tacrolimus (PROTOPIC) 0.1 % external ointment Apply to facial rash twice a day (Patient not taking: Reported on 6/25/2020) 60 g 3                       No Known Allergies        Review of Systems:  -As per HPI  -Constitutional: Otherwise feeling well today, in usual state of health.     Physical exam:  BP 91/57 (BP Location: Left arm)   Pulse 89   GEN: This is a well developed, well-nourished female in no acute distress, in a pleasant mood.    Lymph: scattered, shotty LAD on the lateral neck; no supraclavicular, axillary, inguinal or popliteal LAD  MSK: No evidence of joint inflammation; shins tender to palpation  SKIN: Focused examination of the hands, b/l lower extremities and forehead was performed.  -Fraser skin type: V  -Bitemporal forehead with symmetric hyperpigmented patches  -No discoloration of distal fingers today; no joint deformity  -No other lesions of concern on areas examined.      Impression/Plan:  1. SLE with Chilblain's LE and Raynaud's  - Well controlled on cellcept 500mg BID and benlysta. Will likely be abnle top stop cellcept. Rheum managing.  - Not needing sildenafil    2. Progressive weight loss  - Clair 2/2 cellcept   - Currently on lower dose and doing well.      3. Lower extremity muscle aches  -Still gets intermittent pain in legs but better    4. Hyperpigmented patches on forehead  - Can restart hydroquinone BID for 3 months with aggressive sun screen use  - Tretinoin cream at night  - In addition would start hydrocortisone cream every  bedclothes, sheets and blankets)?: 1  Sitting down on and standing up from a chair with arms (e.g., wheelchair, bedside commode, etc.): 1  Moving from lying on back to sitting on the side of the bed?: 1  Moving to and from a bed to a chair (including a wheelchair)?: 1  Need to walk in hospital room?: 1  Climbing 3-5 steps with a railing?: 1  Basic Mobility Total Score: 6     Time Tracking:     PT Received On: 02/25/19  PT Start Time: 0856     PT Stop Time: 0928  PT Total Time (min): 32 min     Billable Minutes:   · Therapeutic Activity 32    Treatment Type: Treatment  PT/PTA: PT       Ese Harp PT, DPT  02/25/2019   Pager: [phone]     other day     RTC PRN    Chico Quinonez MD, FAAD    Departments of Internal Medicine and Dermatology  [facility]  [phone]

## 2021-04-08 NOTE — PATIENT INSTRUCTIONS
Restart hydroquinone for 3 months and then take a month break  Wear daily sunscreen >30 SPF  Continue tretinoin cream at night  Hydrocortisone 2.5% cream every other day on dark spots in morning

## 2021-04-08 NOTE — PROGRESS NOTES
"[facility] Dermatology Note        Dermatology Problem List:  1) Chilblains Lupus Erythematosus   2) Systemic lupus erythematosus   3) Graves disease s/p radioactive iodine ablation in 2013, post-ablation hypothyroidism   4) Unintentional weight loss  5) Plaquenil hyperpigmentation vs melasma     Encounter Date: April 8, 2021  Sep 24, 2020     CC: f/u cutaneous lupus erythematosus and hyperpigmentatuion       Interval Hx 4/8/21  Since I sqw Yvonne last  - She noted improvement in hyperpigemntation with hyroquinone and tretinoin. She took a break from the hydroquinone which she stopped 1.5 mo ago now. Srtill using tretinoin nightly.  No issues wth the CHillblains LE. She is now gaining weight back we she thinks was likely due to cellcept. She has acked down and cellcepta nd switched to benlysta which has been very helpful for her.       History of Present Illness 9/24/20  Ms. Yvonne Barron is a 25 year old female who presents as a follow-up for SLE and Chilblains SLE. The patient was last seen 4/30/20 when the patient was prescribed hydroquinone x 3 months for hyperpigmentation.   The patient states that she is losing weight and she is concerned about this. She has a history of Graves s/p radioactive iodine ablation and now is on levothyroxine supplementation. Endocrinology did not feel her weight loss was due to endocrinologic issue and thought it may be due to her SLE.  The patient states she weighs around 110 (last year was 165 lbs). GI had seen the patient a couple of months ago and ordered CT CAP but the patient has not gone to get this and she states she will need to schedule this. Has poor appetite and eats only 1x/day. Drinks Ensure in the evening.  The patient states that she has muscle pains on her legs as well. Denies joint pains.  She also feels like her Raynaud's is \"coming back.\" The patient has been getting whiet color change on her fingers. Had sildenafil in Spring because " things were under control. Had been on CCB prior which didn't really help.  Also asking if she can restart hydroquinone which helped with her hyperpigmentation on the forehead. Has been off of it for several months.  Taking Plaquenil as well, on 400 mg MWF and 200 mg the rest of the week (dose was adjusted due to the weight loss). On CellCept 1500 mg BID.  At her last Rheumatology visit with Dr. Torres, urine protein and ESR were better, but C3/4/WBC (1.4) were worse. dsDNA was elevated at 31 (prior was 33).     Past Medical History:   There is no problem list on file for this patient.     Past Medical History        Past Medical History:   Diagnosis Date     Hypothyroidism       Lupus (systemic lupus erythematosus) (H)           Past Surgical History   No past surgical history on file.        Social History:  Patient reports that she has never smoked. She has never used smokeless tobacco. She reports that she does not drink alcohol or use drugs.     Family History:  Family History   No family history on file.        Medications:  Current Outpatient Prescriptions          Current Outpatient Medications   Medication Sig Dispense Refill     hydroquinone (CRISTA) 4 % external cream Apply to hyperpigmented areas twice a day 30 g 3     hydroxychloroquine (PLAQUENIL) 200 MG tablet 200 mg bid on Mon/Wed/Fri and 200 mg a day the rest of the week 135 tablet 0     levothyroxine (SYNTHROID/LEVOTHROID) 75 MCG tablet Take 75 mcg by mouth daily         mycophenolate (GENERIC EQUIVALENT) 500 MG tablet Take 3 tablets (1,500 mg) by mouth 2 times daily 180 tablet 3     tiZANidine (ZANAFLEX) 4 MG tablet Take 1 tablet (4 mg) by mouth 3 times daily as needed for muscle spasms 60 tablet 0     gabapentin (NEURONTIN) 300 MG capsule Increase by 300mg every 3 days up to 600mg three times daily as needed (Patient not taking: Reported on 9/24/2020) 90 capsule 3     hydroquinone (CRISTA) 4 % external cream Apply to face BID for 3 months then  take 3 month break.         sildenafil (REVATIO) 20 MG tablet Take 1 tablet (20 mg) by mouth 3 times daily 90 tablet 1     tacrolimus (PROTOPIC) 0.1 % external ointment Apply to facial rash twice a day (Patient not taking: Reported on 6/25/2020) 60 g 3                       No Known Allergies        Review of Systems:  -As per HPI  -Constitutional: Otherwise feeling well today, in usual state of health.     Physical exam:  BP 91/57 (BP Location: Left arm)   Pulse 89   GEN: This is a well developed, well-nourished female in no acute distress, in a pleasant mood.    Lymph: scattered, shotty LAD on the lateral neck; no supraclavicular, axillary, inguinal or popliteal LAD  MSK: No evidence of joint inflammation; shins tender to palpation  SKIN: Focused examination of the hands, b/l lower extremities and forehead was performed.  -Fraser skin type: V  -Bitemporal forehead with symmetric hyperpigmented patches  -No discoloration of distal fingers today; no joint deformity  -No other lesions of concern on areas examined.      Impression/Plan:  1. SLE with Chilblain's LE and Raynaud's  - Well controlled on cellcept 500mg BID and benlysta. Will likely be abnle top stop cellcept. Rheum managing.  - Not needing sildenafil    2. Progressive weight loss  - Likley 2/2 cellcept   - Currently on lower dose and doing well.      3. Lower extremity muscle aches  -Still gets intermittent pain in legs but better    4. Hyperpigmented patches on forehead  - Can restart hydroquinone BID for 3 months with aggressive sun screen use  - Tretinoin cream at night  - In addition would start hydrocortisone cream every other day     RTC PRN    Chico Quinonez MD, FAAD    Departments of Internal Medicine and Dermatology  [facility]  [phone]

## 2021-04-19 RX ORDER — TRETINOIN 0.25 MG/G
CREAM TOPICAL
Qty: 45 G | Refills: 7 | Status: SHIPPED | OUTPATIENT
Start: 2021-04-19

## 2021-04-25 ENCOUNTER — HEALTH MAINTENANCE LETTER (OUTPATIENT)
Age: 26
End: 2021-04-25

## 2021-04-26 ENCOUNTER — INFUSION THERAPY VISIT (OUTPATIENT)
Dept: INFUSION THERAPY | Facility: CLINIC | Age: 26
End: 2021-04-26
Attending: INTERNAL MEDICINE
Payer: COMMERCIAL

## 2021-04-26 VITALS
OXYGEN SATURATION: 100 % | HEART RATE: 90 BPM | WEIGHT: 116.8 LBS | BODY MASS INDEX: 18.85 KG/M2 | TEMPERATURE: 99 F | DIASTOLIC BLOOD PRESSURE: 61 MMHG | SYSTOLIC BLOOD PRESSURE: 94 MMHG | RESPIRATION RATE: 16 BRPM

## 2021-04-26 DIAGNOSIS — M32.9 SYSTEMIC LUPUS ERYTHEMATOSUS, UNSPECIFIED SLE TYPE, UNSPECIFIED ORGAN INVOLVEMENT STATUS (H): Primary | ICD-10-CM

## 2021-04-26 PROCEDURE — 250N000011 HC RX IP 250 OP 636: Performed by: INTERNAL MEDICINE

## 2021-04-26 PROCEDURE — 250N000013 HC RX MED GY IP 250 OP 250 PS 637: Performed by: INTERNAL MEDICINE

## 2021-04-26 PROCEDURE — 96365 THER/PROPH/DIAG IV INF INIT: CPT

## 2021-04-26 PROCEDURE — 96375 TX/PRO/DX INJ NEW DRUG ADDON: CPT

## 2021-04-26 PROCEDURE — 258N000003 HC RX IP 258 OP 636: Performed by: INTERNAL MEDICINE

## 2021-04-26 RX ORDER — HEPARIN SODIUM (PORCINE) LOCK FLUSH IV SOLN 100 UNIT/ML 100 UNIT/ML
5 SOLUTION INTRAVENOUS
Status: CANCELLED | OUTPATIENT
Start: 2021-04-27

## 2021-04-26 RX ORDER — DIPHENHYDRAMINE HCL 25 MG
25 CAPSULE ORAL ONCE
Status: COMPLETED | OUTPATIENT
Start: 2021-04-26 | End: 2021-04-26

## 2021-04-26 RX ORDER — HEPARIN SODIUM,PORCINE 10 UNIT/ML
5 VIAL (ML) INTRAVENOUS
Status: CANCELLED | OUTPATIENT
Start: 2021-04-27

## 2021-04-26 RX ORDER — ACETAMINOPHEN 325 MG/1
650 TABLET ORAL ONCE
Status: COMPLETED | OUTPATIENT
Start: 2021-04-26 | End: 2021-04-26

## 2021-04-26 RX ORDER — METHYLPREDNISOLONE SODIUM SUCCINATE 125 MG/2ML
125 INJECTION, POWDER, LYOPHILIZED, FOR SOLUTION INTRAMUSCULAR; INTRAVENOUS ONCE
Status: CANCELLED | OUTPATIENT
Start: 2021-04-27

## 2021-04-26 RX ORDER — ACETAMINOPHEN 325 MG/1
650 TABLET ORAL ONCE
Status: CANCELLED
Start: 2021-04-27

## 2021-04-26 RX ORDER — DIPHENHYDRAMINE HCL 25 MG
25 CAPSULE ORAL ONCE
Status: CANCELLED
Start: 2021-04-27

## 2021-04-26 RX ORDER — METHYLPREDNISOLONE SODIUM SUCCINATE 125 MG/2ML
125 INJECTION, POWDER, LYOPHILIZED, FOR SOLUTION INTRAMUSCULAR; INTRAVENOUS ONCE
Status: COMPLETED | OUTPATIENT
Start: 2021-04-26 | End: 2021-04-26

## 2021-04-26 RX ADMIN — METHYLPREDNISOLONE SODIUM SUCCINATE 125 MG: 125 INJECTION, POWDER, FOR SOLUTION INTRAMUSCULAR; INTRAVENOUS at 16:25

## 2021-04-26 RX ADMIN — DIPHENHYDRAMINE HYDROCHLORIDE 25 MG: 25 CAPSULE ORAL at 16:25

## 2021-04-26 RX ADMIN — BELIMUMAB 520 MG: 400 INJECTION, POWDER, LYOPHILIZED, FOR SOLUTION INTRAVENOUS at 16:37

## 2021-04-26 RX ADMIN — ACETAMINOPHEN 650 MG: 325 TABLET ORAL at 16:25

## 2021-04-26 NOTE — PROGRESS NOTES
Nursing Note  Yvonne Barron presents today to [facility] for: Nicklysrobert  During today's [facility] appointment, orders from Dr Irby were completed.  Frequency: every 28 days    Progress note:  Patient identification verified by name and date of birth.  Assessment completed.  Vitals recorded in Doc Flowsheets.  Patient was provided with education regarding medication/procedure and possible side effects.  Patient verbalized understanding.     present during visit today: Not Applicable.    Treatment Conditions: ~~~ NOTE: If the patient answers yes to any of the questions below, hold the infusion and contact ordering provider or on-call provider.    1. Have you recently had an elevated temperature, fever, chills, productive cough, coughing for 3 weeks or longer or hemoptysis, abnormal vital signs, night sweats,  chest pain or have you noticed a decrease in your appetite, unexplained weight loss or fatigue? No  2. Do you have any open wounds or new incisions? No  3. Do you have any recent or upcoming hospitalizations, surgeries or dental procedures? No  4. Do you currently have or recently have had any signs of illness or infection or are you on any antibiotics? No  5. Have you had any new, sudden or worsening abdominal pain? No  6. Have you or anyone in your household received a live vaccination in the past 4 weeks? Please note:  No live vaccines while on biologic/chemotherapy until 6 months after the last treatment.  Patient can receive the flu vaccine (shot only) and the pneumovax.  It is optimal for the patient to get these vaccines mid cycle, but they can be given at any time as long as it is not on the day of the infusion. No  7. Have you recently been diagnosed with any new nervous system diseases (ie. Multiple sclerosis, Guillain [place], seizures, neurological changes) or cancer diagnosis? No  8. Are you on any form of radiation or  chemotherapy? No  9. Are you pregnant or breast feeding or do you have plans of pregnancy in the future? No  10. Have you been having any signs of worsening depression or suicidal ideations?  (benlysta only) No  11. Have there been any other new onset medical symptoms? No      Premedications: administered per order.    Drug Waste Record: No    Infusion length and rate:  infusion given over approximately 1 hours    Labs: were not ordered for this appointment.    Vascular access: peripheral IV placed today.    Is the next appt scheduled? yes  Asymptomatic COVID test completed? no    Post Infusion Assessment:  Patient tolerated infusion without incident.     Discharge Plan:   Follow up plan of care with: ongoing infusions at [facility].  Discharge instructions were reviewed with patient.  Patient/representative verbalized understanding of discharge instructions and all questions answered.  Patient discharged from [facility] in stable condition.    Valencia Olson RN       Administrations This Visit     acetaminophen (TYLENOL) tablet 650 mg     Admin Date  04/26/2021 Action  Given Dose  650 mg Route  Oral Administered By  Valencia Olson RN          belimumab (BENLYSTA) 520 mg in sodium chloride 0.9 % 282 mL INFUSION     Admin Date  04/26/2021 Action  New Bag Dose  520 mg Rate  282 mL/hr Route  Intravenous Administered By  Valencia Olson RN          diphenhydrAMINE (BENADRYL) capsule 25 mg     Admin Date  04/26/2021 Action  Given Dose  25 mg Route  Oral Administered By  Valencia Olson RN          methylPREDNISolone sodium succinate (solu-MEDROL) injection 125 mg     Admin Date  04/26/2021 Action  Given Dose  125 mg Route  Intravenous Administered By  Valencia Olson RN

## 2021-04-26 NOTE — LETTER
4/26/2021         RE: Yvonne Barron  [address]  [place] [address]        Dear Colleague,    Thank you for referring your patient, Yvonne Barron, to the [facility]. Please see a copy of my visit note below.    Nursing Note  Yvonne Barron presents [facility] for: Nicklysrobert  During [facility]'s [facility] appointment, orders from Dr Irby were completed.  Frequency: every 28 days    Progress note:  Patient identification verified by name and date of birth.  Assessment completed.  Vitals recorded in Doc Flowsheets.  Patient was provided with education regarding medication/procedure and possible side effects.  Patient verbalized understanding.     present during visit today: Not Applicable.    Treatment Conditions: ~~~ NOTE: If the patient answers yes to any of the questions below, hold the infusion and contact ordering provider or on-call provider.    1. Have you recently had an elevated temperature, fever, chills, productive cough, coughing for 3 weeks or longer or hemoptysis, abnormal vital signs, night sweats,  chest pain or have you noticed a decrease in your appetite, unexplained weight loss or fatigue? No  2. Do you have any open wounds or new incisions? No  3. Do you have any recent or upcoming hospitalizations, surgeries or dental procedures? No  4. Do you currently have or recently have had any signs of illness or infection or are you on any antibiotics? No  5. Have you had any new, sudden or worsening abdominal pain? No  6. Have you or anyone in your household received a live vaccination in the past 4 weeks? Please note:  No live vaccines while on biologic/chemotherapy until 6 months after the last treatment.  Patient can receive the flu vaccine (shot only) and the pneumovax.  It is optimal for the patient to get these vaccines mid cycle, but they can  be given at any time as long as it is not on the day of the infusion. No  7. Have you recently been diagnosed with any new nervous system diseases (ie. Multiple sclerosis, Guillain [place], seizures, neurological changes) or cancer diagnosis? No  8. Are you on any form of radiation or chemotherapy? No  9. Are you pregnant or breast feeding or do you have plans of pregnancy in the future? No  10. Have you been having any signs of worsening depression or suicidal ideations?  (benlysta only) No  11. Have there been any other new onset medical symptoms? No      Premedications: administered per order.    Drug Waste Record: No    Infusion length and rate:  infusion given over approximately 1 hours    Labs: were not ordered for this appointment.    Vascular access: peripheral IV placed today.    Is the next appt scheduled? yes  Asymptomatic COVID test completed? no    Post Infusion Assessment:  Patient tolerated infusion without incident.     Discharge Plan:   Follow up plan of care with: ongoing infusions at [facility].  Discharge instructions were reviewed with patient.  Patient/representative verbalized understanding of discharge instructions and all questions answered.  Patient discharged from [facility] in stable condition.    Valencia Olson RN       Administrations This Visit     acetaminophen (TYLENOL) tablet 650 mg     Admin Date  04/26/2021 Action  Given Dose  650 mg Route  Oral Administered By  Valencia Olson RN          belimumab (BENLYSTA) 520 mg in sodium chloride 0.9 % 282 mL INFUSION     Admin Date  04/26/2021 Action  New Bag Dose  520 mg Rate  282 mL/hr Route  Intravenous Administered By  Valencia Olson RN          diphenhydrAMINE (BENADRYL) capsule 25 mg     Admin Date  04/26/2021 Action  Given Dose  25 mg Route  Oral Administered By  Valencia Olson RN          methylPREDNISolone sodium succinate (solu-MEDROL) injection 125 mg     Admin  Date  04/26/2021 Action  Given Dose  125 mg Route  Intravenous Administered By  Valencia Olson RN                Again, thank you for allowing me to participate in the care of your patient.        Sincerely,        [facility]

## 2021-05-07 ENCOUNTER — VIRTUAL VISIT (OUTPATIENT)
Dept: RHEUMATOLOGY | Facility: CLINIC | Age: 26
End: 2021-05-07
Attending: INTERNAL MEDICINE
Payer: COMMERCIAL

## 2021-05-07 VITALS — HEIGHT: 66 IN | BODY MASS INDEX: 18.64 KG/M2 | WEIGHT: 116 LBS

## 2021-05-07 DIAGNOSIS — M32.9 SYSTEMIC LUPUS ERYTHEMATOSUS, UNSPECIFIED SLE TYPE, UNSPECIFIED ORGAN INVOLVEMENT STATUS (H): Primary | ICD-10-CM

## 2021-05-07 DIAGNOSIS — R79.0 LOW FERRITIN: ICD-10-CM

## 2021-05-07 DIAGNOSIS — E55.9 VITAMIN D DEFICIENCY: ICD-10-CM

## 2021-05-07 DIAGNOSIS — Z51.81 MEDICATION MONITORING ENCOUNTER: ICD-10-CM

## 2021-05-07 PROCEDURE — 99214 OFFICE O/P EST MOD 30 MIN: CPT | Mod: 95 | Performed by: INTERNAL MEDICINE

## 2021-05-07 ASSESSMENT — MIFFLIN-ST. JEOR: SCORE: 1277.92

## 2021-05-07 ASSESSMENT — PAIN SCALES - GENERAL: PAINLEVEL: NO PAIN (0)

## 2021-05-07 NOTE — PROGRESS NOTES
"Chief Complaint   Patient presents with     RECHECK     Height 1.676 m (5' 6\"), weight 52.6 kg (116 lb), not currently breastfeeding.    Patient is ok with student joining visit today with Dr. ragsdale, Mari Pina, BRYANNA Russell is a 25 year old who is being evaluated via a billable video visit.      How would you like to obtain your AVS? [employer]  If the video visit is dropped, the invitation should be resent by: Other e-mail: USE [employer]- PATIENT WILL BE IN THE WAITING ROOM  Will anyone else be joining your video visit? No        "

## 2021-05-07 NOTE — LETTER
"5/7/2021       RE: Yvonne Barron  [address]  [place] [address]     Dear Colleague,    Thank you for referring your patient, Yvonne Barron, to the [facility] [place] [facility]. Please see a copy of my visit note below.    Chief Complaint   Patient presents with     RECHECK     Height 1.676 m (5' 6\"), weight 52.6 kg (116 lb), not currently breastfeeding.    Patient is ok with student joining visit today with Dr. ragsdale, Mari Pina, BRYANNA     Yvonne is a 25 year old who is being evaluated via a billable video visit.      How would you like to obtain your AVS? [email or web address]  If the video visit is dropped, the invitation should be resent by: Other e-mail: USE [email or web address]- PATIENT WILL BE IN THE WAITING ROOM  Will anyone else be joining your video visit? No          Rheumatology Clinic F/U Virtual Visit Note     Last seen: 1/27/2021  DOS: 5/7/2021     Reason for visit: SLE    HPI:  Yvonne Barron is a 25-year-old female with SLE diagnosed in 11/2018 (+dsDNA, low C3/C4, low WBC). Her past medical history is significant for Graves disease s/p radioactive iodine ablation in 2013 and post-ablation hypothyroidism. She initially presented with raynaud's, rash over face and legs, nasal and oral ulcers, weight loss of 30 lbs, and severe fatigue. Was found to have Leukopenia, thrombocytopenia (130), sed rate 45, SHANDA 1:320, Sm>8, SSA>8, RNP>8, DS DNA 17, low C3 C4. Seen today for follow-up on disease management with HCQ, cellcept, and benlysta infusions.      Today 5/7/2021: Patient reports that benlysta infusions are going well without side effects. Thinks it is helping. Modest improvement in appetite and weight gains since last visit. Now at approximately 116lbs. Still forcing herself to eat more, but also some increase in appetite. Was previously eating 1 meal today. Has found it helpful to instead eat 3 smaller meals " per day.     Reports some non-localized joint pain/aches and fatigue. No major changes and not worse.    Received COVID vaccine in Feb/March. No side effects. 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    Wondering about Fe levels (last checked 1/2020) and would like to check again.        REVIEW OF SYMPTOMS:  A comprehensive ROS was done. Positives are per HPI.        HISTORY REVIEW:  Past Medical History:   Diagnosis Date     Hypothyroidism      Lupus (systemic lupus erythematosus) (H)      No past surgical history on file.  No family history on file.  Social History     Socioeconomic History     Marital status: Single     Spouse name: Not on file     Number of children: Not on file     Years of education: Not on file     Highest education level: Not on file   Occupational History     Not on file   Social Needs     Financial resource strain: Not on file     Food insecurity     Worry: Not on file     Inability: Not on file     Transportation needs     Medical: Not on file     Non-medical: Not on file   Tobacco Use     Smoking status: Never Smoker     Smokeless tobacco: Never Used   Substance and Sexual Activity     Alcohol use: No     Frequency: Never     Drug use: No     Sexual activity: Never   Lifestyle     Physical activity     Days per week: Not on file     Minutes per session: Not on file     Stress: Not on file   Relationships     Social connections     Talks on phone: Not on file     Gets together: Not on file     Attends Orthodoxy service: Not on file     Active member of club or organization: Not on file     Attends meetings of clubs or organizations: Not on file     Relationship status: Not on file     Intimate partner violence     Fear of current or ex partner: Not on file     Emotionally abused: Not on file     Physically abused: Not on file     Forced sexual activity: Not on file   Other Topics Concern     Not on file   Social History Narrative     Not on file     Patient  Active Problem List   Diagnosis     Systemic lupus erythematosus (H)     No Known Allergies    I reviewed the Current Medications:  Outpatient Medications Prior to Visit   Medication Sig Dispense Refill     hydrocortisone, Perianal, (HYDROCORTISONE) 2.5 % cream Place rectally every morning 30 g 3     hydroquinone (CRISTA) 4 % external cream Apply to hyperpigmented areas twice a day 30 g 3     hydroxychloroquine (PLAQUENIL) 200 MG tablet 200 mg bid on Mon/Wed/Fri and 200 mg a day the rest of the week. Get annual eye exams for hydroxychloroquine (Plaquenil) monitoring and fax to [phone]. 120 tablet 0     levothyroxine (SYNTHROID/LEVOTHROID) 75 MCG tablet Take 75 mcg by mouth daily       mycophenolate (GENERIC EQUIVALENT) 500 MG tablet Take 1 tablet (500 mg) by mouth 2 times daily 120 tablet 3     sildenafil (REVATIO) 20 MG tablet Take 1 tablet (20 mg) by mouth 3 times daily 270 tablet 2     tiZANidine (ZANAFLEX) 4 MG tablet Take 1 tablet (4 mg) by mouth 3 times daily as needed for muscle spasms 60 tablet 3     traMADol (ULTRAM) 50 MG tablet Take 1 tablet (50 mg) by mouth every 12 hours as needed for severe pain No driving or alcohol use while taking medication. 60 tablet 3     tretinoin (RETIN-A) 0.025 % external cream Use every night.  Appt due 3/21. 45 g 7     vitamin D2 (ERGOCALCIFEROL) 59844 units (1250 mcg) capsule Take 1 capsule (50,000 Units) by mouth every 7 days 12 capsule 0     No facility-administered medications prior to visit.        Physical Exam:   GENERAL: alert and oriented, no acute distress  EYES: extraocular movements grossly intact, no conjunctivitis/scleritis, no discharge  ENT: normocephalic, no visible nasal discharge, no visible defect of external ears, no hearing deficits, no visible masses on neck  RESP: no audible wheezes or crackles, no cough  CV: unable to perform via video visit  ABDOMEN: unable to perform via video visit  SKIN: no visible edema, no rashes, no alopecia  PSYCH:  positive affect, coherent speech  NEURO: No apparent CN defects, alert and oriented  MUSCULOSKELETAL: no synovitis, normal range of motion, able to form full fist    Recent lab results:    Labs Latest Ref Rng & Units 3/1/2021   WBC 4.0 - 11.0 10e9/L 1.6 (L)   RBC 3.8 - 5.2 10e12/L 4.04   HGB 11.7 - 15.7 g/dL 10.5 (L)   HCT 35.0 - 47.0 % 33.8 (L)   MCV 78 - 100 fl 84   MCH 26.5 - 33.0 pg 26.0 (L)   MCHC 31.5 - 36.5 g/dL 31.1 (L)   RDW 10.0 - 15.0 % 14.2    - 450 10e9/L 191   % NEUTROPHILS % 62.2   % LYMPHOCYTES % 26.3   % MONOCYTES % 10.3   % EOSINOPHILS % 0.0   % BASOPHILS % 0.6   ABSOLUTE NEUTROPHIL 1.6 - 8.3 10e9/L 1.0 (L)   ABSOLUTE LYMPHOCYTES 0.8 - 5.3 10e9/L 0.4 (L)   ABSOLUTE MONOCYTES 0.0 - 1.3 10e9/L 0.2   ABSOLUTE EOSINOPHILS 0.0 - 0.7 10e9/L 0.0   ABSOULTE BASOPHILS 0.0 - 0.2 10e9/L 0.0   AST 0 - 45 U/L 14   ALT 0 - 50 U/L 14   ALBUMIN 3.4 - 5.0 g/dL 3.5   CREATININE 0.52 - 1.04 mg/dL 0.53   GFR ESTIMATE >60 mL/min/1.73:m2 >90   GFR ESTIMATE, IF BLACK >60 mL/min/1.73:m2 >90   SED RATE 0 - 20 mm/h 54 (H)   CRP, INFLAMMATION 0.0 - 8.0 mg/L <2.9   PROTEIN TOTAL UR G/G CR 0 - 0.2 g/g Cr Unable to calculate due to low value   DNA-DS <10 IU/mL 91 (H)   COMPLEMENT C3 81 - 157 mg/dL 69 (L)   COMPLEMENT C4 13 - 39 mg/dL 6 (L)   CK TOTAL 30 - 225 U/L      Labs Latest Ref Rng & Units 1/4/2021   WBC 4.0 - 11.0 10e9/L 1.6 (L)   RBC 3.8 - 5.2 10e12/L 3.88   HGB 11.7 - 15.7 g/dL 10.9 (L)   HCT 35.0 - 47.0 % 35.3   MCV 78 - 100 fl 91   MCH 26.5 - 33.0 pg 28.1   MCHC 31.5 - 36.5 g/dL 30.9 (L)   RDW 10.0 - 15.0 % 13.0    - 450 10e9/L 165   % NEUTROPHILS % 66.5   % LYMPHOCYTES % 24.8   % MONOCYTES % 8.1   % EOSINOPHILS % 0.6   % BASOPHILS % 0.0   ABSOLUTE NEUTROPHIL 1.6 - 8.3 10e9/L 1.1 (L)   ABSOLUTE LYMPHOCYTES 0.8 - 5.3 10e9/L 0.4 (L)   ABSOLUTE MONOCYTES 0.0 - 1.3 10e9/L 0.1   ABSOLUTE EOSINOPHILS 0.0 - 0.7 10e9/L 0.0   ABSOULTE BASOPHILS 0.0 - 0.2 10e9/L 0.0   AST 0 - 45 U/L 14   ALT 0 - 50 U/L 12    ALBUMIN 3.4 - 5.0 g/dL 3.2 (L)   CREATININE 0.52 - 1.04 mg/dL 0.44 (L)   GFR ESTIMATE >60 mL/min/1.73:m2 >90   GFR ESTIMATE, IF BLACK >60 mL/min/1.73:m2 >90   SED RATE 0 - 20 mm/h 46 (H)   CRP, INFLAMMATION 0.0 - 8.0 mg/L <2.9   PROTEIN TOTAL UR G/G CR 0 - 0.2 g/g Cr    DNA-DS <10 IU/mL 145 (H)   COMPLEMENT C3 81 - 157 mg/dL 72 (L)   COMPLEMENT C4 13 - 39 mg/dL 3 (L)   CK TOTAL 30 - 225 U/L        ASSESSMENT and PLAN:    SLE with weight loss, active lupus serology (+anti-DNA, low C3/C4 3/2021), leukopenia (1.6 in all readings since 11/2020) but resolved chill julia lesions over toes. She is now up to 116 lbs from almost 100 lbs in 6/2020. We reduced cellcept from 3 to 2 gram a day in 6/2020 and we did further tapered to 1 gram a day at 11/2020 visit as it might be causing weight loss. We added benlysta at 11/2020 visit, she tolerates it well and feels it has helped with her disease.   Only current complaint is occasional arthralgia and fatigue. Recommended a trial of tramadol at last visit; risks were discussed.    Today 5/7/2021: Patient is stable with no major concerns. Benlysta infusions are going well. Weight gain is slow, but trending in the right direction. Will continue to hold off on further GI work-up based on continued progress. Hgb was slightly below nl in last few labs. Will include Fe with today s lab orders and continue to monitor. Recent lab values are otherwise stable or slightly improved. We agreed to continue with current treatment plan.      Plan:    Continue benlysta monthly infusions (since 12/8/2020, peak benefit is 6 months)    Continue cellcept 500 mg bid (consider decreasing at next visit)    Labs in 6/2021 (including Fe and vitamin D) then [id number]    Tramadol 50 mg twice a day as needed for pain; risks were discussed    Continue plaquenil 2 tabs a day M/W/F, 1 tab a day the rest of the week (adjuted the dose given weight loss) with annual eye exam    GI follow up for weight loss  (previously cancelled EGD/colonoscopy as gained weight by tapering cellcept - might do it in future if weight goes down again)    Follow up in 6 months      I saw and examined the patient with Dr. Irby. I acted as scribe for Dr. Irby.    Jeffrey Wahl MS    Attending Note: I saw and examined the patient with medical student Jeffrey . This note was written by him who acted as scribe for me. I agree with findings and recommendations written in this note. The note reflects decisions made by me.    Orders Placed This Encounter   Procedures     ALT     Albumin level     AST     CBC with platelets differential     Creatinine     Complement C4     Complement C3     CRP inflammation     DNA double stranded antibodies     Erythrocyte sedimentation rate auto     UA with Microscopic reflex to Culture     Protein  random urine with Creat Ratio     Creatinine random urine     Vitamin D Deficiency     Ferritin     Iron and iron binding capacity     Video visit start: 1:57pm  Video visit end: 2:17pm    Via Raghu pt is home

## 2021-05-09 NOTE — PROGRESS NOTES
Rheumatology Clinic F/U Virtual Visit Note     Last seen: 1/27/2021  DOS: 5/7/2021     Reason for visit: SLE    HPI:  Yvonne Barron is a 25-year-old female with SLE diagnosed in 11/2018 (+dsDNA, low C3/C4, low WBC). Her past medical history is significant for Graves disease s/p radioactive iodine ablation in 2013 and post-ablation hypothyroidism. She initially presented with raynaud's, rash over face and legs, nasal and oral ulcers, weight loss of 30 lbs, and severe fatigue. Was found to have Leukopenia, thrombocytopenia (130), sed rate 45, SHANDA 1:320, Sm>8, SSA>8, RNP>8, DS DNA 17, low C3 C4. Seen today for follow-up on disease management with HCQ, cellcept, and benlysta infusions.      Today 5/7/2021: Patient reports that benlysta infusions are going well without side effects. Thinks it is helping. Modest improvement in appetite and weight gains since last visit. Now at approximately 116lbs. Still forcing herself to eat more, but also some increase in appetite. Was previously eating 1 meal today. Has found it helpful to instead eat 3 smaller meals per day.     Reports some non-localized joint pain/aches and fatigue. No major changes and not worse.    Received COVID vaccine in Feb/March. No side effects. 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    Wondering about Fe levels (last checked 1/2020) and would like to check again.        REVIEW OF SYMPTOMS:  A comprehensive ROS was done. Positives are per HPI.        HISTORY REVIEW:  Past Medical History:   Diagnosis Date     Hypothyroidism      Lupus (systemic lupus erythematosus) (H)      No past surgical history on file.  No family history on file.  Social History     Socioeconomic History     Marital status: Single     Spouse name: Not on file     Number of children: Not on file     Years of education: Not on file     Highest education level: Not on file   Occupational History     Not on file   Social Needs     Financial resource  strain: Not on file     Food insecurity     Worry: Not on file     Inability: Not on file     Transportation needs     Medical: Not on file     Non-medical: Not on file   Tobacco Use     Smoking status: Never Smoker     Smokeless tobacco: Never Used   Substance and Sexual Activity     Alcohol use: No     Frequency: Never     Drug use: No     Sexual activity: Never   Lifestyle     Physical activity     Days per week: Not on file     Minutes per session: Not on file     Stress: Not on file   Relationships     Social connections     Talks on phone: Not on file     Gets together: Not on file     Attends Dru[employer] service: Not on file     Active member of club or organization: Not on file     Attends meetings of clubs or organizations: Not on file     Relationship status: Not on file     Intimate partner violence     Fear of current or ex partner: Not on file     Emotionally abused: Not on file     Physically abused: Not on file     Forced sexual activity: Not on file   Other Topics Concern     Not on file   Social History Narrative     Not on file     Patient Active Problem List   Diagnosis     Systemic lupus erythematosus (H)     No Known Allergies    I reviewed the Current Medications:  Outpatient Medications Prior to Visit   Medication Sig Dispense Refill     hydrocortisone, Perianal, (HYDROCORTISONE) 2.5 % cream Place rectally every morning 30 g 3     hydroquinone (CRISTA) 4 % external cream Apply to hyperpigmented areas twice a day 30 g 3     hydroxychloroquine (PLAQUENIL) 200 MG tablet 200 mg bid on Mon/Wed/Fri and 200 mg a day the rest of the week. Get annual eye exams for hydroxychloroquine (Plaquenil) monitoring and fax to [phone]. 120 tablet 0     levothyroxine (SYNTHROID/LEVOTHROID) 75 MCG tablet Take 75 mcg by mouth daily       mycophenolate (GENERIC EQUIVALENT) 500 MG tablet Take 1 tablet (500 mg) by mouth 2 times daily 120 tablet 3     sildenafil (REVATIO) 20 MG tablet Take 1 tablet (20 mg) by mouth  3 times daily 270 tablet 2     tiZANidine (ZANAFLEX) 4 MG tablet Take 1 tablet (4 mg) by mouth 3 times daily as needed for muscle spasms 60 tablet 3     traMADol (ULTRAM) 50 MG tablet Take 1 tablet (50 mg) by mouth every 12 hours as needed for severe pain No driving or alcohol use while taking medication. 60 tablet 3     tretinoin (RETIN-A) 0.025 % external cream Use every night.  Appt due 3/21. 45 g 7     vitamin D2 (ERGOCALCIFEROL) 40208 units (1250 mcg) capsule Take 1 capsule (50,000 Units) by mouth every 7 days 12 capsule 0     No facility-administered medications prior to visit.        Physical Exam:   GENERAL: alert and oriented, no acute distress  EYES: extraocular movements grossly intact, no conjunctivitis/scleritis, no discharge  ENT: normocephalic, no visible nasal discharge, no visible defect of external ears, no hearing deficits, no visible masses on neck  RESP: no audible wheezes or crackles, no cough  CV: unable to perform via video visit  ABDOMEN: unable to perform via video visit  SKIN: no visible edema, no rashes, no alopecia  PSYCH: positive affect, coherent speech  NEURO: No apparent CN defects, alert and oriented  MUSCULOSKELETAL: no synovitis, normal range of motion, able to form full fist    Recent lab results:    Labs Latest Ref Rng & Units 3/1/2021   WBC 4.0 - 11.0 10e9/L 1.6 (L)   RBC 3.8 - 5.2 10e12/L 4.04   HGB 11.7 - 15.7 g/dL 10.5 (L)   HCT 35.0 - 47.0 % 33.8 (L)   MCV 78 - 100 fl 84   MCH 26.5 - 33.0 pg 26.0 (L)   MCHC 31.5 - 36.5 g/dL 31.1 (L)   RDW 10.0 - 15.0 % 14.2    - 450 10e9/L 191   % NEUTROPHILS % 62.2   % LYMPHOCYTES % 26.3   % MONOCYTES % 10.3   % EOSINOPHILS % 0.0   % BASOPHILS % 0.6   ABSOLUTE NEUTROPHIL 1.6 - 8.3 10e9/L 1.0 (L)   ABSOLUTE LYMPHOCYTES 0.8 - 5.3 10e9/L 0.4 (L)   ABSOLUTE MONOCYTES 0.0 - 1.3 10e9/L 0.2   ABSOLUTE EOSINOPHILS 0.0 - 0.7 10e9/L 0.0   ABSOULTE BASOPHILS 0.0 - 0.2 10e9/L 0.0   AST 0 - 45 U/L 14   ALT 0 - 50 U/L 14   ALBUMIN 3.4 - 5.0 g/dL  3.5   CREATININE 0.52 - 1.04 mg/dL 0.53   GFR ESTIMATE >60 mL/min/1.73:m2 >90   GFR ESTIMATE, IF BLACK >60 mL/min/1.73:m2 >90   SED RATE 0 - 20 mm/h 54 (H)   CRP, INFLAMMATION 0.0 - 8.0 mg/L <2.9   PROTEIN TOTAL UR G/G CR 0 - 0.2 g/g Cr Unable to calculate due to low value   DNA-DS <10 IU/mL 91 (H)   COMPLEMENT C3 81 - 157 mg/dL 69 (L)   COMPLEMENT C4 13 - 39 mg/dL 6 (L)   CK TOTAL 30 - 225 U/L      Labs Latest Ref Rng & Units 1/4/2021   WBC 4.0 - 11.0 10e9/L 1.6 (L)   RBC 3.8 - 5.2 10e12/L 3.88   HGB 11.7 - 15.7 g/dL 10.9 (L)   HCT 35.0 - 47.0 % 35.3   MCV 78 - 100 fl 91   MCH 26.5 - 33.0 pg 28.1   MCHC 31.5 - 36.5 g/dL 30.9 (L)   RDW 10.0 - 15.0 % 13.0    - 450 10e9/L 165   % NEUTROPHILS % 66.5   % LYMPHOCYTES % 24.8   % MONOCYTES % 8.1   % EOSINOPHILS % 0.6   % BASOPHILS % 0.0   ABSOLUTE NEUTROPHIL 1.6 - 8.3 10e9/L 1.1 (L)   ABSOLUTE LYMPHOCYTES 0.8 - 5.3 10e9/L 0.4 (L)   ABSOLUTE MONOCYTES 0.0 - 1.3 10e9/L 0.1   ABSOLUTE EOSINOPHILS 0.0 - 0.7 10e9/L 0.0   ABSOULTE BASOPHILS 0.0 - 0.2 10e9/L 0.0   AST 0 - 45 U/L 14   ALT 0 - 50 U/L 12   ALBUMIN 3.4 - 5.0 g/dL 3.2 (L)   CREATININE 0.52 - 1.04 mg/dL 0.44 (L)   GFR ESTIMATE >60 mL/min/1.73:m2 >90   GFR ESTIMATE, IF BLACK >60 mL/min/1.73:m2 >90   SED RATE 0 - 20 mm/h 46 (H)   CRP, INFLAMMATION 0.0 - 8.0 mg/L <2.9   PROTEIN TOTAL UR G/G CR 0 - 0.2 g/g Cr    DNA-DS <10 IU/mL 145 (H)   COMPLEMENT C3 81 - 157 mg/dL 72 (L)   COMPLEMENT C4 13 - 39 mg/dL 3 (L)   CK TOTAL 30 - 225 U/L        ASSESSMENT and PLAN:    SLE with weight loss, active lupus serology (+anti-DNA, low C3/C4 3/2021), leukopenia (1.6 in all readings since 11/2020) but resolved chill julia lesions over toes. She is now up to 116 lbs from almost 100 lbs in 6/2020. We reduced cellcept from 3 to 2 gram a day in 6/2020 and we did further tapered to 1 gram a day at 11/2020 visit as it might be causing weight loss. We added benlysta at 11/2020 visit, she tolerates it well and feels it has helped with her  disease.   Only current complaint is occasional arthralgia and fatigue. Recommended a trial of tramadol at last visit; risks were discussed.    Today 5/7/2021: Patient is stable with no major concerns. Benlysta infusions are going well. Weight gain is slow, but trending in the right direction. Will continue to hold off on further GI work-up based on continued progress. Hgb was slightly below nl in last few labs. Will include Fe with today s lab orders and continue to monitor. Recent lab values are otherwise stable or slightly improved. We agreed to continue with current treatment plan.      Plan:    Continue benlysta monthly infusions (since 12/8/2020, peak benefit is 6 months)    Continue cellcept 500 mg bid (consider decreasing at next visit)    Labs in 6/2021 (including Fe and vitamin D) then [id number]    Tramadol 50 mg twice a day as needed for pain; risks were discussed    Continue plaquenil 2 tabs a day M/W/F, 1 tab a day the rest of the week (adjuted the dose given weight loss) with annual eye exam    GI follow up for weight loss (previously cancelled EGD/colonoscopy as gained weight by tapering cellcept - might do it in future if weight goes down again)    Follow up in 6 months      I saw and examined the patient with Dr. Irby. I acted as scribe for Dr. Irby.    Jeffrey Wahl MS    Attending Note: I saw and examined the patient with medical student Jeffrey . This note was written by him who acted as scribe for me. I agree with findings and recommendations written in this note. The note reflects decisions made by me.    Orders Placed This Encounter   Procedures     ALT     Albumin level     AST     CBC with platelets differential     Creatinine     Complement C4     Complement C3     CRP inflammation     DNA double stranded antibodies     Erythrocyte sedimentation rate auto     UA with Microscopic reflex to Culture     Protein  random urine with Creat Ratio     Creatinine random urine     Vitamin D  Deficiency     Ferritin     Iron and iron binding capacity     Video visit start: 1:57pm  Video visit end: 2:17pm    Via Raghu pt is home

## 2021-05-24 ENCOUNTER — INFUSION THERAPY VISIT (OUTPATIENT)
Dept: INFUSION THERAPY | Facility: CLINIC | Age: 26
End: 2021-05-24
Attending: INTERNAL MEDICINE
Payer: COMMERCIAL

## 2021-05-24 VITALS
DIASTOLIC BLOOD PRESSURE: 62 MMHG | OXYGEN SATURATION: 99 % | BODY MASS INDEX: 18.67 KG/M2 | WEIGHT: 115.7 LBS | SYSTOLIC BLOOD PRESSURE: 93 MMHG | TEMPERATURE: 98.4 F | RESPIRATION RATE: 16 BRPM | HEART RATE: 90 BPM

## 2021-05-24 DIAGNOSIS — M32.9 SYSTEMIC LUPUS ERYTHEMATOSUS, UNSPECIFIED SLE TYPE, UNSPECIFIED ORGAN INVOLVEMENT STATUS (H): Primary | ICD-10-CM

## 2021-05-24 PROCEDURE — 250N000013 HC RX MED GY IP 250 OP 250 PS 637: Performed by: INTERNAL MEDICINE

## 2021-05-24 PROCEDURE — 258N000003 HC RX IP 258 OP 636: Performed by: INTERNAL MEDICINE

## 2021-05-24 PROCEDURE — 250N000011 HC RX IP 250 OP 636: Performed by: INTERNAL MEDICINE

## 2021-05-24 PROCEDURE — 96365 THER/PROPH/DIAG IV INF INIT: CPT

## 2021-05-24 PROCEDURE — 96375 TX/PRO/DX INJ NEW DRUG ADDON: CPT

## 2021-05-24 RX ORDER — METHYLPREDNISOLONE SODIUM SUCCINATE 125 MG/2ML
125 INJECTION, POWDER, LYOPHILIZED, FOR SOLUTION INTRAMUSCULAR; INTRAVENOUS ONCE
Status: COMPLETED | OUTPATIENT
Start: 2021-05-24 | End: 2021-05-24

## 2021-05-24 RX ORDER — ACETAMINOPHEN 325 MG/1
650 TABLET ORAL ONCE
Status: CANCELLED
Start: 2021-05-25

## 2021-05-24 RX ORDER — DIPHENHYDRAMINE HCL 25 MG
25 CAPSULE ORAL ONCE
Status: CANCELLED
Start: 2021-05-25

## 2021-05-24 RX ORDER — METHYLPREDNISOLONE SODIUM SUCCINATE 125 MG/2ML
125 INJECTION, POWDER, LYOPHILIZED, FOR SOLUTION INTRAMUSCULAR; INTRAVENOUS ONCE
Status: CANCELLED | OUTPATIENT
Start: 2021-05-25

## 2021-05-24 RX ORDER — ACETAMINOPHEN 325 MG/1
650 TABLET ORAL ONCE
Status: COMPLETED | OUTPATIENT
Start: 2021-05-24 | End: 2021-05-24

## 2021-05-24 RX ORDER — HEPARIN SODIUM (PORCINE) LOCK FLUSH IV SOLN 100 UNIT/ML 100 UNIT/ML
5 SOLUTION INTRAVENOUS
Status: CANCELLED | OUTPATIENT
Start: 2021-05-25

## 2021-05-24 RX ORDER — HEPARIN SODIUM,PORCINE 10 UNIT/ML
5 VIAL (ML) INTRAVENOUS
Status: DISCONTINUED | OUTPATIENT
Start: 2021-05-24 | End: 2021-05-24 | Stop reason: HOSPADM

## 2021-05-24 RX ORDER — HEPARIN SODIUM (PORCINE) LOCK FLUSH IV SOLN 100 UNIT/ML 100 UNIT/ML
5 SOLUTION INTRAVENOUS
Status: DISCONTINUED | OUTPATIENT
Start: 2021-05-24 | End: 2021-05-24 | Stop reason: HOSPADM

## 2021-05-24 RX ORDER — HEPARIN SODIUM,PORCINE 10 UNIT/ML
5 VIAL (ML) INTRAVENOUS
Status: CANCELLED | OUTPATIENT
Start: 2021-05-25

## 2021-05-24 RX ORDER — DIPHENHYDRAMINE HCL 25 MG
25 CAPSULE ORAL ONCE
Status: COMPLETED | OUTPATIENT
Start: 2021-05-24 | End: 2021-05-24

## 2021-05-24 RX ADMIN — BELIMUMAB 520 MG: 400 INJECTION, POWDER, LYOPHILIZED, FOR SOLUTION INTRAVENOUS at 11:46

## 2021-05-24 RX ADMIN — DIPHENHYDRAMINE HYDROCHLORIDE 25 MG: 25 CAPSULE ORAL at 11:21

## 2021-05-24 RX ADMIN — METHYLPREDNISOLONE SODIUM SUCCINATE 125 MG: 125 INJECTION, POWDER, FOR SOLUTION INTRAMUSCULAR; INTRAVENOUS at 11:21

## 2021-05-24 RX ADMIN — ACETAMINOPHEN 650 MG: 325 TABLET ORAL at 11:21

## 2021-05-24 NOTE — LETTER
5/24/2021         RE: Yvonne Barron  [address]  [place] [address]        Dear Colleague,    Thank you for referring your patient, Yvonne Barron, to the [facility]. Please see a copy of my visit note below.    Nursing Note  Yvonne Barron [facility] for:   Chief Complaint   Patient presents with     Infusion     Benlysta     During today's [facility] appointment, orders from Dr. Irby were completed.  Frequency: every 28 days    Progress note:  Patient identification verified by name and date of birth.  Assessment completed.  Vitals recorded in Doc Flowsheets.  Patient was provided with education regarding medication/procedure and possible side effects.  Patient verbalized understanding.     present during visit today: Not Applicable.    Treatment Conditions: ~~~ NOTE: If the patient answers yes to any of the questions below, hold the infusion and contact ordering provider or on-call provider.    1. Have you recently had an elevated temperature, fever, chills, productive cough, coughing for 3 weeks or longer or hemoptysis, abnormal vital signs, night sweats,  chest pain or have you noticed a decrease in your appetite, unexplained weight loss or fatigue? No  2. Do you have any open wounds or new incisions? No  3. Do you have any recent or upcoming hospitalizations, surgeries or dental procedures? No  4. Do you currently have or recently have had any signs of illness or infection or are you on any antibiotics? No  5. Have you had any new, sudden or worsening abdominal pain? No  6. Have you or anyone in your household received a live vaccination in the past 4 weeks? Please note:  No live vaccines while on biologic/chemotherapy until 6 months after the last treatment.  Patient can receive the flu vaccine (shot only) and the pneumovax.  It is optimal  for the patient to get these vaccines mid cycle, but they can be given at any time as long as it is not on the day of the infusion. No  7. Have you recently been diagnosed with any new nervous system diseases (ie. Multiple sclerosis, Guillain [place], seizures, neurological changes) or cancer diagnosis? No  8. Are you on any form of radiation or chemotherapy? No  9. Are you pregnant or breast feeding or do you have plans of pregnancy in the future? No  10. Have you been having any signs of worsening depression or suicidal ideations?  (benlysta only) No  11. Have there been any other new onset medical symptoms? No      Premedications: administered per order.    Drug Waste Record: No    Infusion length and rate:  infusion given over approximately 60 minutes    Labs: were not ordered for this appointment.    Vascular access: peripheral IV placed today.    Is the next appt scheduled? yes  Asymptomatic COVID test completed? no    Post Infusion Assessment:  Patient tolerated infusion without incident.     Discharge Plan:   Follow up plan of care with: ongoing infusions at [facility]. and ordering provider as scheduled.  Discharge instructions were reviewed with patient.  Patient/representative verbalized understanding of discharge instructions and all questions answered.  Patient discharged from [facility] in stable condition.    Sue Staley RN       Administrations This Visit     acetaminophen (TYLENOL) tablet 650 mg     Admin Date  05/24/2021 Action  Given Dose  650 mg Route  Oral Administered By  Sue Staley RN          belimumab (BENLYSTA) 520 mg in sodium chloride 0.9 % 282 mL INFUSION     Admin Date  05/24/2021 Action  New Bag Dose  520 mg Rate  282 mL/hr Route  Intravenous Administered By  Sue Staley RN          diphenhydrAMINE (BENADRYL) capsule 25 mg     Admin Date  05/24/2021 Action  Given Dose  25 mg Route  Oral Administered By  Sue Staley,  RN          methylPREDNISolone sodium succinate (solu-MEDROL) injection 125 mg     Admin Date  05/24/2021 Action  Given Dose  125 mg Route  Intravenous Administered By  Sue Staley, GIUSEPPE                BP 95/64 (BP Location: Left arm)   Pulse 94   Temp 98.4  F (36.9  C) (Oral)   Resp 16   Wt 52.5 kg (115 lb 11.2 oz)   SpO2 99%   BMI 18.67 kg/m            Again, thank you for allowing me to participate in the care of your patient.        Sincerely,        [facility]

## 2021-05-24 NOTE — PROGRESS NOTES
Nursing Note  Yvonne Barron presents [facility] for:   Chief Complaint   Patient presents with     Infusion     Benlysta     During [facility]'s [facility] appointment, orders from Dr. Irby were completed.  Frequency: every 28 days    Progress note:  Patient identification verified by name and date of birth.  Assessment completed.  Vitals recorded in Doc Flowsheets.  Patient was provided with education regarding medication/procedure and possible side effects.  Patient verbalized understanding.     present during visit today: Not Applicable.    Treatment Conditions: ~~~ NOTE: If the patient answers yes to any of the questions below, hold the infusion and contact ordering provider or on-call provider.    1. Have you recently had an elevated temperature, fever, chills, productive cough, coughing for 3 weeks or longer or hemoptysis, abnormal vital signs, night sweats,  chest pain or have you noticed a decrease in your appetite, unexplained weight loss or fatigue? No  2. Do you have any open wounds or new incisions? No  3. Do you have any recent or upcoming hospitalizations, surgeries or dental procedures? No  4. Do you currently have or recently have had any signs of illness or infection or are you on any antibiotics? No  5. Have you had any new, sudden or worsening abdominal pain? No  6. Have you or anyone in your household received a live vaccination in the past 4 weeks? Please note:  No live vaccines while on biologic/chemotherapy until 6 months after the last treatment.  Patient can receive the flu vaccine (shot only) and the pneumovax.  It is optimal for the patient to get these vaccines mid cycle, but they can be given at any time as long as it is not on the day of the infusion. No  7. Have you recently been diagnosed with any new nervous system diseases (ie. Multiple sclerosis, Guillain [place], seizures, neurological changes) or cancer  diagnosis? No  8. Are you on any form of radiation or chemotherapy? No  9. Are you pregnant or breast feeding or do you have plans of pregnancy in the future? No  10. Have you been having any signs of worsening depression or suicidal ideations?  (benlysta only) No  11. Have there been any other new onset medical symptoms? No      Premedications: administered per order.    Drug Waste Record: No    Infusion length and rate:  infusion given over approximately 60 minutes    Labs: were not ordered for this appointment.    Vascular access: peripheral IV placed today.    Is the next appt scheduled? yes  Asymptomatic COVID test completed? no    Post Infusion Assessment:  Patient tolerated infusion without incident.     Discharge Plan:   Follow up plan of care with: ongoing infusions at [facility]. and ordering provider as scheduled.  Discharge instructions were reviewed with patient.  Patient/representative verbalized understanding of discharge instructions and all questions answered.  Patient discharged from [facility] in stable condition.    Sue Staley RN       Administrations This Visit     acetaminophen (TYLENOL) tablet 650 mg     Admin Date  05/24/2021 Action  Given Dose  650 mg Route  Oral Administered By  Sue Staley RN          belimumab (BENLYSTA) 520 mg in sodium chloride 0.9 % 282 mL INFUSION     Admin Date  05/24/2021 Action  New Bag Dose  520 mg Rate  282 mL/hr Route  Intravenous Administered By  Sue Staley RN          diphenhydrAMINE (BENADRYL) capsule 25 mg     Admin Date  05/24/2021 Action  Given Dose  25 mg Route  Oral Administered By  Sue Staley RN          methylPREDNISolone sodium succinate (solu-MEDROL) injection 125 mg     Admin Date  05/24/2021 Action  Given Dose  125 mg Route  Intravenous Administered By  Sue Staley RN                BP 95/64 (BP Location: Left arm)   Pulse 94   Temp 98.4  F (36.9  C) (Oral)   Resp 16    Wt 52.5 kg (115 lb 11.2 oz)   SpO2 99%   BMI 18.67 kg/m

## 2021-06-06 DIAGNOSIS — E55.9 VITAMIN D DEFICIENCY: ICD-10-CM

## 2021-06-10 NOTE — TELEPHONE ENCOUNTER
VITAMIN D2 50,000IU (ERGO) CAP RX      Last Written Prescription Date:  3/8/21  Last Fill Quantity: 12 capsules,   # refills: 0  Last Office Visit : 5/7/21 recommended 6 month follow up  Future Office visit:  None scheduled    Routing refill request to provider for review/approval because:  Drug not on rheumatology refill protocol

## 2021-06-13 RX ORDER — ERGOCALCIFEROL 1.25 MG/1
50000 CAPSULE, LIQUID FILLED ORAL
Qty: 12 CAPSULE | Refills: 0 | Status: SHIPPED | OUTPATIENT
Start: 2021-06-13 | End: 2021-09-22

## 2021-06-17 DIAGNOSIS — M32.9 SYSTEMIC LUPUS ERYTHEMATOSUS, UNSPECIFIED SLE TYPE, UNSPECIFIED ORGAN INVOLVEMENT STATUS (H): ICD-10-CM

## 2021-06-17 DIAGNOSIS — R79.0 LOW FERRITIN: ICD-10-CM

## 2021-06-17 DIAGNOSIS — E55.9 VITAMIN D DEFICIENCY: ICD-10-CM

## 2021-06-17 DIAGNOSIS — Z51.81 MEDICATION MONITORING ENCOUNTER: ICD-10-CM

## 2021-06-17 LAB
ALBUMIN SERPL-MCNC: 3.6 G/DL (ref 3.4–5)
ALT SERPL W P-5'-P-CCNC: 10 U/L (ref 0–50)
AST SERPL W P-5'-P-CCNC: 10 U/L (ref 0–45)
BASOPHILS # BLD AUTO: 0 10E9/L (ref 0–0.2)
BASOPHILS NFR BLD AUTO: 0 %
CREAT SERPL-MCNC: 0.5 MG/DL (ref 0.52–1.04)
CRP SERPL-MCNC: <2.9 MG/L (ref 0–8)
DIFFERENTIAL METHOD BLD: ABNORMAL
EOSINOPHIL # BLD AUTO: 0 10E9/L (ref 0–0.7)
EOSINOPHIL NFR BLD AUTO: 0 %
ERYTHROCYTE [DISTWIDTH] IN BLOOD BY AUTOMATED COUNT: 16.2 % (ref 10–15)
ERYTHROCYTE [SEDIMENTATION RATE] IN BLOOD BY WESTERGREN METHOD: 40 MM/H (ref 0–20)
FERRITIN SERPL-MCNC: 7 NG/ML (ref 12–150)
GFR SERPL CREATININE-BSD FRML MDRD: >90 ML/MIN/{1.73_M2}
HCT VFR BLD AUTO: 32.9 % (ref 35–47)
HGB BLD-MCNC: 10 G/DL (ref 11.7–15.7)
IMM GRANULOCYTES # BLD: 0 10E9/L (ref 0–0.4)
IMM GRANULOCYTES NFR BLD: 0 %
IRON SATN MFR SERPL: 7 % (ref 15–46)
IRON SERPL-MCNC: 26 UG/DL (ref 35–180)
LYMPHOCYTES # BLD AUTO: 0.4 10E9/L (ref 0.8–5.3)
LYMPHOCYTES NFR BLD AUTO: 30.2 %
MCH RBC QN AUTO: 24 PG (ref 26.5–33)
MCHC RBC AUTO-ENTMCNC: 30.4 G/DL (ref 31.5–36.5)
MCV RBC AUTO: 79 FL (ref 78–100)
MONOCYTES # BLD AUTO: 0.2 10E9/L (ref 0–1.3)
MONOCYTES NFR BLD AUTO: 15.1 %
NEUTROPHILS # BLD AUTO: 0.8 10E9/L (ref 1.6–8.3)
NEUTROPHILS NFR BLD AUTO: 54.7 %
NRBC # BLD AUTO: 0 10*3/UL
NRBC BLD AUTO-RTO: 0 /100
PLATELET # BLD AUTO: 160 10E9/L (ref 150–450)
RBC # BLD AUTO: 4.16 10E12/L (ref 3.8–5.2)
TIBC SERPL-MCNC: 356 UG/DL (ref 240–430)
WBC # BLD AUTO: 1.4 10E9/L (ref 4–11)

## 2021-06-17 PROCEDURE — 86160 COMPLEMENT ANTIGEN: CPT | Mod: 90 | Performed by: PATHOLOGY

## 2021-06-17 PROCEDURE — 86225 DNA ANTIBODY NATIVE: CPT | Mod: 90 | Performed by: PATHOLOGY

## 2021-06-17 PROCEDURE — 85652 RBC SED RATE AUTOMATED: CPT | Performed by: PATHOLOGY

## 2021-06-17 PROCEDURE — 36415 COLL VENOUS BLD VENIPUNCTURE: CPT | Performed by: PATHOLOGY

## 2021-06-17 PROCEDURE — 83540 ASSAY OF IRON: CPT | Performed by: PATHOLOGY

## 2021-06-17 PROCEDURE — 85025 COMPLETE CBC W/AUTO DIFF WBC: CPT | Performed by: PATHOLOGY

## 2021-06-17 PROCEDURE — 83550 IRON BINDING TEST: CPT | Performed by: PATHOLOGY

## 2021-06-17 PROCEDURE — 82306 VITAMIN D 25 HYDROXY: CPT | Mod: 90 | Performed by: PATHOLOGY

## 2021-06-17 PROCEDURE — 84460 ALANINE AMINO (ALT) (SGPT): CPT | Performed by: PATHOLOGY

## 2021-06-17 PROCEDURE — 82728 ASSAY OF FERRITIN: CPT | Performed by: PATHOLOGY

## 2021-06-17 PROCEDURE — 86140 C-REACTIVE PROTEIN: CPT | Performed by: PATHOLOGY

## 2021-06-17 PROCEDURE — 82040 ASSAY OF SERUM ALBUMIN: CPT | Performed by: PATHOLOGY

## 2021-06-17 PROCEDURE — 82565 ASSAY OF CREATININE: CPT | Performed by: PATHOLOGY

## 2021-06-17 PROCEDURE — 84450 TRANSFERASE (AST) (SGOT): CPT | Performed by: PATHOLOGY

## 2021-06-18 ENCOUNTER — TELEPHONE (OUTPATIENT)
Dept: RHEUMATOLOGY | Facility: CLINIC | Age: 26
End: 2021-06-18

## 2021-06-18 LAB
C3 SERPL-MCNC: 65 MG/DL (ref 81–157)
C4 SERPL-MCNC: 5 MG/DL (ref 13–39)
DEPRECATED CALCIDIOL+CALCIFEROL SERPL-MC: 34 UG/L (ref 20–75)
DSDNA AB SER-ACNC: 63 IU/ML

## 2021-06-18 NOTE — TELEPHONE ENCOUNTER
Called and spoke with Fer, pharmacist, need to clarify if pt will need pre-meds with infusion.      Will need to have signed infusion orders sent to [facility].  They will send orders to be signed and I will make sure that they get signed and sent back on Monday.    Manisha Michael RN  Rheumatology Clinic

## 2021-06-18 NOTE — TELEPHONE ENCOUNTER
Health Call Center    Phone Message    May a detailed message be left on voicemail: yes, Fer,     [phone]  Reason for Call: Medication Question or concern regarding medication   Prescription Clarification  Name of Medication: Benlysta infusion  Prescribing Provider: Dr Irby   Pharmacy: [facility]    What on the order needs clarification? Fer would like to talk to someone in regards to the prescription. They would like to verify the last dose. Reaction medications.     Action Taken: Message routed to:  [facility] ([facility]    Travel Screening: Not Applicable

## 2021-06-20 NOTE — RESULT ENCOUNTER NOTE
Improved anti-DNA. White count has slightly dropped but iron is so low, are you taking iron pills. Please continue taking vit D 50, 000 units a week to keep vit D level at 40.

## 2021-06-21 ENCOUNTER — MYC REFILL (OUTPATIENT)
Dept: RHEUMATOLOGY | Facility: CLINIC | Age: 26
End: 2021-06-21

## 2021-06-21 DIAGNOSIS — M32.9 SYSTEMIC LUPUS ERYTHEMATOSUS, UNSPECIFIED SLE TYPE, UNSPECIFIED ORGAN INVOLVEMENT STATUS (H): ICD-10-CM

## 2021-06-21 NOTE — LETTER
Yvonne Barron  [address]   [place] [address]    Dear Yvonne Barron,     Regular eye exams are required while taking hydroxychloroquine (Plaquenil). Eye exams should be completed by an eye specialist who is experienced in monitoring for hydroxychloroquine toxicity (a rare effect of the drug that can damage your eyes and vision).  These may be yearly or as determined by your eye specialist.     Although vision problems and loss of sight while taking hydroxychloroquine are very rare, notify your doctor immediately if you notice changes in your vision. The goal of screening is to detect toxicity before your vision is significantly or noticeably impacted. Failing to get proper screening exams puts you at risk of vision changes which may or may not be reversible.    Per the American Academy of Ophthalmology recommendations (2016), screening tests performed may include a 10-2 visual field test, spectral-domain optical coherence tomography (SD OCT), or other screening tests as determined by the eye specialist, including a multifocal electroretinogram (mfERG) or fundus auto-fluorescence (FAF).    We received a refill request from your pharmacy. A copy of your current eye exam was not found in your medical record. Your hydroxychloroquine prescription has been refilled with a limited supply pending confirmation you have had an eye exam to test for hydroxychloroquine toxicity.      We encourage you to bring this letter to your eye exam to discuss the exams that will be performed during your visit. Please request your eye clinic fax or mail a copy of your eye exam report to our clinic indicating that testing was completed for hydroxychloroquine toxicity screening. The exam notes must specifically comment on the potential for hydroxychloroquine toxicity and outline recommended follow-up.    If you have questions about hydroxychloroquine or the information in this letter, please call the clinic at  [phone] or talk to your provider at your next office visit.                        2    Eye Specialist Letter  Dear Eye Specialist,  To ensure safe use of Plaquenil (hydroxychloroquine), we are requesting your assistance in providing the following information. Please return this form to our clinic via mail or fax, or incorporate this information into your visit summary. Your note must indicate whether or not there is evidence of toxicity from Plaquenil use. For questions regarding this form, please call [phone].  Patient Name:   :             Date of Exam:    The following exams were completed during this visit in accordance with the American Academy of Ophthalmology recommendations (2016):  ? 10-2 automated visual field  ? Spectral-domain optical coherence tomography (SD OCT)  ? Multifocal electroretinogram (mfERG)  ? Fundus autofluorescence (FAF)  ? Other (please specify)  Please select from the following:  ? The findings from the above exams are not suggestive of toxicity from Plaquenil (hydroxychloroquine).  ? The findings from the above exams may suggest toxicity from Plaquenil (hydroxychloroquine).  Directly contact the clinic and fax this form.  Please provide additional guidance on whether or not the medication may be continued from your perspective:  Date of next recommended Plaquenil (hydroxychloroquine) screening eye exam:   ? 5 years  ? 1 year  ? 6 months  Other (please specify):   Eye Specialist Name (print):                                                                Date:  Eye Specialist Signature:

## 2021-06-23 RX ORDER — HYDROXYCHLOROQUINE SULFATE 200 MG/1
TABLET, FILM COATED ORAL
Qty: 120 TABLET | Refills: 0 | Status: SHIPPED | OUTPATIENT
Start: 2021-06-23 | End: 2021-10-06

## 2021-06-23 NOTE — TELEPHONE ENCOUNTER
hydroxychloroquine (PLAQUENIL) 200 MG tablet  Last Written Prescription Date:  12/27/2020  Last Fill Quantity: 120,   # refills: 0  Last Office Visit : 5/7/2021  Future Office visit:  11/17/2021  Last eye exam:  None    Routing refill request to provider for review/approval because:  Needing updated eye exam        Letter sent      Provider notified       Safia Reyes RN  Central Triage Red Flags/Med Refills

## 2021-06-24 ENCOUNTER — INFUSION THERAPY VISIT (OUTPATIENT)
Dept: INFUSION THERAPY | Facility: CLINIC | Age: 26
End: 2021-06-24
Attending: INTERNAL MEDICINE
Payer: COMMERCIAL

## 2021-06-24 VITALS
HEART RATE: 86 BPM | RESPIRATION RATE: 16 BRPM | WEIGHT: 118.8 LBS | OXYGEN SATURATION: 100 % | TEMPERATURE: 98.7 F | DIASTOLIC BLOOD PRESSURE: 53 MMHG | SYSTOLIC BLOOD PRESSURE: 85 MMHG | BODY MASS INDEX: 19.17 KG/M2

## 2021-06-24 DIAGNOSIS — M32.9 SYSTEMIC LUPUS ERYTHEMATOSUS, UNSPECIFIED SLE TYPE, UNSPECIFIED ORGAN INVOLVEMENT STATUS (H): Primary | ICD-10-CM

## 2021-06-24 DIAGNOSIS — Z51.81 MEDICATION MONITORING ENCOUNTER: ICD-10-CM

## 2021-06-24 LAB
CREAT UR-MCNC: 147 MG/DL
PROT UR-MCNC: 0.54 G/L
PROT/CREAT 24H UR: 0.37 G/G CR (ref 0–0.2)

## 2021-06-24 PROCEDURE — 96375 TX/PRO/DX INJ NEW DRUG ADDON: CPT

## 2021-06-24 PROCEDURE — 96374 THER/PROPH/DIAG INJ IV PUSH: CPT

## 2021-06-24 PROCEDURE — 250N000013 HC RX MED GY IP 250 OP 250 PS 637: Performed by: INTERNAL MEDICINE

## 2021-06-24 PROCEDURE — 258N000003 HC RX IP 258 OP 636: Performed by: INTERNAL MEDICINE

## 2021-06-24 PROCEDURE — 250N000011 HC RX IP 250 OP 636: Performed by: INTERNAL MEDICINE

## 2021-06-24 PROCEDURE — 36415 COLL VENOUS BLD VENIPUNCTURE: CPT

## 2021-06-24 PROCEDURE — 84156 ASSAY OF PROTEIN URINE: CPT | Performed by: INTERNAL MEDICINE

## 2021-06-24 RX ORDER — DIPHENHYDRAMINE HCL 25 MG
25 CAPSULE ORAL ONCE
Status: COMPLETED | OUTPATIENT
Start: 2021-06-24 | End: 2021-06-24

## 2021-06-24 RX ORDER — HEPARIN SODIUM (PORCINE) LOCK FLUSH IV SOLN 100 UNIT/ML 100 UNIT/ML
5 SOLUTION INTRAVENOUS
Status: CANCELLED | OUTPATIENT
Start: 2021-07-20

## 2021-06-24 RX ORDER — HEPARIN SODIUM,PORCINE 10 UNIT/ML
5 VIAL (ML) INTRAVENOUS
Status: CANCELLED | OUTPATIENT
Start: 2021-07-20

## 2021-06-24 RX ORDER — ACETAMINOPHEN 325 MG/1
650 TABLET ORAL ONCE
Status: COMPLETED | OUTPATIENT
Start: 2021-06-24 | End: 2021-06-24

## 2021-06-24 RX ORDER — ACETAMINOPHEN 325 MG/1
650 TABLET ORAL ONCE
Status: CANCELLED
Start: 2021-07-20

## 2021-06-24 RX ORDER — METHYLPREDNISOLONE SODIUM SUCCINATE 125 MG/2ML
125 INJECTION, POWDER, LYOPHILIZED, FOR SOLUTION INTRAMUSCULAR; INTRAVENOUS ONCE
Status: COMPLETED | OUTPATIENT
Start: 2021-06-24 | End: 2021-06-24

## 2021-06-24 RX ORDER — DIPHENHYDRAMINE HCL 25 MG
25 CAPSULE ORAL ONCE
Status: CANCELLED
Start: 2021-07-20

## 2021-06-24 RX ORDER — METHYLPREDNISOLONE SODIUM SUCCINATE 125 MG/2ML
125 INJECTION, POWDER, LYOPHILIZED, FOR SOLUTION INTRAMUSCULAR; INTRAVENOUS ONCE
Status: CANCELLED | OUTPATIENT
Start: 2021-07-20

## 2021-06-24 RX ADMIN — ACETAMINOPHEN 650 MG: 325 TABLET ORAL at 15:27

## 2021-06-24 RX ADMIN — BELIMUMAB 520 MG: 400 INJECTION, POWDER, LYOPHILIZED, FOR SOLUTION INTRAVENOUS at 16:11

## 2021-06-24 RX ADMIN — METHYLPREDNISOLONE SODIUM SUCCINATE 125 MG: 125 INJECTION, POWDER, FOR SOLUTION INTRAMUSCULAR; INTRAVENOUS at 15:28

## 2021-06-24 RX ADMIN — DIPHENHYDRAMINE HYDROCHLORIDE 25 MG: 25 CAPSULE ORAL at 15:27

## 2021-06-24 NOTE — PROGRESS NOTES
Chief Complaint   Patient presents with     Infusion     IV Benlysta     Infusion Nursing Note:  Yvonne Barron presents today for IV Benlysta.    Patient seen by provider today: No   present during visit today: Not Applicable.    Note: Benlysta given over 60 minutes at 282 ml/hr.  Patient did not meet criteria for an asymptomatic covid-19 PCR test in infusion today. Patient declined the covid-19 test.    Intravenous Access:  Peripheral IV placed.    Premeds given as ordered. UA collected as ordered.     Treatment Conditions:  Biological Infusion Checklist:  ~~~ NOTE: If the patient answers yes to any of the questions below, hold the infusion and contact ordering provider or on-call provider.    1. Have you recently had an elevated temperature, fever, chills, productive cough, coughing for 3 weeks or longer or hemoptysis, abnormal vital signs, night sweats,  chest pain or have you noticed a decrease in your appetite, unexplained weight loss or fatigue? No  2. Do you have any open wounds or new incisions? No  3. Do you have any recent or upcoming hospitalizations, surgeries or dental procedures? No  4. Do you currently have or recently have had any signs of illness or infection or are you on any antibiotics? No  5. Have you had any new, sudden or worsening abdominal pain? No  6. Have you or anyone in your household received a live vaccination in the past 4 weeks? Please note:  No live vaccines while on biologic/chemotherapy until 6 months after the last treatment.  Patient can receive the flu vaccine (shot only) and the pneumovax.  It is optimal for the patient to get these vaccines mid cycle, but they can be given at any time as long as it is not on the day of the infusion. No  7. Have you recently been diagnosed with any new nervous system diseases (ie. Multiple sclerosis, Guillain [place], seizures, neurological changes) or cancer diagnosis? No  8. Are you on any form of radiation or chemotherapy?  "No  9. Are you pregnant or breast feeding or do you have plans of pregnancy in the future? No  10. Have you been having any signs of worsening depression or suicidal ideations?  (benlysta only) No  11. Have there been any other new onset medical symptoms? No    Post Infusion Assessment:  Patient tolerated infusion without incident.  Site patent and intact, free from redness, edema or discomfort.  No evidence of extravasations.  Access discontinued per protocol.     Discharge Plan:   Discharge instructions reviewed with: Patient.  AVS to patient via [employer].  Patient will return in 1 month for next appointment.       Administrations This Visit     acetaminophen (TYLENOL) tablet 650 mg     Admin Date  06/24/2021 Action  Given Dose  650 mg Route  Oral Administered By  Jolynn Jimenes RN          belimumab (BENLYSTA) 520 mg in sodium chloride 0.9 % 282 mL INFUSION     Admin Date  06/24/2021 Action  New Bag Dose  520 mg Rate  282 mL/hr Route  Intravenous Administered By  Jolynn Jimenes RN          diphenhydrAMINE (BENADRYL) capsule 25 mg     Admin Date  06/24/2021 Action  Given Dose  25 mg Route  Oral Administered By  Jolynn Jimenes RN          methylPREDNISolone sodium succinate (solu-MEDROL) injection 125 mg     Admin Date  06/24/2021 Action  Given Dose  125 mg Route  Intravenous Administered By  Jolynn Jimenes RN              Vital signs:  Temp: 98.7  F (37.1  C) Temp src: Oral BP: 96/64 Pulse: 96   Resp: 16 SpO2: 100 % O2 Device: None (Room air)     Weight: 53.9 kg (118 lb 12.8 oz)  Estimated body mass index is 19.17 kg/m  as calculated from the following:    Height as of 5/7/21: 1.676 m (5' 6\").    Weight as of this encounter: 53.9 kg (118 lb 12.8 oz).                        "

## 2021-06-24 NOTE — LETTER
6/24/2021         RE: Yvonne Barron  [address]  [place] [address]        Dear Colleague,    Thank you for referring your patient, Yvonne Barron, to the [facility]. Please see a copy of my visit note below.    Chief Complaint   Patient presents with     Infusion     IV Benlysta     Infusion Nursing Note:  Yvonne Barron presents today for IV Benlysta.    Patient seen by provider today: No   present during visit today: Not Applicable.    Note: Benlysta given over 60 minutes at 282 ml/hr.  Patient did not meet criteria for an asymptomatic covid-19 PCR test in infusion today. Patient declined the covid-19 test.    Intravenous Access:  Peripheral IV placed.    Premeds given as ordered. UA collected as ordered.     Treatment Conditions:  Biological Infusion Checklist:  ~~~ NOTE: If the patient answers yes to any of the questions below, hold the infusion and contact ordering provider or on-call provider.    1. Have you recently had an elevated temperature, fever, chills, productive cough, coughing for 3 weeks or longer or hemoptysis, abnormal vital signs, night sweats,  chest pain or have you noticed a decrease in your appetite, unexplained weight loss or fatigue? No  2. Do you have any open wounds or new incisions? No  3. Do you have any recent or upcoming hospitalizations, surgeries or dental procedures? No  4. Do you currently have or recently have had any signs of illness or infection or are you on any antibiotics? No  5. Have you had any new, sudden or worsening abdominal pain? No  6. Have you or anyone in your household received a live vaccination in the past 4 weeks? Please note:  No live vaccines while on biologic/chemotherapy until 6 months after the last treatment.  Patient can receive the flu vaccine (shot only) and the pneumovax.  It is optimal for the patient to get these vaccines mid cycle, but they can be given  "at any time as long as it is not on the day of the infusion. No  7. Have you recently been diagnosed with any new nervous system diseases (ie. Multiple sclerosis, Guillain [place], seizures, neurological changes) or cancer diagnosis? No  8. Are you on any form of radiation or chemotherapy? No  9. Are you pregnant or breast feeding or do you have plans of pregnancy in the future? No  10. Have you been having any signs of worsening depression or suicidal ideations?  (benlysta only) No  11. Have there been any other new onset medical symptoms? No    Post Infusion Assessment:  Patient tolerated infusion without incident.  Site patent and intact, free from redness, edema or discomfort.  No evidence of extravasations.  Access discontinued per protocol.     Discharge Plan:   Discharge instructions reviewed with: Patient.  AVS to patient via [employer].  Patient will return in 1 month for next appointment.       Administrations This Visit     acetaminophen (TYLENOL) tablet 650 mg     Admin Date  06/24/2021 Action  Given Dose  650 mg Route  Oral Administered By  Jolynn Jimenes RN          belimumab (BENLYSTA) 520 mg in sodium chloride 0.9 % 282 mL INFUSION     Admin Date  06/24/2021 Action  New Bag Dose  520 mg Rate  282 mL/hr Route  Intravenous Administered By  Jolynn Jimenes RN          diphenhydrAMINE (BENADRYL) capsule 25 mg     Admin Date  06/24/2021 Action  Given Dose  25 mg Route  Oral Administered By  Jolynn Jimenes RN          methylPREDNISolone sodium succinate (solu-MEDROL) injection 125 mg     Admin Date  06/24/2021 Action  Given Dose  125 mg Route  Intravenous Administered By  Jolynn Jimenes RN              Vital signs:  Temp: 98.7  F (37.1  C) Temp src: Oral BP: 96/64 Pulse: 96   Resp: 16 SpO2: 100 % O2 Device: None (Room air)     Weight: 53.9 kg (118 lb 12.8 oz)  Estimated body mass index is 19.17 kg/m  as calculated from the following:    Height as of 5/7/21: 1.676 m (5' 6\").    Weight as of this " encounter: 53.9 kg (118 lb 12.8 oz).                            Again, thank you for allowing me to participate in the care of your patient.        Sincerely,        [facility]

## 2021-06-28 DIAGNOSIS — M32.9 SYSTEMIC LUPUS ERYTHEMATOSUS, UNSPECIFIED SLE TYPE, UNSPECIFIED ORGAN INVOLVEMENT STATUS (H): ICD-10-CM

## 2021-06-29 RX ORDER — MYCOPHENOLATE MOFETIL 500 MG/1
500 TABLET ORAL 2 TIMES DAILY
Qty: 180 TABLET | Refills: 0 | Status: SHIPPED | OUTPATIENT
Start: 2021-06-29 | End: 2022-01-03

## 2021-06-29 NOTE — TELEPHONE ENCOUNTER
mycophenolate (GENERIC EQUIVALENT) 500 MG tablet      Last Written Prescription Date:  12-1-2020  Last Fill Quantity: 120,   # refills: 3  Last Office Visit: 5-7-2021  Future Office visit:  11-    CBC RESULTS:   Recent Labs   Lab Test 06/17/21  1255   WBC 1.4*   RBC 4.16   HGB 10.0*   HCT 32.9*   MCV 79   MCH 24.0*   MCHC 30.4*   RDW 16.2*          Creatinine   Date Value Ref Range Status   06/17/2021 0.50 (L) 0.52 - 1.04 mg/dL Final   ]    Liver Function Studies -   Recent Labs   Lab Test 06/17/21  1255 03/13/20  2343 03/13/20  2343   PROTTOTAL  --   --  7.8   ALBUMIN 3.6   < > 3.3*   BILITOTAL  --   --  0.2   ALKPHOS  --   --  73   AST 10   < > 14   ALT 10   < > 10    < > = values in this interval not displayed.       Routing refill request to provider for review/approval because:  Not on protocol.        Kathleen M Doege RN

## 2021-07-01 NOTE — TELEPHONE ENCOUNTER
Patient is calling needing to speak with Manisha. She has several questions regarding details of the infusion orders requests and would like a call back to discuss. [id number]

## 2021-07-01 NOTE — TELEPHONE ENCOUNTER
Discussed with pt, needed to wait on form from Maria T.  She understands, she got her infusion at the infusion center last week. She will get her home infusion with her next infusion.    Manisha Michael RN  [facility]

## 2021-07-28 DIAGNOSIS — M79.605 PAIN IN BOTH LOWER EXTREMITIES: ICD-10-CM

## 2021-07-28 DIAGNOSIS — M79.604 PAIN IN BOTH LOWER EXTREMITIES: ICD-10-CM

## 2021-07-29 RX ORDER — TRAMADOL HYDROCHLORIDE 50 MG/1
50 TABLET ORAL EVERY 12 HOURS PRN
Qty: 60 TABLET | Refills: 3 | Status: SHIPPED | OUTPATIENT
Start: 2021-07-29 | End: 2022-02-24

## 2021-07-29 NOTE — TELEPHONE ENCOUNTER
traMADol (ULTRAM) 50 MG tablet   Take 1 tablet (50 mg) by mouth every 12 hours as needed for severe pain No driving or alcohol use while taking medication     Last Written Prescription Date:  1/27/21  Last Fill Quantity: 60,   # refills: 3  Last Office Visit : 5/7/21  Future Office visit:  11/17/21    Routing refill request to provider for review/approval because:  Drug not on the [facility], [facility] or [facility] refill protocol or controlled substance

## 2021-08-26 DIAGNOSIS — M79.605 PAIN IN BOTH LOWER EXTREMITIES: ICD-10-CM

## 2021-08-26 DIAGNOSIS — M79.604 PAIN IN BOTH LOWER EXTREMITIES: ICD-10-CM

## 2021-08-31 NOTE — TELEPHONE ENCOUNTER
tiZANidine (ZANAFLEX) 4 MG tablet  Last Written Prescription Date:  3/30/21  Last Fill Quantity: 60,   # refills: 3  Last Office Visit : 5/7/21  Future Office visit: 11/17/21    Routing refill request to provider for review/approval because: not on protocol

## 2021-09-21 DIAGNOSIS — E55.9 VITAMIN D DEFICIENCY: ICD-10-CM

## 2021-09-22 NOTE — TELEPHONE ENCOUNTER
vitamin D2 (ERGOCALCIFEROL) 50115 units (1250 mcg) capsule    Take 1 capsule (50,000 Units) by mouth every 7 days     Last Written Prescription Date:  6/13/21  Last Fill Quantity: 12 capsules,   # refills: 0  Last Office Visit : 5/7/21  Future Office visit:  11/17/21    Routing refill request to provider for review/approval because:  Drug not on the [facility], [facility] or [facility] refill protocol or controlled substance

## 2021-09-23 RX ORDER — ERGOCALCIFEROL 1.25 MG/1
50000 CAPSULE, LIQUID FILLED ORAL
Qty: 12 CAPSULE | Refills: 0 | Status: SHIPPED | OUTPATIENT
Start: 2021-09-23

## 2021-10-05 DIAGNOSIS — M32.9 SYSTEMIC LUPUS ERYTHEMATOSUS, UNSPECIFIED SLE TYPE, UNSPECIFIED ORGAN INVOLVEMENT STATUS (H): ICD-10-CM

## 2021-10-05 NOTE — LETTER
Yvonne Barron  [address]   [place] [address]    Dear Yvonne Barron,     Regular eye exams are required while taking hydroxychloroquine (Plaquenil). Eye exams should be completed by an eye specialist who is experienced in monitoring for hydroxychloroquine toxicity (a rare effect of the drug that can damage your eyes and vision).  These may be yearly or as determined by your eye specialist.     Although vision problems and loss of sight while taking hydroxychloroquine are very rare, notify your doctor immediately if you notice changes in your vision. The goal of screening is to detect toxicity before your vision is significantly or noticeably impacted. Failing to get proper screening exams puts you at risk of vision changes which may or may not be reversible.    Per the American Academy of Ophthalmology recommendations (2016), screening tests performed may include a 10-2 visual field test, spectral-domain optical coherence tomography (SD OCT), or other screening tests as determined by the eye specialist, including a multifocal electroretinogram (mfERG) or fundus auto-fluorescence (FAF).    We received a refill request from your pharmacy. A copy of your current eye exam was not found in your medical record. Your hydroxychloroquine prescription has been refilled with a limited supply pending confirmation you have had an eye exam to test for hydroxychloroquine toxicity.      We encourage you to bring this letter to your eye exam to discuss the exams that will be performed during your visit. Please request your eye clinic fax or mail a copy of your eye exam report to our clinic indicating that testing was completed for hydroxychloroquine toxicity screening. The exam notes must specifically comment on the potential for hydroxychloroquine toxicity and outline recommended follow-up.    If you have questions about hydroxychloroquine or the information in this letter, please call the clinic at  [phone] or talk to your provider at your next office visit.                        2    Eye Specialist Letter  Dear Eye Specialist,  To ensure safe use of Plaquenil (hydroxychloroquine), we are requesting your assistance in providing the following information. Please return this form to our clinic via mail or fax, or incorporate this information into your visit summary. Your note must indicate whether or not there is evidence of toxicity from Plaquenil use. For questions regarding this form, please call [phone].  Patient Name:   :             Date of Exam:    The following exams were completed during this visit in accordance with the American Academy of Ophthalmology recommendations (2016):  ? 10-2 automated visual field  ? Spectral-domain optical coherence tomography (SD OCT)  ? Multifocal electroretinogram (mfERG)  ? Fundus autofluorescence (FAF)  ? Other (please specify)  Please select from the following:  ? The findings from the above exams are not suggestive of toxicity from Plaquenil (hydroxychloroquine).  ? The findings from the above exams may suggest toxicity from Plaquenil (hydroxychloroquine).  Directly contact the clinic and fax this form.  Please provide additional guidance on whether or not the medication may be continued from your perspective:  Date of next recommended Plaquenil (hydroxychloroquine) screening eye exam:   ? 5 years  ? 1 year  ? 6 months  Other (please specify):   Eye Specialist Name (print):                                                                Date:  Eye Specialist Signature:

## 2021-10-06 NOTE — TELEPHONE ENCOUNTER
HYDROXYCHLOROQUINE 200MG TABLETS  Plaquenil      Last Written Prescription Date:  6/23/2021  Last Fill Quantity: 120,   # refills: 0  Last Office Visit: 5/7/2021  Future Office visit: 11/17/2020  Last Eye Exam: No updated eye exam on file anywhere.       Routing refill request to provider for review/approval because:  Needing eye exam      Letter sent    Provider notified       Safia Reyes RN  Central Triage Red Flags/Med Refills

## 2021-10-10 ENCOUNTER — HEALTH MAINTENANCE LETTER (OUTPATIENT)
Age: 26
End: 2021-10-10

## 2021-10-11 RX ORDER — HYDROXYCHLOROQUINE SULFATE 200 MG/1
TABLET, FILM COATED ORAL
Qty: 120 TABLET | Refills: 0 | Status: SHIPPED | OUTPATIENT
Start: 2021-10-11 | End: 2021-12-11

## 2021-10-11 NOTE — TELEPHONE ENCOUNTER
Reminder sent to patient via [employer].    Mirna Gregory CMA   10/11/2021 3:44 PM

## 2021-10-15 ENCOUNTER — TELEPHONE (OUTPATIENT)
Dept: RHEUMATOLOGY | Facility: CLINIC | Age: 26
End: 2021-10-15

## 2021-10-15 DIAGNOSIS — M32.9 SYSTEMIC LUPUS ERYTHEMATOSUS, UNSPECIFIED SLE TYPE, UNSPECIFIED ORGAN INVOLVEMENT STATUS (H): Primary | ICD-10-CM

## 2021-10-15 NOTE — TELEPHONE ENCOUNTER
Patient has an upcoming appt with Dr. Irby and would like to get labs done prior. Patient would like to speak to clinical team about specific tests she would like ordered.

## 2021-10-19 NOTE — TELEPHONE ENCOUNTER
Called and spoke with pt, labs ordered for Monday November 15th.     Manisha Michael RN  [facility]

## 2021-11-15 ENCOUNTER — LAB (OUTPATIENT)
Dept: LAB | Facility: CLINIC | Age: 26
End: 2021-11-15
Payer: COMMERCIAL

## 2021-11-15 DIAGNOSIS — M32.9 SYSTEMIC LUPUS ERYTHEMATOSUS, UNSPECIFIED SLE TYPE, UNSPECIFIED ORGAN INVOLVEMENT STATUS (H): ICD-10-CM

## 2021-11-15 DIAGNOSIS — Z51.81 MEDICATION MONITORING ENCOUNTER: ICD-10-CM

## 2021-11-15 LAB
ALBUMIN SERPL-MCNC: 3.9 G/DL (ref 3.4–5)
ALBUMIN UR-MCNC: 30 MG/DL
ALT SERPL W P-5'-P-CCNC: 14 U/L (ref 0–50)
APPEARANCE UR: ABNORMAL
AST SERPL W P-5'-P-CCNC: 9 U/L (ref 0–45)
BASOPHILS # BLD AUTO: 0 10E3/UL (ref 0–0.2)
BASOPHILS NFR BLD AUTO: 1 %
BILIRUB UR QL STRIP: NEGATIVE
COLOR UR AUTO: YELLOW
CREAT SERPL-MCNC: 0.55 MG/DL (ref 0.52–1.04)
CREAT UR-MCNC: 334 MG/DL
CRP SERPL-MCNC: <2.9 MG/L (ref 0–8)
DEPRECATED CALCIDIOL+CALCIFEROL SERPL-MC: 65 UG/L (ref 20–75)
EOSINOPHIL # BLD AUTO: 0 10E3/UL (ref 0–0.7)
EOSINOPHIL NFR BLD AUTO: 1 %
ERYTHROCYTE [DISTWIDTH] IN BLOOD BY AUTOMATED COUNT: 14.9 % (ref 10–15)
ERYTHROCYTE [SEDIMENTATION RATE] IN BLOOD BY WESTERGREN METHOD: 23 MM/HR (ref 0–20)
GFR SERPL CREATININE-BSD FRML MDRD: >90 ML/MIN/1.73M2
GLUCOSE UR STRIP-MCNC: NEGATIVE MG/DL
HCT VFR BLD AUTO: 41.9 % (ref 35–47)
HGB BLD-MCNC: 13.2 G/DL (ref 11.7–15.7)
HGB UR QL STRIP: NEGATIVE
IMM GRANULOCYTES # BLD: 0 10E3/UL
IMM GRANULOCYTES NFR BLD: 0 %
KETONES UR STRIP-MCNC: 5 MG/DL
LEUKOCYTE ESTERASE UR QL STRIP: NEGATIVE
LYMPHOCYTES # BLD AUTO: 0.4 10E3/UL (ref 0.8–5.3)
LYMPHOCYTES NFR BLD AUTO: 25 %
MCH RBC QN AUTO: 28.6 PG (ref 26.5–33)
MCHC RBC AUTO-ENTMCNC: 31.5 G/DL (ref 31.5–36.5)
MCV RBC AUTO: 91 FL (ref 78–100)
MONOCYTES # BLD AUTO: 0.2 10E3/UL (ref 0–1.3)
MONOCYTES NFR BLD AUTO: 12 %
MUCOUS THREADS #/AREA URNS LPF: PRESENT /LPF
NEUTROPHILS # BLD AUTO: 1 10E3/UL (ref 1.6–8.3)
NEUTROPHILS NFR BLD AUTO: 61 %
NITRATE UR QL: NEGATIVE
NRBC # BLD AUTO: 0 10E3/UL
NRBC BLD AUTO-RTO: 0 /100
PH UR STRIP: 5 [PH] (ref 5–7)
PLATELET # BLD AUTO: 149 10E3/UL (ref 150–450)
PROT UR-MCNC: 1.14 G/L
PROT/CREAT 24H UR: 0.34 G/G CR (ref 0–0.2)
RBC # BLD AUTO: 4.61 10E6/UL (ref 3.8–5.2)
RBC URINE: 2 /HPF
SP GR UR STRIP: 1.03 (ref 1–1.03)
UROBILINOGEN UR STRIP-MCNC: NORMAL MG/DL
WBC # BLD AUTO: 1.7 10E3/UL (ref 4–11)
WBC URINE: 1 /HPF

## 2021-11-15 PROCEDURE — 83550 IRON BINDING TEST: CPT | Mod: 90 | Performed by: PATHOLOGY

## 2021-11-15 PROCEDURE — 85025 COMPLETE CBC W/AUTO DIFF WBC: CPT | Performed by: PATHOLOGY

## 2021-11-15 PROCEDURE — 82040 ASSAY OF SERUM ALBUMIN: CPT | Performed by: PATHOLOGY

## 2021-11-15 PROCEDURE — 82306 VITAMIN D 25 HYDROXY: CPT | Mod: 90 | Performed by: PATHOLOGY

## 2021-11-15 PROCEDURE — 84156 ASSAY OF PROTEIN URINE: CPT | Performed by: PATHOLOGY

## 2021-11-15 PROCEDURE — 82565 ASSAY OF CREATININE: CPT | Performed by: PATHOLOGY

## 2021-11-15 PROCEDURE — 86140 C-REACTIVE PROTEIN: CPT | Performed by: PATHOLOGY

## 2021-11-15 PROCEDURE — 86160 COMPLEMENT ANTIGEN: CPT | Mod: 90 | Performed by: PATHOLOGY

## 2021-11-15 PROCEDURE — 86225 DNA ANTIBODY NATIVE: CPT | Mod: 90 | Performed by: PATHOLOGY

## 2021-11-15 PROCEDURE — 84450 TRANSFERASE (AST) (SGOT): CPT | Performed by: PATHOLOGY

## 2021-11-15 PROCEDURE — 81001 URINALYSIS AUTO W/SCOPE: CPT | Performed by: PATHOLOGY

## 2021-11-15 PROCEDURE — 36415 COLL VENOUS BLD VENIPUNCTURE: CPT | Performed by: PATHOLOGY

## 2021-11-15 PROCEDURE — 85652 RBC SED RATE AUTOMATED: CPT | Performed by: PATHOLOGY

## 2021-11-15 PROCEDURE — 84460 ALANINE AMINO (ALT) (SGPT): CPT | Performed by: PATHOLOGY

## 2021-11-15 PROCEDURE — 82728 ASSAY OF FERRITIN: CPT | Mod: 90 | Performed by: PATHOLOGY

## 2021-11-16 LAB
C3 SERPL-MCNC: 78 MG/DL (ref 81–157)
C4 SERPL-MCNC: 6 MG/DL (ref 13–39)
DSDNA AB SER-ACNC: 60 IU/ML

## 2021-11-17 ENCOUNTER — OFFICE VISIT (OUTPATIENT)
Dept: RHEUMATOLOGY | Facility: CLINIC | Age: 26
End: 2021-11-17
Attending: INTERNAL MEDICINE
Payer: COMMERCIAL

## 2021-11-17 VITALS
BODY MASS INDEX: 18.99 KG/M2 | HEART RATE: 94 BPM | SYSTOLIC BLOOD PRESSURE: 112 MMHG | HEIGHT: 66 IN | DIASTOLIC BLOOD PRESSURE: 75 MMHG | OXYGEN SATURATION: 100 % | WEIGHT: 118.2 LBS

## 2021-11-17 DIAGNOSIS — Z51.81 MEDICATION MONITORING ENCOUNTER: ICD-10-CM

## 2021-11-17 DIAGNOSIS — M32.9 SYSTEMIC LUPUS ERYTHEMATOSUS, UNSPECIFIED SLE TYPE, UNSPECIFIED ORGAN INVOLVEMENT STATUS (H): Primary | ICD-10-CM

## 2021-11-17 DIAGNOSIS — I73.01 RAYNAUD'S PHENOMENON WITH GANGRENE (H): ICD-10-CM

## 2021-11-17 PROCEDURE — 99214 OFFICE O/P EST MOD 30 MIN: CPT | Performed by: INTERNAL MEDICINE

## 2021-11-17 PROCEDURE — G0463 HOSPITAL OUTPT CLINIC VISIT: HCPCS

## 2021-11-17 RX ORDER — SILDENAFIL CITRATE 20 MG/1
20 TABLET ORAL 3 TIMES DAILY
Qty: 90 TABLET | Refills: 3 | Status: SHIPPED | OUTPATIENT
Start: 2021-11-17

## 2021-11-17 ASSESSMENT — MIFFLIN-ST. JEOR: SCORE: 1282.9

## 2021-11-17 ASSESSMENT — PAIN SCALES - GENERAL: PAINLEVEL: NO PAIN (0)

## 2021-11-17 NOTE — LETTER
11/17/2021       RE: Yvonne Barron  [address]  [place] [address]     Dear Colleague,    Thank you for referring your patient, Yvonne Barron, to the [facility] [place] at [facility]. Please see a copy of my visit note below.    Rheumatology Clinic F/U Virtual Visit Note     Last seen: 1/27/2021  DOS: 5/7/2021     Reason for visit: SLE    HPI:  Yvonne Barron is a 25-year-old female with SLE diagnosed in 11/2018 (+dsDNA, low C3/C4, low WBC). Her past medical history is significant for Graves disease s/p radioactive iodine ablation in 2013 and post-ablation hypothyroidism. She initially presented with raynaud's, rash over face and legs, nasal and oral ulcers, weight loss of 30 lbs, and severe fatigue. Was found to have Leukopenia, thrombocytopenia (130), sed rate 45, SHANDA 1:320, Sm>8, SSA>8, RNP>8, DS DNA 17, low C3 C4. Seen today for follow-up on disease management with HCQ, cellcept, and benlysta infusions.      Today 5/7/2021: Patient reports that benlysta infusions are going well without side effects. Thinks it is helping. Modest improvement in appetite and weight gains since last visit. Now at approximately 116lbs. Still forcing herself to eat more, but also some increase in appetite. Was previously eating 1 meal today. Has found it helpful to instead eat 3 smaller meals per day.     Reports some non-localized joint pain/aches and fatigue. No major changes and not worse.    Received COVID vaccine in Feb/March. No side effects. 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    Wondering about Fe levels (last checked 1/2020) and would like to check again.        REVIEW OF SYMPTOMS:  A comprehensive ROS was done. Positives are per HPI.        HISTORY REVIEW:  Past Medical History:   Diagnosis Date     Hypothyroidism      Lupus (systemic lupus erythematosus) (H)      No past  surgical history on file.  No family history on file.  Social History     Socioeconomic History     Marital status: Single     Spouse name: Not on file     Number of children: Not on file     Years of education: Not on file     Highest education level: Not on file   Occupational History     Not on file   Tobacco Use     Smoking status: Never Smoker     Smokeless tobacco: Never Used   Substance and Sexual Activity     Alcohol use: No     Drug use: No     Sexual activity: Never   Other Topics Concern     Not on file   Social History Narrative     Not on file     Social Determinants of Health     Financial Resource Strain: Not on file   Food Insecurity: Not on file   Transportation Needs: Not on file   Physical Activity: Not on file   Stress: Not on file   Social Connections: Not on file   Intimate Partner Violence: Not on file   Housing Stability: Not on file     Patient Active Problem List   Diagnosis     Systemic lupus erythematosus (H)     No Known Allergies    I reviewed the Current Medications:  Outpatient Medications Prior to Visit   Medication Sig Dispense Refill     hydrocortisone, Perianal, (HYDROCORTISONE) 2.5 % cream Place rectally every morning 30 g 3     hydroquinone (CRISTA) 4 % external cream Apply to hyperpigmented areas twice a day 30 g 3     hydroxychloroquine (PLAQUENIL) 200 MG tablet TAKE 1 TABLET BY MOUTH TWICE DAILY ON MONDAY, WEDNESDAY AND FRIDAY. TAKE 1 TABLET ONCE DAILY THE REST OF THE WEEK 120 tablet 0     levothyroxine (SYNTHROID/LEVOTHROID) 75 MCG tablet Take 75 mcg by mouth daily       mycophenolate (GENERIC EQUIVALENT) 500 MG tablet Take 1 tablet (500 mg) by mouth 2 times daily 180 tablet 0     tiZANidine (ZANAFLEX) 4 MG tablet Take 1 tablet (4 mg) by mouth 3 times daily as needed for muscle spasms 60 tablet 3     traMADol (ULTRAM) 50 MG tablet Take 1 tablet (50 mg) by mouth every 12 hours as needed for severe pain 60 tablet 3     tretinoin (RETIN-A) 0.025 % external cream Use every  night.  Appt due 3/21. 45 g 7     vitamin D2 (ERGOCALCIFEROL) 44963 units (1250 mcg) capsule Take 1 capsule (50,000 Units) by mouth every 7 days 12 capsule 0     No facility-administered medications prior to visit.       Physical Exam:   GENERAL: alert and oriented, no acute distress  EYES: extraocular movements grossly intact, no conjunctivitis/scleritis, no discharge  ENT: normocephalic, no visible nasal discharge, no visible defect of external ears, no hearing deficits, no visible masses on neck  RESP: no audible wheezes or crackles, no cough  CV: unable to perform via video visit  ABDOMEN: unable to perform via video visit  SKIN: no visible edema, no rashes, no alopecia  PSYCH: positive affect, coherent speech  NEURO: No apparent CN defects, alert and oriented  MUSCULOSKELETAL: no synovitis, normal range of motion, able to form full fist    Recent lab results:    Labs Latest Ref Rng & Units 3/1/2021   WBC 4.0 - 11.0 10e9/L 1.6 (L)   RBC 3.8 - 5.2 10e12/L 4.04   HGB 11.7 - 15.7 g/dL 10.5 (L)   HCT 35.0 - 47.0 % 33.8 (L)   MCV 78 - 100 fl 84   MCH 26.5 - 33.0 pg 26.0 (L)   MCHC 31.5 - 36.5 g/dL 31.1 (L)   RDW 10.0 - 15.0 % 14.2    - 450 10e9/L 191   % NEUTROPHILS % 62.2   % LYMPHOCYTES % 26.3   % MONOCYTES % 10.3   % EOSINOPHILS % 0.0   % BASOPHILS % 0.6   ABSOLUTE NEUTROPHIL 1.6 - 8.3 10e9/L 1.0 (L)   ABSOLUTE LYMPHOCYTES 0.8 - 5.3 10e9/L 0.4 (L)   ABSOLUTE MONOCYTES 0.0 - 1.3 10e9/L 0.2   ABSOLUTE EOSINOPHILS 0.0 - 0.7 10e9/L 0.0   ABSOULTE BASOPHILS 0.0 - 0.2 10e9/L 0.0   AST 0 - 45 U/L 14   ALT 0 - 50 U/L 14   ALBUMIN 3.4 - 5.0 g/dL 3.5   CREATININE 0.52 - 1.04 mg/dL 0.53   GFR ESTIMATE >60 mL/min/1.73:m2 >90   GFR ESTIMATE, IF BLACK >60 mL/min/1.73:m2 >90   SED RATE 0 - 20 mm/h 54 (H)   CRP, INFLAMMATION 0.0 - 8.0 mg/L <2.9   PROTEIN TOTAL UR G/G CR 0 - 0.2 g/g Cr Unable to calculate due to low value   DNA-DS <10 IU/mL 91 (H)   COMPLEMENT C3 81 - 157 mg/dL 69 (L)   COMPLEMENT C4 13 - 39 mg/dL 6 (L)    CK TOTAL 30 - 225 U/L      Labs Latest Ref Rng & Units 1/4/2021   WBC 4.0 - 11.0 10e9/L 1.6 (L)   RBC 3.8 - 5.2 10e12/L 3.88   HGB 11.7 - 15.7 g/dL 10.9 (L)   HCT 35.0 - 47.0 % 35.3   MCV 78 - 100 fl 91   MCH 26.5 - 33.0 pg 28.1   MCHC 31.5 - 36.5 g/dL 30.9 (L)   RDW 10.0 - 15.0 % 13.0    - 450 10e9/L 165   % NEUTROPHILS % 66.5   % LYMPHOCYTES % 24.8   % MONOCYTES % 8.1   % EOSINOPHILS % 0.6   % BASOPHILS % 0.0   ABSOLUTE NEUTROPHIL 1.6 - 8.3 10e9/L 1.1 (L)   ABSOLUTE LYMPHOCYTES 0.8 - 5.3 10e9/L 0.4 (L)   ABSOLUTE MONOCYTES 0.0 - 1.3 10e9/L 0.1   ABSOLUTE EOSINOPHILS 0.0 - 0.7 10e9/L 0.0   ABSOULTE BASOPHILS 0.0 - 0.2 10e9/L 0.0   AST 0 - 45 U/L 14   ALT 0 - 50 U/L 12   ALBUMIN 3.4 - 5.0 g/dL 3.2 (L)   CREATININE 0.52 - 1.04 mg/dL 0.44 (L)   GFR ESTIMATE >60 mL/min/1.73:m2 >90   GFR ESTIMATE, IF BLACK >60 mL/min/1.73:m2 >90   SED RATE 0 - 20 mm/h 46 (H)   CRP, INFLAMMATION 0.0 - 8.0 mg/L <2.9   PROTEIN TOTAL UR G/G CR 0 - 0.2 g/g Cr    DNA-DS <10 IU/mL 145 (H)   COMPLEMENT C3 81 - 157 mg/dL 72 (L)   COMPLEMENT C4 13 - 39 mg/dL 3 (L)   CK TOTAL 30 - 225 U/L        ASSESSMENT and PLAN:    SLE with weight loss, active lupus serology (+anti-DNA, low C3/C4 3/2021), leukopenia (1.6 in all readings since 11/2020) but resolved chill julia lesions over toes. She is now up to 116 lbs from almost 100 lbs in 6/2020. We reduced cellcept from 3 to 2 gram a day in 6/2020 and we did further tapered to 1 gram a day at 11/2020 visit as it might be causing weight loss. We added benlysta at 11/2020 visit, she tolerates it well and feels it has helped with her disease.   Only current complaint is occasional arthralgia and fatigue. Recommended a trial of tramadol at last visit; risks were discussed.    Today 5/7/2021: Patient is stable with no major concerns. Benlysta infusions are going well. Weight gain is slow, but trending in the right direction. Will continue to hold off on further GI work-up based on continued progress.  Hgb was slightly below nl in last few labs. Will include Fe with today s lab orders and continue to monitor. Recent lab values are otherwise stable or slightly improved. We agreed to continue with current treatment plan.      Plan:    Continue benlysta monthly infusions (since 12/8/2020, peak benefit is 6 months)    Continue cellcept 500 mg bid (consider decreasing at next visit)    Labs in 6/2021 (including Fe and vitamin D) then [id number]    Tramadol 50 mg twice a day as needed for pain; risks were discussed    Continue plaquenil 2 tabs a day M/W/F, 1 tab a day the rest of the week (adjuted the dose given weight loss) with annual eye exam    GI follow up for weight loss (previously cancelled EGD/colonoscopy as gained weight by tapering cellcept - might do it in future if weight goes down again)    Follow up in 6 months      I saw and examined the patient with Dr. Irby. I acted as scribe for Dr. Irby.    Jeffrey Wahl MS    Attending Note: I saw and examined the patient with medical student Jeffrey . This note was written by him who acted as scribe for me. I agree with findings and recommendations written in this note. The note reflects decisions made by me.    No orders of the defined types were placed in this encounter.    Video visit start: 1:57pm  Video visit end: 2:17pm    [facility], pt is home    Joint pain neck legs knees under tramadol sleepy    118    [id number]          Again, thank you for allowing me to participate in the care of your patient.      Sincerely,    Ezio Irby MD

## 2021-11-17 NOTE — PATIENT INSTRUCTIONS
Tomorrow Dr. Quinonez at 9:30 am    Try revatio    If Dr. Tristan agrees, ok to go down on cellcept to 1 tab of 500 mg a day    Labs in 3 months    Return in 3-4 months

## 2021-11-17 NOTE — NURSING NOTE
"Chief Complaint   Patient presents with     RECHECK     Lupus       /75   Pulse 94   Ht 1.676 m (5' 6\")   Wt 53.6 kg (118 lb 3.2 oz)   SpO2 100%   BMI 19.08 kg/m       Christian Cohen MA  "

## 2021-11-17 NOTE — LETTER
11/17/2021      RE: Yvonne Barron  [address]  [place] [address]       Rheumatology Clinic F/U Virtual Visit Note     Last seen: 1/27/2021  DOS: 5/7/2021     Reason for visit: SLE    HPI:  Yvonne Barron is a 25-year-old female with SLE diagnosed in 11/2018 (+dsDNA, low C3/C4, low WBC). Her past medical history is significant for Graves disease s/p radioactive iodine ablation in 2013 and post-ablation hypothyroidism. She initially presented with raynaud's, rash over face and legs, nasal and oral ulcers, weight loss of 30 lbs, and severe fatigue. Was found to have Leukopenia, thrombocytopenia (130), sed rate 45, SHANDA 1:320, Sm>8, SSA>8, RNP>8, DS DNA 17, low C3 C4. Seen today for follow-up on disease management with HCQ, cellcept, and benlysta infusions.      Today 5/7/2021: Patient reports that benlysta infusions are going well without side effects. Thinks it is helping. Modest improvement in appetite and weight gains since last visit. Now at approximately 116lbs. Still forcing herself to eat more, but also some increase in appetite. Was previously eating 1 meal today. Has found it helpful to instead eat 3 smaller meals per day.     Reports some non-localized joint pain/aches and fatigue. No major changes and not worse.    Received COVID vaccine in Feb/March. No side effects. 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    Wondering about Fe levels (last checked 1/2020) and would like to check again.        REVIEW OF SYMPTOMS:  A comprehensive ROS was done. Positives are per HPI.        HISTORY REVIEW:  Past Medical History:   Diagnosis Date     Hypothyroidism      Lupus (systemic lupus erythematosus) (H)      No past surgical history on file.  No family history on file.  Social History     Socioeconomic History     Marital status: Single     Spouse name: Not on file     Number of children: Not on file     Years of education: Not on file     Highest  education level: Not on file   Occupational History     Not on file   Tobacco Use     Smoking status: Never Smoker     Smokeless tobacco: Never Used   Substance and Sexual Activity     Alcohol use: No     Drug use: No     Sexual activity: Never   Other Topics Concern     Not on file   Social History Narrative     Not on file     Social Determinants of Health     Financial Resource Strain: Not on file   Food Insecurity: Not on file   Transportation Needs: Not on file   Physical Activity: Not on file   Stress: Not on file   Social Connections: Not on file   Intimate Partner Violence: Not on file   Housing Stability: Not on file     Patient Active Problem List   Diagnosis     Systemic lupus erythematosus (H)     No Known Allergies    I reviewed the Current Medications:  Outpatient Medications Prior to Visit   Medication Sig Dispense Refill     hydrocortisone, Perianal, (HYDROCORTISONE) 2.5 % cream Place rectally every morning 30 g 3     hydroquinone (CRISTA) 4 % external cream Apply to hyperpigmented areas twice a day 30 g 3     hydroxychloroquine (PLAQUENIL) 200 MG tablet TAKE 1 TABLET BY MOUTH TWICE DAILY ON MONDAY, WEDNESDAY AND FRIDAY. TAKE 1 TABLET ONCE DAILY THE REST OF THE WEEK 120 tablet 0     levothyroxine (SYNTHROID/LEVOTHROID) 75 MCG tablet Take 75 mcg by mouth daily       mycophenolate (GENERIC EQUIVALENT) 500 MG tablet Take 1 tablet (500 mg) by mouth 2 times daily 180 tablet 0     tiZANidine (ZANAFLEX) 4 MG tablet Take 1 tablet (4 mg) by mouth 3 times daily as needed for muscle spasms 60 tablet 3     traMADol (ULTRAM) 50 MG tablet Take 1 tablet (50 mg) by mouth every 12 hours as needed for severe pain 60 tablet 3     tretinoin (RETIN-A) 0.025 % external cream Use every night.  Appt due 3/21. 45 g 7     vitamin D2 (ERGOCALCIFEROL) 10653 units (1250 mcg) capsule Take 1 capsule (50,000 Units) by mouth every 7 days 12 capsule 0     No facility-administered medications prior to visit.       Physical Exam:    GENERAL: alert and oriented, no acute distress  EYES: extraocular movements grossly intact, no conjunctivitis/scleritis, no discharge  ENT: normocephalic, no visible nasal discharge, no visible defect of external ears, no hearing deficits, no visible masses on neck  RESP: no audible wheezes or crackles, no cough  CV: unable to perform via video visit  ABDOMEN: unable to perform via video visit  SKIN: no visible edema, no rashes, no alopecia  PSYCH: positive affect, coherent speech  NEURO: No apparent CN defects, alert and oriented  MUSCULOSKELETAL: no synovitis, normal range of motion, able to form full fist    Recent lab results:    Labs Latest Ref Rng & Units 3/1/2021   WBC 4.0 - 11.0 10e9/L 1.6 (L)   RBC 3.8 - 5.2 10e12/L 4.04   HGB 11.7 - 15.7 g/dL 10.5 (L)   HCT 35.0 - 47.0 % 33.8 (L)   MCV 78 - 100 fl 84   MCH 26.5 - 33.0 pg 26.0 (L)   MCHC 31.5 - 36.5 g/dL 31.1 (L)   RDW 10.0 - 15.0 % 14.2    - 450 10e9/L 191   % NEUTROPHILS % 62.2   % LYMPHOCYTES % 26.3   % MONOCYTES % 10.3   % EOSINOPHILS % 0.0   % BASOPHILS % 0.6   ABSOLUTE NEUTROPHIL 1.6 - 8.3 10e9/L 1.0 (L)   ABSOLUTE LYMPHOCYTES 0.8 - 5.3 10e9/L 0.4 (L)   ABSOLUTE MONOCYTES 0.0 - 1.3 10e9/L 0.2   ABSOLUTE EOSINOPHILS 0.0 - 0.7 10e9/L 0.0   ABSOULTE BASOPHILS 0.0 - 0.2 10e9/L 0.0   AST 0 - 45 U/L 14   ALT 0 - 50 U/L 14   ALBUMIN 3.4 - 5.0 g/dL 3.5   CREATININE 0.52 - 1.04 mg/dL 0.53   GFR ESTIMATE >60 mL/min/1.73:m2 >90   GFR ESTIMATE, IF BLACK >60 mL/min/1.73:m2 >90   SED RATE 0 - 20 mm/h 54 (H)   CRP, INFLAMMATION 0.0 - 8.0 mg/L <2.9   PROTEIN TOTAL UR G/G CR 0 - 0.2 g/g Cr Unable to calculate due to low value   DNA-DS <10 IU/mL 91 (H)   COMPLEMENT C3 81 - 157 mg/dL 69 (L)   COMPLEMENT C4 13 - 39 mg/dL 6 (L)   CK TOTAL 30 - 225 U/L      Labs Latest Ref Rng & Units 1/4/2021   WBC 4.0 - 11.0 10e9/L 1.6 (L)   RBC 3.8 - 5.2 10e12/L 3.88   HGB 11.7 - 15.7 g/dL 10.9 (L)   HCT 35.0 - 47.0 % 35.3   MCV 78 - 100 fl 91   MCH 26.5 - 33.0 pg 28.1   MCHC  31.5 - 36.5 g/dL 30.9 (L)   RDW 10.0 - 15.0 % 13.0    - 450 10e9/L 165   % NEUTROPHILS % 66.5   % LYMPHOCYTES % 24.8   % MONOCYTES % 8.1   % EOSINOPHILS % 0.6   % BASOPHILS % 0.0   ABSOLUTE NEUTROPHIL 1.6 - 8.3 10e9/L 1.1 (L)   ABSOLUTE LYMPHOCYTES 0.8 - 5.3 10e9/L 0.4 (L)   ABSOLUTE MONOCYTES 0.0 - 1.3 10e9/L 0.1   ABSOLUTE EOSINOPHILS 0.0 - 0.7 10e9/L 0.0   ABSOULTE BASOPHILS 0.0 - 0.2 10e9/L 0.0   AST 0 - 45 U/L 14   ALT 0 - 50 U/L 12   ALBUMIN 3.4 - 5.0 g/dL 3.2 (L)   CREATININE 0.52 - 1.04 mg/dL 0.44 (L)   GFR ESTIMATE >60 mL/min/1.73:m2 >90   GFR ESTIMATE, IF BLACK >60 mL/min/1.73:m2 >90   SED RATE 0 - 20 mm/h 46 (H)   CRP, INFLAMMATION 0.0 - 8.0 mg/L <2.9   PROTEIN TOTAL UR G/G CR 0 - 0.2 g/g Cr    DNA-DS <10 IU/mL 145 (H)   COMPLEMENT C3 81 - 157 mg/dL 72 (L)   COMPLEMENT C4 13 - 39 mg/dL 3 (L)   CK TOTAL 30 - 225 U/L        ASSESSMENT and PLAN:    SLE with weight loss, active lupus serology (+anti-DNA, low C3/C4 3/2021), leukopenia (1.6 in all readings since 11/2020) but resolved chill julia lesions over toes. She is now up to 116 lbs from almost 100 lbs in 6/2020. We reduced cellcept from 3 to 2 gram a day in 6/2020 and we did further tapered to 1 gram a day at 11/2020 visit as it might be causing weight loss. We added benlysta at 11/2020 visit, she tolerates it well and feels it has helped with her disease.   Only current complaint is occasional arthralgia and fatigue. Recommended a trial of tramadol at last visit; risks were discussed.    Today 5/7/2021: Patient is stable with no major concerns. Benlysta infusions are going well. Weight gain is slow, but trending in the right direction. Will continue to hold off on further GI work-up based on continued progress. Hgb was slightly below nl in last few labs. Will include Fe with today s lab orders and continue to monitor. Recent lab values are otherwise stable or slightly improved. We agreed to continue with current treatment  plan.      Plan:    Continue benlysta monthly infusions (since 12/8/2020, peak benefit is 6 months)    Continue cellcept 500 mg bid (consider decreasing at next visit)    Labs in 6/2021 (including Fe and vitamin D) then [id number]    Tramadol 50 mg twice a day as needed for pain; risks were discussed    Continue plaquenil 2 tabs a day M/W/F, 1 tab a day the rest of the week (adjuted the dose given weight loss) with annual eye exam    GI follow up for weight loss (previously cancelled EGD/colonoscopy as gained weight by tapering cellcept - might do it in future if weight goes down again)    Follow up in 6 months      I saw and examined the patient with Dr. Irby. I acted as scribe for Dr. Irby.    Jeffrey Wahl MS    Attending Note: I saw and examined the patient with medical student Jeffrey . This note was written by him who acted as scribe for me. I agree with findings and recommendations written in this note. The note reflects decisions made by me.    No orders of the defined types were placed in this encounter.      [facility]ia [facility], pt is home    Joint pain neck legs knees under tramadol sleepy    118    gautam Irby MD

## 2021-11-17 NOTE — PROGRESS NOTES
Rheumatology Clinic F/U Virtual Visit Note     Last seen: 1/27/2021  DOS: 5/7/2021     Reason for visit: SLE    HPI:  Yvonne Barron is a 25-year-old female with SLE diagnosed in 11/2018 (+dsDNA, low C3/C4, low WBC). Her past medical history is significant for Graves disease s/p radioactive iodine ablation in 2013 and post-ablation hypothyroidism. She initially presented with raynaud's, rash over face and legs, nasal and oral ulcers, weight loss of 30 lbs, and severe fatigue. Was found to have Leukopenia, thrombocytopenia (130), sed rate 45, SHANDA 1:320, Sm>8, SSA>8, RNP>8, DS DNA 17, low C3 C4. Seen today for follow-up on disease management with HCQ, cellcept, and benlysta infusions.      Today 5/7/2021: Patient reports that benlysta infusions are going well without side effects. Thinks it is helping. Modest improvement in appetite and weight gains since last visit. Now at approximately 116lbs. Still forcing herself to eat more, but also some increase in appetite. Was previously eating 1 meal today. Has found it helpful to instead eat 3 smaller meals per day.     Reports some non-localized joint pain/aches and fatigue. No major changes and not worse.    Received COVID vaccine in Feb/March. No side effects. 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    Wondering about Fe levels (last checked 1/2020) and would like to check again.        REVIEW OF SYMPTOMS:  A comprehensive ROS was done. Positives are per HPI.        HISTORY REVIEW:  Past Medical History:   Diagnosis Date     Hypothyroidism      Lupus (systemic lupus erythematosus) (H)      No past surgical history on file.  No family history on file.  Social History     Socioeconomic History     Marital status: Single     Spouse name: Not on file     Number of children: Not on file     Years of education: Not on file     Highest education level: Not on file   Occupational History     Not on file   Tobacco Use     Smoking status: Never  Smoker     Smokeless tobacco: Never Used   Substance and Sexual Activity     Alcohol use: No     Drug use: No     Sexual activity: Never   Other Topics Concern     Not on file   Social History Narrative     Not on file     Social Determinants of Health     Financial Resource Strain: Not on file   Food Insecurity: Not on file   Transportation Needs: Not on file   Physical Activity: Not on file   Stress: Not on file   Social Connections: Not on file   Intimate Partner Violence: Not on file   Housing Stability: Not on file     Patient Active Problem List   Diagnosis     Systemic lupus erythematosus (H)     No Known Allergies    I reviewed the Current Medications:  Outpatient Medications Prior to Visit   Medication Sig Dispense Refill     hydrocortisone, Perianal, (HYDROCORTISONE) 2.5 % cream Place rectally every morning 30 g 3     hydroquinone (CRISTA) 4 % external cream Apply to hyperpigmented areas twice a day 30 g 3     hydroxychloroquine (PLAQUENIL) 200 MG tablet TAKE 1 TABLET BY MOUTH TWICE DAILY ON MONDAY, WEDNESDAY AND FRIDAY. TAKE 1 TABLET ONCE DAILY THE REST OF THE WEEK 120 tablet 0     levothyroxine (SYNTHROID/LEVOTHROID) 75 MCG tablet Take 75 mcg by mouth daily       mycophenolate (GENERIC EQUIVALENT) 500 MG tablet Take 1 tablet (500 mg) by mouth 2 times daily 180 tablet 0     tiZANidine (ZANAFLEX) 4 MG tablet Take 1 tablet (4 mg) by mouth 3 times daily as needed for muscle spasms 60 tablet 3     traMADol (ULTRAM) 50 MG tablet Take 1 tablet (50 mg) by mouth every 12 hours as needed for severe pain 60 tablet 3     tretinoin (RETIN-A) 0.025 % external cream Use every night.  Appt due 3/21. 45 g 7     vitamin D2 (ERGOCALCIFEROL) 27345 units (1250 mcg) capsule Take 1 capsule (50,000 Units) by mouth every 7 days 12 capsule 0     No facility-administered medications prior to visit.       Physical Exam:   GENERAL: alert and oriented, no acute distress  EYES: extraocular movements grossly intact, no  conjunctivitis/scleritis, no discharge  ENT: normocephalic, no visible nasal discharge, no visible defect of external ears, no hearing deficits, no visible masses on neck  RESP: no audible wheezes or crackles, no cough  CV: unable to perform via video visit  ABDOMEN: unable to perform via video visit  SKIN: no visible edema, no rashes, no alopecia  PSYCH: positive affect, coherent speech  NEURO: No apparent CN defects, alert and oriented  MUSCULOSKELETAL: no synovitis, normal range of motion, able to form full fist    Recent lab results:    Labs Latest Ref Rng & Units 3/1/2021   WBC 4.0 - 11.0 10e9/L 1.6 (L)   RBC 3.8 - 5.2 10e12/L 4.04   HGB 11.7 - 15.7 g/dL 10.5 (L)   HCT 35.0 - 47.0 % 33.8 (L)   MCV 78 - 100 fl 84   MCH 26.5 - 33.0 pg 26.0 (L)   MCHC 31.5 - 36.5 g/dL 31.1 (L)   RDW 10.0 - 15.0 % 14.2    - 450 10e9/L 191   % NEUTROPHILS % 62.2   % LYMPHOCYTES % 26.3   % MONOCYTES % 10.3   % EOSINOPHILS % 0.0   % BASOPHILS % 0.6   ABSOLUTE NEUTROPHIL 1.6 - 8.3 10e9/L 1.0 (L)   ABSOLUTE LYMPHOCYTES 0.8 - 5.3 10e9/L 0.4 (L)   ABSOLUTE MONOCYTES 0.0 - 1.3 10e9/L 0.2   ABSOLUTE EOSINOPHILS 0.0 - 0.7 10e9/L 0.0   ABSOULTE BASOPHILS 0.0 - 0.2 10e9/L 0.0   AST 0 - 45 U/L 14   ALT 0 - 50 U/L 14   ALBUMIN 3.4 - 5.0 g/dL 3.5   CREATININE 0.52 - 1.04 mg/dL 0.53   GFR ESTIMATE >60 mL/min/1.73:m2 >90   GFR ESTIMATE, IF BLACK >60 mL/min/1.73:m2 >90   SED RATE 0 - 20 mm/h 54 (H)   CRP, INFLAMMATION 0.0 - 8.0 mg/L <2.9   PROTEIN TOTAL UR G/G CR 0 - 0.2 g/g Cr Unable to calculate due to low value   DNA-DS <10 IU/mL 91 (H)   COMPLEMENT C3 81 - 157 mg/dL 69 (L)   COMPLEMENT C4 13 - 39 mg/dL 6 (L)   CK TOTAL 30 - 225 U/L      Labs Latest Ref Rng & Units 1/4/2021   WBC 4.0 - 11.0 10e9/L 1.6 (L)   RBC 3.8 - 5.2 10e12/L 3.88   HGB 11.7 - 15.7 g/dL 10.9 (L)   HCT 35.0 - 47.0 % 35.3   MCV 78 - 100 fl 91   MCH 26.5 - 33.0 pg 28.1   MCHC 31.5 - 36.5 g/dL 30.9 (L)   RDW 10.0 - 15.0 % 13.0    - 450 10e9/L 165   % NEUTROPHILS %  66.5   % LYMPHOCYTES % 24.8   % MONOCYTES % 8.1   % EOSINOPHILS % 0.6   % BASOPHILS % 0.0   ABSOLUTE NEUTROPHIL 1.6 - 8.3 10e9/L 1.1 (L)   ABSOLUTE LYMPHOCYTES 0.8 - 5.3 10e9/L 0.4 (L)   ABSOLUTE MONOCYTES 0.0 - 1.3 10e9/L 0.1   ABSOLUTE EOSINOPHILS 0.0 - 0.7 10e9/L 0.0   ABSOULTE BASOPHILS 0.0 - 0.2 10e9/L 0.0   AST 0 - 45 U/L 14   ALT 0 - 50 U/L 12   ALBUMIN 3.4 - 5.0 g/dL 3.2 (L)   CREATININE 0.52 - 1.04 mg/dL 0.44 (L)   GFR ESTIMATE >60 mL/min/1.73:m2 >90   GFR ESTIMATE, IF BLACK >60 mL/min/1.73:m2 >90   SED RATE 0 - 20 mm/h 46 (H)   CRP, INFLAMMATION 0.0 - 8.0 mg/L <2.9   PROTEIN TOTAL UR G/G CR 0 - 0.2 g/g Cr    DNA-DS <10 IU/mL 145 (H)   COMPLEMENT C3 81 - 157 mg/dL 72 (L)   COMPLEMENT C4 13 - 39 mg/dL 3 (L)   CK TOTAL 30 - 225 U/L        ASSESSMENT and PLAN:    SLE with weight loss, active lupus serology (+anti-DNA, low C3/C4 3/2021), leukopenia (1.6 in all readings since 11/2020) but resolved chill julia lesions over toes. She is now up to 116 lbs from almost 100 lbs in 6/2020. We reduced cellcept from 3 to 2 gram a day in 6/2020 and we did further tapered to 1 gram a day at 11/2020 visit as it might be causing weight loss. We added benlysta at 11/2020 visit, she tolerates it well and feels it has helped with her disease.   Only current complaint is occasional arthralgia and fatigue. Recommended a trial of tramadol at last visit; risks were discussed.    Today 5/7/2021: Patient is stable with no major concerns. Benlysta infusions are going well. Weight gain is slow, but trending in the right direction. Will continue to hold off on further GI work-up based on continued progress. Hgb was slightly below nl in last few labs. Will include Fe with today s lab orders and continue to monitor. Recent lab values are otherwise stable or slightly improved. We agreed to continue with current treatment plan.      Plan:    Continue benlysta monthly infusions (since 12/8/2020, peak benefit is 6 months)    Continue cellcept  500 mg bid (consider decreasing at next visit)    Labs in 6/2021 (including Fe and vitamin D) then c7prodml    Tramadol 50 mg twice a day as needed for pain; risks were discussed    Continue plaquenil 2 tabs a day M/W/F, 1 tab a day the rest of the week (adjuted the dose given weight loss) with annual eye exam    GI follow up for weight loss (previously cancelled EGD/colonoscopy as gained weight by tapering cellcept - might do it in future if weight goes down again)    Follow up in 6 months      I saw and examined the patient with Dr. Irby. I acted as scribe for Dr. Irby.    Jeffrey Wahl MS    Attending Note: I saw and examined the patient with medical student Jeffrey . This note was written by him who acted as scribe for me. I agree with findings and recommendations written in this note. The note reflects decisions made by me.    No orders of the defined types were placed in this encounter.    Video visit start: 1:57pm  Video visit end: 2:17pm    Via Raghu, pt is home    Joint pain neck legs knees under tramadol sleepy    118    gautam

## 2021-11-18 ENCOUNTER — OFFICE VISIT (OUTPATIENT)
Dept: DERMATOLOGY | Facility: CLINIC | Age: 26
End: 2021-11-18
Attending: DERMATOLOGY
Payer: COMMERCIAL

## 2021-11-18 VITALS
OXYGEN SATURATION: 97 % | TEMPERATURE: 98.5 F | WEIGHT: 118 LBS | DIASTOLIC BLOOD PRESSURE: 61 MMHG | SYSTOLIC BLOOD PRESSURE: 95 MMHG | HEART RATE: 85 BPM | BODY MASS INDEX: 19.05 KG/M2

## 2021-11-18 DIAGNOSIS — L65.9 LOSS OF HAIR: ICD-10-CM

## 2021-11-18 DIAGNOSIS — M32.9 SYSTEMIC LUPUS ERYTHEMATOSUS, UNSPECIFIED SLE TYPE, UNSPECIFIED ORGAN INVOLVEMENT STATUS (H): Primary | ICD-10-CM

## 2021-11-18 LAB
FERRITIN SERPL-MCNC: 21 NG/ML (ref 12–150)
IRON SATN MFR SERPL: 15 % (ref 15–46)
IRON SERPL-MCNC: 51 UG/DL (ref 35–180)
TIBC SERPL-MCNC: 342 UG/DL (ref 240–430)

## 2021-11-18 PROCEDURE — 250N000011 HC RX IP 250 OP 636: Performed by: DERMATOLOGY

## 2021-11-18 PROCEDURE — 11900 INJECT SKIN LESIONS </W 7: CPT | Performed by: DERMATOLOGY

## 2021-11-18 PROCEDURE — G0463 HOSPITAL OUTPT CLINIC VISIT: HCPCS

## 2021-11-18 RX ORDER — HEPARIN SODIUM,PORCINE 10 UNIT/ML
5 VIAL (ML) INTRAVENOUS
Status: CANCELLED | OUTPATIENT
Start: 2021-11-18

## 2021-11-18 RX ORDER — HEPARIN SODIUM (PORCINE) LOCK FLUSH IV SOLN 100 UNIT/ML 100 UNIT/ML
5 SOLUTION INTRAVENOUS
Status: CANCELLED | OUTPATIENT
Start: 2021-11-18

## 2021-11-18 RX ORDER — CLOBETASOL PROPIONATE 0.5 MG/G
CREAM TOPICAL
Qty: 30 G | Refills: 2 | Status: SHIPPED | OUTPATIENT
Start: 2021-11-18

## 2021-11-18 RX ADMIN — TRIAMCINOLONE ACETONIDE 10 MG: 10 INJECTION, SUSPENSION INTRA-ARTICULAR; INTRALESIONAL at 11:18

## 2021-11-18 ASSESSMENT — PAIN SCALES - GENERAL: PAINLEVEL: NO PAIN (0)

## 2021-11-18 NOTE — LETTER
11/18/2021       RE: Yvonne Barron  [address]  [place] [address]     Dear Colleague,    Thank you for referring your patient, Yvonne Barron, to the [facility] [place] at [facility]. Please see a copy of my visit note below.    [facility] Dermatology Note  Encounter Date: Nov 18, 2021  Office Visit     Dermatology Problem List:  1. Systemic lupus erythematosus ((+dsDNA, low C3/C4, low WBC), chillblain lupus erythematosus w/ Raynauds  - Current Rx: Benlysta, cellcept, plaquenil (followed by rheum)   2. 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 Hair loss is a discrete round patch at on the frontal-vertex scalp with minimal to no erthyema or scale. DDx includes alopecia areata in the setting of lupus and prior thyroid disease versus a scarring alopecia 2/2 SLE or CCCA overlap. Clinical morphology today most c/w a non-scarring alopecia like alopecia areata. Already has signs of regrowth in the center of the alopecic patch.   - ILK today as below   - Start clobetasol 0.05% cream every day     Procedures Performed:   - Intra-lesional triamcinolone procedure note. After positioning and cleansing with isopropyl alcohol, .7 total mL of triamcinolone 10 mg/mL was injected into 7 lesion(s) on the frontal scalp. The  patient tolerated the procedure well and left the dermatology clinic in good condition.    Follow-up: 6-8 week(s) in-person, or earlier for new or changing lesions    Staff and Resident:     Migdalia Johns MD/MPH  Internal Medicine/Dermatology  PGY-5    Staff Physician Comments:   I saw and evaluated the patient with the resident and I agree with the assessment and plan.  I was present for the entire minor procedure and examination. and examination.    Chico Quinonez MD, FAAD    Departments of Internal Medicine and Dermatology  [facility]  [phone]      ____________________________________________    CC: No chief complaint on file.    HPI:  Ms. Yvonne Barron is a(n) 26 year old female who presents today as a return patient for evaluation of new hair loss on the front of her scalp. She has a hx of SLE and is on Benlysta, MMF, and HCQ. Her lupus is actually much more well controlled now than prior. She was last seen in rheumatology clinic yesterday w/ Dr. Irby. She has gained weight which she is happy about since she's over 100 pounds now. Her hair loss is relatively recent and is not accompanied by any scalp symptoms or hair loss anywhere else. She noticed that all the hair was lost from a single patch on the front of her scalp. She thinks there are some new hairs already growing back. She remembers that when she was first diagnosed with lupus she had some similar patchy hair loss.      Patient is otherwise feeling well, without additional skin concerns.    Labs Reviewed:  None.     Physical Exam:  Vitals: There were no vitals taken for this visit.  SKIN: Focused examination of face and scalp was performed.  - One discrete oval patch of alopecia on the frontal vertex scalp. There is minimal to no erythema, no scale and the follicular ostia are still present. There are small re-growing hairs in the patch.   - No other lesions of concern on areas examined.      Medications:  Current Outpatient Medications   Medication     hydrocortisone, Perianal, (HYDROCORTISONE) 2.5 % cream     hydroquinone (CRISTA) 4 % external cream     hydroxychloroquine (PLAQUENIL) 200 MG tablet     levothyroxine (SYNTHROID/LEVOTHROID) 75 MCG tablet     mycophenolate (GENERIC EQUIVALENT) 500 MG tablet     sildenafil (REVATIO) 20 MG tablet     tiZANidine (ZANAFLEX) 4 MG tablet     traMADol (ULTRAM) 50 MG tablet     tretinoin (RETIN-A) 0.025 % external cream     vitamin D2 (ERGOCALCIFEROL) 33974 units (1250 mcg) capsule     No current facility-administered medications for this visit.      Past Medical History:   Patient Active Problem List   Diagnosis     Systemic lupus erythematosus (H)     Past Medical History:   Diagnosis Date     Hypothyroidism      Lupus (systemic lupus erythematosus) (H)        CC Referred Self, MD  No address on file on close of this encounter.

## 2021-11-18 NOTE — NURSING NOTE
Chief Complaint   Patient presents with     RECHECK     follow up lupus. left hand is numb today       BP 95/61   Pulse 85   Temp 98.5  F (36.9  C)   Wt 53.5 kg (118 lb)   SpO2 97%   BMI 19.05 kg/m        Hazel STOKES, BRYANNA

## 2021-11-18 NOTE — PROGRESS NOTES
[facility] Dermatology Note  Encounter Date: Nov 18, 2021  Office Visit     Dermatology Problem List:  1. Systemic lupus erythematosus ((+dsDNA, low C3/C4, low WBC), chillblain lupus erythematosus w/ Raynauds  - Current Rx: Benlysta, cellcept, plaquenil (followed by rheum)   2. 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 Hair loss is a discrete round patch at on the frontal-vertex scalp with minimal to no erthyema or scale. DDx includes alopecia areata in the setting of lupus and prior thyroid disease versus a scarring alopecia 2/2 SLE or CCCA overlap. Clinical morphology today most c/w a non-scarring alopecia like alopecia areata. Already has signs of regrowth in the center of the alopecic patch.   - ILK today as below   - Start clobetasol 0.05% cream every day     Procedures Performed:   - Intra-lesional triamcinolone procedure note. After positioning and cleansing with isopropyl alcohol, .7 total mL of triamcinolone 10 mg/mL was injected into 7 lesion(s) on the frontal scalp. The patient tolerated the procedure well and left the dermatology clinic in good condition.    Follow-up: 6-8 week(s) in-person, or earlier for new or changing lesions    Staff and Resident:     Migdalia Johns MD/MPH  Internal Medicine/Dermatology  PGY-5    Staff Physician Comments:   I saw and evaluated the patient with the resident and I  agree with the assessment and plan.  I was present for the entire minor procedure and examination. and examination.    Chico Quinonez MD, FAAD    Departments of Internal Medicine and Dermatology  [facility]  [phone]      ____________________________________________    CC: No chief complaint on file.    HPI:  Ms. Yvonne Barron is a(n) 26 year old female who presents today as a return patient for evaluation of new hair loss on the front of her scalp. She has a hx of SLE and is on Benlysta, MMF, and HCQ. Her lupus is actually much more well controlled now than prior. She was last seen in rheumatology clinic yesterday w/ Dr. Irby. She has gained weight which she is happy about since she's over 100 pounds now. Her hair loss is relatively recent and is not accompanied by any scalp symptoms or hair loss anywhere else. She noticed that all the hair was lost from a single patch on the front of her scalp. She thinks there are some new hairs already growing back. She remembers that when she was first diagnosed with lupus she had some similar patchy hair loss.      Patient is otherwise feeling well, without additional skin concerns.    Labs Reviewed:  None.     Physical Exam:  Vitals: There were no vitals taken for this visit.  SKIN: Focused examination of face and scalp was performed.  - One discrete oval patch of alopecia on the frontal vertex scalp. There is minimal to no erythema, no scale and the follicular ostia are still present. There are small re-growing hairs in the patch.   - No other lesions of concern on areas examined.     Medications:  Current Outpatient Medications   Medication     hydrocortisone, Perianal, (HYDROCORTISONE) 2.5 % cream     hydroquinone (CRISTA) 4 % external cream     hydroxychloroquine (PLAQUENIL) 200 MG tablet     levothyroxine (SYNTHROID/LEVOTHROID) 75 MCG tablet     mycophenolate (GENERIC EQUIVALENT) 500 MG tablet     sildenafil (REVATIO) 20 MG  tablet     tiZANidine (ZANAFLEX) 4 MG tablet     traMADol (ULTRAM) 50 MG tablet     tretinoin (RETIN-A) 0.025 % external cream     vitamin D2 (ERGOCALCIFEROL) 34675 units (1250 mcg) capsule     No current facility-administered medications for this visit.      Past Medical History:   Patient Active Problem List   Diagnosis     Systemic lupus erythematosus (H)     Past Medical History:   Diagnosis Date     Hypothyroidism      Lupus (systemic lupus erythematosus) (H)        CC Referred Self, MD  No address on file on close of this encounter.

## 2021-11-28 DIAGNOSIS — E55.9 VITAMIN D DEFICIENCY: ICD-10-CM

## 2021-12-01 NOTE — TELEPHONE ENCOUNTER
VITAMIN D2 50,000IU (ERGO) CAP RX      Last Written Prescription Date:  9-23-21  Last Fill Quantity: 12,   # refills: 0  Last Office Visit : 11-17-21  Future Office visit:  3-4-22    Routing refill request to provider for review/approval because:  Med not on protocol

## 2021-12-02 RX ORDER — ERGOCALCIFEROL 1.25 MG/1
CAPSULE, LIQUID FILLED ORAL
Qty: 12 CAPSULE | Refills: 0 | OUTPATIENT
Start: 2021-12-02

## 2021-12-02 RX ORDER — CHOLECALCIFEROL (VITAMIN D3) 50 MCG
1 TABLET ORAL DAILY
Qty: 90 TABLET | Refills: 3 | Status: SHIPPED | OUTPATIENT
Start: 2021-12-02

## 2021-12-02 NOTE — TELEPHONE ENCOUNTER
Ezio Irby MD  You 9 hours ago (9:34 PM)    PF    Plz refill as 2000 units every day, vit D level is normal now.    Rx sent to pharm  FYI to clinic-? Update pt, discontinue 50,000 units.

## 2021-12-09 DIAGNOSIS — M32.9 SYSTEMIC LUPUS ERYTHEMATOSUS, UNSPECIFIED SLE TYPE, UNSPECIFIED ORGAN INVOLVEMENT STATUS (H): ICD-10-CM

## 2021-12-11 NOTE — TELEPHONE ENCOUNTER
hydroxychloroquine (PLAQUENIL) 200 MG tablet  Last Written Prescription Date: 10/11/21  Last Fill Quantity: 120,   # refills: 0  Last Office Visit : 11/17/21  Future Office visit:  3/4/22    Creatinine   Date Value Ref Range Status   11/15/2021 0.55 0.52 - 1.04 mg/dL Final   06/17/2021 0.50 (L) 0.52 - 1.04 mg/dL Final     Eye exam: not found.  letter sent 10/6/21    Routing refill request to provider for review/approval because:  eye exam not found.

## 2021-12-13 RX ORDER — HYDROXYCHLOROQUINE SULFATE 200 MG/1
TABLET, FILM COATED ORAL
Qty: 120 TABLET | Refills: 0 | Status: SHIPPED | OUTPATIENT
Start: 2021-12-13

## 2021-12-30 DIAGNOSIS — M32.9 SYSTEMIC LUPUS ERYTHEMATOSUS, UNSPECIFIED SLE TYPE, UNSPECIFIED ORGAN INVOLVEMENT STATUS (H): ICD-10-CM

## 2022-01-03 RX ORDER — MYCOPHENOLATE MOFETIL 500 MG/1
500 TABLET ORAL 2 TIMES DAILY
Qty: 180 TABLET | Refills: 1 | Status: SHIPPED | OUTPATIENT
Start: 2022-01-03

## 2022-01-03 NOTE — TELEPHONE ENCOUNTER
mycophenolate (GENERIC EQUIVALENT) 500 MG tablet      Last Written Prescription Date:  6/29/2021  Last Fill Quantity: 180 tab,   # refills: 0  Last Office Visit: 11/17/2021  Future Office visit:  3/4/2022    CBC RESULTS: Recent Labs   Lab Test 11/15/21  1630   WBC 1.7*   RBC 4.61   HGB 13.2   HCT 41.9   MCV 91   MCH 28.6   MCHC 31.5   RDW 14.9   *       Creatinine   Date Value Ref Range Status   11/15/2021 0.55 0.52 - 1.04 mg/dL Final   06/17/2021 0.50 (L) 0.52 - 1.04 mg/dL Final   ]    Liver Function Studies -   Recent Labs   Lab Test 11/15/21  1630 08/27/20  0817 03/13/20  2343   PROTTOTAL  --   --  7.8   ALBUMIN 3.9   < > 3.3*   BILITOTAL  --   --  0.2   ALKPHOS  --   --  73   AST 9   < > 14   ALT 14   < > 10    < > = values in this interval not displayed.       Routing refill request to provider for review/approval because:  DMARD medication.  - labs current

## 2022-01-13 ENCOUNTER — OFFICE VISIT (OUTPATIENT)
Dept: DERMATOLOGY | Facility: CLINIC | Age: 27
End: 2022-01-13
Attending: DERMATOLOGY
Payer: COMMERCIAL

## 2022-01-13 VITALS
HEART RATE: 56 BPM | SYSTOLIC BLOOD PRESSURE: 103 MMHG | OXYGEN SATURATION: 98 % | DIASTOLIC BLOOD PRESSURE: 72 MMHG | WEIGHT: 127.8 LBS | BODY MASS INDEX: 20.63 KG/M2

## 2022-01-13 DIAGNOSIS — L65.9 ALOPECIA: Primary | ICD-10-CM

## 2022-01-13 DIAGNOSIS — R00.2 PALPITATIONS: ICD-10-CM

## 2022-01-13 DIAGNOSIS — M32.9 SYSTEMIC LUPUS ERYTHEMATOSUS, UNSPECIFIED SLE TYPE, UNSPECIFIED ORGAN INVOLVEMENT STATUS (H): ICD-10-CM

## 2022-01-13 DIAGNOSIS — L81.9 HYPERPIGMENTATION: ICD-10-CM

## 2022-01-13 PROCEDURE — G0463 HOSPITAL OUTPT CLINIC VISIT: HCPCS

## 2022-01-13 PROCEDURE — 11900 INJECT SKIN LESIONS </W 7: CPT | Performed by: DERMATOLOGY

## 2022-01-13 PROCEDURE — 99214 OFFICE O/P EST MOD 30 MIN: CPT | Mod: 25 | Performed by: DERMATOLOGY

## 2022-01-13 PROCEDURE — 250N000011 HC RX IP 250 OP 636: Performed by: DERMATOLOGY

## 2022-01-13 RX ADMIN — TRIAMCINOLONE ACETONIDE 10 MG: 10 INJECTION, SUSPENSION INTRA-ARTICULAR; INTRALESIONAL at 10:41

## 2022-01-13 NOTE — PATIENT INSTRUCTIONS
Skin of Color Friendly Sunscreens    Live Tinted Hueguard SPF 30 (Ulta)  Alastin mineral sunscreen  Cera Ve Hydrating Mineral Sunscreen (Tinted)  Elta MD UV Clear Tinted sunscreen  TiZo 3 Sunscreen (tinted brown)

## 2022-01-13 NOTE — LETTER
1/13/2022       RE: Yvonne Barron  [address]  [place] [address]     Dear Colleague,    Thank you for referring your patient, Yvonne Barron, to the [facility] [place] at [facility]. Please see a copy of my visit note below.    [facility] Rheumatology-Dermatology Note  Encounter Date: Jan 13, 2022  Office Visit     Dermatology Problem List:  # Systemic lupus erythematosus ((+dsDNA, low C3/C4, low WBC), chillblain lupus erythematosus w/ Raynauds  - Current Rx: Benlysta, cellcept, plaquenil (followed by rheum)   # Lupus related alopecia, likely alopecia areata   - Current Rx: ILK-10 11/18/21, ILK-5 1/13/22, clobetasol cream   # Graves disease s/p radioactive iodine ablation in 2013, post-ablation hypothyroidism   # Unintentional weight loss - Improving since on Benlysta and tapering cellcept per rheum  # Plaquenil hyperpigmentation vs melasma  - Current Rx: compounded 8% hydroquinone cream, tinted physical sunscreen    ____________________________________    Assessment & Plan:     # Lupus related alopecia  Likely related to SLE, with AA-like presentation. Overall her SLE is much more well controlled since starting Benlysta about a year ago; she is still working on tapering her cellcept due to weight loss and continues on plaquenil. Clinically presentation c/w non-scarring alopecia. Alopecic patch has responded beautifully to ILK injections, but small area of depression noted. Will continue treatment with lower strength ILK.  - ILK-5 today, see below  - Ok to continue clobetasol 0.05% cream every other day to alopecic patch  - As alopecia appears stable now, OK to taper Cellcept slowly per Rheumatology    # Post inflammatory hyperpigmentation  Likely secondary to SLE rash vs melasma vs plauquanol related. Improving slowly with hydroquinone. Currently on 6 week break. We discussed trialing a  stronger hydroquinone compounded cream or trialing a new medication called topical cysteamine (Cyspera) for hyperpigmentation during HQ breaks. Cyspera is very expensive ($200), will defer for now. Pt is using a chemical sunscreen, as many physical sunscreens leave an ashy caste.  - Start compounded hydroquinone 8%-tretinoin 0.025%-kojic acid 1%-niacinamide 4% cream nightly to dark spots, start in 2 weeks once 6 week break is done. Advised pt to stop Retin-A cream while using the product. [facility]  - Pt using chemical-based sunscreen, recommended tinted physical sunscreen for maximum protection to avoid photosensitivity and worsening of hyperpigmentation. Handout with recommendations provided    Procedures Performed:   - Intra-lesional triamcinolone procedure note. After positioning and cleansing with isopropyl alcohol, 1.0 total mL of triamcinolone 5 mg/mL was injected into 7 lesion(s) on the frontal scalp. The patient tolerated the procedure well and left the dermatology clinic in good condition.    Follow-up: 12 week(s) in-person, or earlier for new or changing lesions    Staff: Dr. Devi Massey MD  PGY-5 Medicine-Dermatology  Pager [phone]    Staff Physician Comments:   I saw and evaluated the patient with the resident and I agree with the assessment and plan.  I was present for the entire minor procedure and examination.    Chico Quinonez MD, FAAD    Departments of Internal Medicine and Dermatology  [facility]  [phone]      ____________________________________________    CC: RECHECK (6 week lupus follow and hair loss. patient has seen improvement)    HPI:  Ms. Yvonne Barron is a(n) 26 year old female who presents today as a return patient for alopecia areata, related to lupus erythematosus. At the last visit in Nov 2021, she had ILK injections and was given clobetasol cream. She reports improvement in hair regrowth. No  new areas of involvement. She is unsure how long she is to use clobetasol cream. Lupus is stable, no changes in medications. Wondering about whether cellcept can be decreased further    She has a hx of SLE and is on Benlysta, MMF, and HCQ. Sees Dr. Irby.    Patient is otherwise feeling well, without additional skin concerns.    Labs Reviewed:  None.     Physical Exam:  Vitals: There were no vitals taken for this visit.  SKIN: Focused examination of face and scalp was performed.  - One discrete oval patch of alopecia on the frontal vertex scalp, improved with terminal hair growth throughout.There is minimal to no erythema, no scale and the follicular ostia are still present. Mild depression noted centrally  - small discoid hyperpigmented plaque in the R conchal bowl  - hyperpigmented patches on the cheeks, lateral forehead  - No other lesions of concern on areas examined.             Medications:  Current Outpatient Medications   Medication     clobetasol (TEMOVATE) 0.05 % external cream     hydrocortisone, Perianal, (HYDROCORTISONE) 2.5 % cream     hydroquinone (CRISTA) 4 % external cream     hydroxychloroquine (PLAQUENIL) 200 MG tablet     levothyroxine (SYNTHROID/LEVOTHROID) 75 MCG tablet     mycophenolate (GENERIC EQUIVALENT) 500 MG tablet     sildenafil (REVATIO) 20 MG tablet     tiZANidine (ZANAFLEX) 4 MG tablet     traMADol (ULTRAM) 50 MG tablet     tretinoin (RETIN-A) 0.025 % external cream     vitamin D2 (ERGOCALCIFEROL) 79747 units (1250 mcg) capsule     vitamin D3 (CHOLECALCIFEROL) 50 mcg (2000 units) tablet     No current facility-administered medications for this visit.      Past Medical History:   Patient Active Problem List   Diagnosis     Systemic lupus erythematosus (H)     Past Medical History:   Diagnosis Date     Hypothyroidism      Lupus (systemic lupus erythematosus) (H)        CC Referred Self, MD  No address on file on close of this encounter.

## 2022-01-13 NOTE — PROGRESS NOTES
[facility] Rheumatology-Dermatology Note  Encounter Date: Jan 13, 2022  Office Visit     Dermatology Problem List:  # Systemic lupus erythematosus ((+dsDNA, low C3/C4, low WBC), chillblain lupus erythematosus w/ Raynauds  - Current Rx: Benlysta, cellcept, plaquenil (followed by rheum)   # Lupus related alopecia, likely alopecia areata   - Current Rx: ILK-10 11/18/21, ILK-5 1/13/22, clobetasol cream   # Graves disease s/p radioactive iodine ablation in 2013, post-ablation hypothyroidism   # Unintentional weight loss - Improving since on Benlysta and tapering cellcept per rheum  # Plaquenil hyperpigmentation vs melasma  - Current Rx: compounded 8% hydroquinone cream, tinted physical sunscreen    ____________________________________    Assessment & Plan:     # Lupus related alopecia  Likely related to SLE, with AA-like presentation. Overall her SLE is much more well controlled since starting Benlysta about a year ago; she is still working on tapering her cellcept due to weight loss and continues on plaquenil. Clinically presentation c/w non-scarring alopecia. Alopecic patch has responded beautifully to ILK injections, but small area of depression noted. Will continue treatment with lower strength ILK.  - ILK-5 today, see below  - Ok to continue clobetasol 0.05% cream every other day to alopecic patch  - As alopecia appears stable now, OK to taper Cellcept slowly per Rheumatology    # Post inflammatory hyperpigmentation  Likely secondary to SLE rash vs melasma vs plauquanol related. Improving slowly with hydroquinone. Currently on 6 week break. We discussed trialing a stronger hydroquinone compounded cream or trialing a new medication called topical cysteamine (Cyspera) for hyperpigmentation during HQ breaks. Cyspera is very expensive ($200), will defer for now. Pt is using a chemical sunscreen, as many physical sunscreens leave an ashy caste.  - Start compounded hydroquinone 8%-tretinoin  0.025%-kojic acid 1%-niacinamide 4% cream nightly to dark spots, start in 2 weeks once 6 week break is done. Advised pt to stop Retin-A cream while using the product. [facility]  - Pt using chemical-based sunscreen, recommended tinted physical sunscreen for maximum protection to avoid photosensitivity and worsening of hyperpigmentation. Handout with recommendations provided    Procedures Performed:   - Intra-lesional triamcinolone procedure note. After positioning and cleansing with isopropyl alcohol, 1.0 total mL of triamcinolone 5 mg/mL was injected into 7 lesion(s) on the frontal scalp. The patient tolerated the procedure well and left the dermatology clinic in good condition.    Follow-up: 12 week(s) in-person, or earlier for new or changing lesions    Staff: Dr. Devi Massey MD  PGY-5 Medicine-Dermatology  Pager [phone]    Staff Physician Comments:   I saw and evaluated the patient with the resident and I agree with the assessment and plan.  I was present for the entire minor procedure and examination.    Chico Quinonez MD, FAAD    Departments of Internal Medicine and Dermatology  [facility]  [phone]      ____________________________________________    CC: RECHECK (6 week lupus follow and hair loss. patient has seen improvement)    HPI:  Ms. Yvonne Barron is a(n) 26 year old female who presents today as a return patient for alopecia areata, related to lupus erythematosus. At the last visit in Nov 2021, she had ILK injections and was given clobetasol cream. She reports improvement in hair regrowth. No new areas of involvement. She is unsure how long she is to use clobetasol cream. Lupus is stable, no changes in medications. Wondering about whether cellcept can be decreased further    She has a hx of SLE and is on Benlysta, MMF, and HCQ. Sees Dr. Irby.    Patient is otherwise feeling well, without additional skin  concerns.    Labs Reviewed:  None.     Physical Exam:  Vitals: There were no vitals taken for this visit.  SKIN: Focused examination of face and scalp was performed.  - One discrete oval patch of alopecia on the frontal vertex scalp, improved with terminal hair growth throughout.There is minimal to no erythema, no scale and the follicular ostia are still present. Mild depression noted centrally  - small discoid hyperpigmented plaque in the R conchal bowl  - hyperpigmented patches on the cheeks, lateral forehead  - No other lesions of concern on areas examined.             Medications:  Current Outpatient Medications   Medication     clobetasol (TEMOVATE) 0.05 % external cream     hydrocortisone, Perianal, (HYDROCORTISONE) 2.5 % cream     hydroquinone (CRISTA) 4 % external cream     hydroxychloroquine (PLAQUENIL) 200 MG tablet     levothyroxine (SYNTHROID/LEVOTHROID) 75 MCG tablet     mycophenolate (GENERIC EQUIVALENT) 500 MG tablet     sildenafil (REVATIO) 20 MG tablet     tiZANidine (ZANAFLEX) 4 MG tablet     traMADol (ULTRAM) 50 MG tablet     tretinoin (RETIN-A) 0.025 % external cream     vitamin D2 (ERGOCALCIFEROL) 24911 units (1250 mcg) capsule     vitamin D3 (CHOLECALCIFEROL) 50 mcg (2000 units) tablet     No current facility-administered medications for this visit.      Past Medical History:   Patient Active Problem List   Diagnosis     Systemic lupus erythematosus (H)     Past Medical History:   Diagnosis Date     Hypothyroidism      Lupus (systemic lupus erythematosus) (H)        CC Referred Self, MD  No address on file on close of this encounter.

## 2022-01-13 NOTE — NURSING NOTE
Chief Complaint   Patient presents with     RECHECK     6 week lupus follow and hair loss. patient has seen improvement     /72   Pulse 56   Wt 58 kg (127 lb 12.8 oz)   SpO2 98%   BMI 20.63 kg/m        Hazel Adamson CMA

## 2022-01-24 ENCOUNTER — TELEPHONE (OUTPATIENT)
Dept: RHEUMATOLOGY | Facility: CLINIC | Age: 27
End: 2022-01-24
Payer: COMMERCIAL

## 2022-01-24 NOTE — TELEPHONE ENCOUNTER
Health Call Center    Phone Message    May a detailed message be left on voicemail: yes     Reason for Call: Medication Question or concern regarding medication   Prescription Clarification  Name of Medication: Infusion- Benlysta   Prescribing Provider: Dr Irby   Pharmacy: [facility] - [place], [place] - [address]   What on the order needs clarification? [place] [employer] is calling to talk to someone about the plan of treatment. They have faxed over the request a few times and have not heard back. Please call [place] back at [phone].     Action Taken: Message routed to:  [facility] ([facility]): [facility] Adult Rheumatology     Travel Screening: Not Applicable

## 2022-01-25 NOTE — TELEPHONE ENCOUNTER
Form has been received. Provider is in clinic 1/26/2022 and will be able to review and sign the form at that time.     Mirna Gregory CMA   1/25/2022 8:19 AM

## 2022-02-03 NOTE — TELEPHONE ENCOUNTER
[place] is calling back to follow up on the treatment plans that has been sent over to be signed.  She states they have not received anything.    She will be sending them over again today, to [phone].  [place] would like if form can be signed and sent back to [facility]  Fax: [phone]    If any other questions, feel free to reach out to [place] at [phone].

## 2022-02-04 NOTE — TELEPHONE ENCOUNTER
The form was faxed to us multiple times and documented as received on 1/25/2022. Provider was not in clinic to sign the form until 2/3/2022 at which time was signed and faxed back.

## 2022-02-24 DIAGNOSIS — M79.605 PAIN IN BOTH LOWER EXTREMITIES: ICD-10-CM

## 2022-02-24 DIAGNOSIS — M79.604 PAIN IN BOTH LOWER EXTREMITIES: ICD-10-CM

## 2022-02-24 NOTE — TELEPHONE ENCOUNTER
traMADol (ULTRAM) 50 MG tablet  Last Written Prescription Date:   7/29/2021  Last Fill Quantity: 60,   # refills: 3  Last Office Visit :  11/17/2021  Future Office visit:   3/4/2022    Routing refill request to provider for review/approval because:  Drug not on the [facility], [facility] or [facility] refill protocol or controlled substance      Safia Reyes RN  Central Triage Red Flags/Med Refills

## 2022-02-25 ENCOUNTER — TELEPHONE (OUTPATIENT)
Dept: RHEUMATOLOGY | Facility: CLINIC | Age: 27
End: 2022-02-25
Payer: COMMERCIAL

## 2022-02-25 RX ORDER — TRAMADOL HYDROCHLORIDE 50 MG/1
50 TABLET ORAL EVERY 12 HOURS PRN
Qty: 60 TABLET | Refills: 3 | Status: SHIPPED | OUTPATIENT
Start: 2022-02-25

## 2022-02-25 NOTE — TELEPHONE ENCOUNTER
[facility] Call Center    Phone Message    May a detailed message be left on voicemail: yes     Reason for Call: Call received from pt- she wanted Dr. Irby to be aware that she has had a family emergency and will need to stay where she is in [place] for at least the next month, but it could be up to several months (pt had labs and a f/u appt with Dr. Irby on 3/4 that were cancelled).     -She is trying to get in with another rheumatologist in that area, and was told by her previous rheumatologist there that she could be seen by them in March-IF she could have the results of her last lab tests faxed to them? Would like labs and the notes from her last office visit faxed to Dr. Catalan at the [facility] at [phone] (she's hoping that because she initially transferred her care from there to here and we received records from Dr. Catalan's office when she first started coming here, she's hoping that would mean these records could be sent without her needing to complete an GHASSAN).     -Pt also has questions about her monthly Belimumab (Benlysta) infusion: she has been doing these herself for the last several months, and she is in the process of trying to get things arranged so she can have her next one there in [place], but she is not sure if this is ultimately going to be possible or not. She's wondering what the risks (or potential side effects) would be for her if she is not able to do this there and has to skip a month or two?    -She also wanted Dr. Irby to be aware that she thinks she will have enough of all of medications to last her until she can be seen by this other rheumatologist in March-except for her hydroxychloroquine. She does not need that right now at this moment, but she does think she will need to have this refilled before she can be seen by her new rheumatologist there.     Action Taken: Message routed to:  [facility] ([facility]): [facility]    Travel  Screening: Not Applicable

## 2022-03-08 NOTE — TELEPHONE ENCOUNTER
Ezio Irby MD      skipping benlysta is ok, the only risk is lupus flare, I will refill meds as needed.    Message text      Last office visit note and lab results have been faxed. Above message given to patient via [employer].     Mirna Gregory CMA   3/8/2022 2:25 PM

## 2022-03-17 ENCOUNTER — TELEPHONE (OUTPATIENT)
Dept: RHEUMATOLOGY | Facility: CLINIC | Age: 27
End: 2022-03-17

## 2022-03-17 NOTE — TELEPHONE ENCOUNTER
Diana from [place] is calling to see if the form for Lachelle can be emailed to her at [email or web address]. She still has not received the faxed form that was sent to [phone] multiple times.

## 2022-04-13 ENCOUNTER — TELEPHONE (OUTPATIENT)
Dept: RHEUMATOLOGY | Facility: CLINIC | Age: 27
End: 2022-04-13
Payer: COMMERCIAL

## 2022-04-13 NOTE — TELEPHONE ENCOUNTER
3 plan of treatments were faxed to [place] and only 1 was signed. Need signed and re-faxed to [phone]

## 2022-04-14 NOTE — TELEPHONE ENCOUNTER
[place] from [place] is calling back to follow up on the treatment plans that was sent over yesterday to Fax: [phone].    Please contact [place] back to let her know if the other two (certification dated: 3/15- 5/13 and 1/16- 3/14) will be sent back to her and if so, when?      Call back number is [phone], no voicemail.

## 2022-04-15 NOTE — TELEPHONE ENCOUNTER
Orders were signed by Dr. Irby and faxed back to [place].    Called and updated [place].    Manisha Michael RN  Rheumatology Clinic

## 2022-05-21 ENCOUNTER — HEALTH MAINTENANCE LETTER (OUTPATIENT)
Age: 27
End: 2022-05-21

## 2022-09-16 DIAGNOSIS — M79.605 PAIN IN BOTH LOWER EXTREMITIES: ICD-10-CM

## 2022-09-16 DIAGNOSIS — M79.604 PAIN IN BOTH LOWER EXTREMITIES: ICD-10-CM

## 2022-09-18 ENCOUNTER — HEALTH MAINTENANCE LETTER (OUTPATIENT)
Age: 27
End: 2022-09-18

## 2022-09-20 NOTE — TELEPHONE ENCOUNTER
Medication/Dose: tiZANidine (ZANAFLEX) 4 MG tablet    Last Written : 8/31/21  Last Quantity: 60, # refills: 3  Last Office Visit :  11/17/21  Pending appointment: None    Pt is being seen by Dr Denny Catalan at [facility] [place] on 9/12/22.     Prescription denied as pt is under the care of another rheumatologist in [place].    Reinaldo Koch, BSN, RN

## 2023-06-04 ENCOUNTER — HEALTH MAINTENANCE LETTER (OUTPATIENT)
Age: 28
End: 2023-06-04

## 2024-07-14 ENCOUNTER — HEALTH MAINTENANCE LETTER (OUTPATIENT)
Age: 29
End: 2024-07-14